# Patient Record
Sex: FEMALE | Race: WHITE | NOT HISPANIC OR LATINO | Employment: FULL TIME | URBAN - METROPOLITAN AREA
[De-identification: names, ages, dates, MRNs, and addresses within clinical notes are randomized per-mention and may not be internally consistent; named-entity substitution may affect disease eponyms.]

---

## 2017-01-01 ENCOUNTER — OFFICE VISIT (OUTPATIENT)
Dept: INTERNAL MEDICINE | Facility: CLINIC | Age: 57
End: 2017-01-01
Payer: COMMERCIAL

## 2017-01-01 ENCOUNTER — OFFICE VISIT (OUTPATIENT)
Dept: ORTHOPEDICS | Facility: CLINIC | Age: 57
End: 2017-01-01
Payer: COMMERCIAL

## 2017-01-01 ENCOUNTER — LAB VISIT (OUTPATIENT)
Dept: LAB | Facility: HOSPITAL | Age: 57
End: 2017-01-01
Attending: INTERNAL MEDICINE
Payer: COMMERCIAL

## 2017-01-01 VITALS
WEIGHT: 207.25 LBS | OXYGEN SATURATION: 95 % | HEIGHT: 65 IN | DIASTOLIC BLOOD PRESSURE: 70 MMHG | BODY MASS INDEX: 34.53 KG/M2 | HEART RATE: 78 BPM | SYSTOLIC BLOOD PRESSURE: 138 MMHG

## 2017-01-01 VITALS — WEIGHT: 206 LBS | BODY MASS INDEX: 34.32 KG/M2 | HEIGHT: 65 IN

## 2017-01-01 VITALS — BODY MASS INDEX: 34.36 KG/M2 | HEIGHT: 65 IN | WEIGHT: 206.25 LBS

## 2017-01-01 DIAGNOSIS — E11.8 TYPE 2 DIABETES MELLITUS WITH COMPLICATION: ICD-10-CM

## 2017-01-01 DIAGNOSIS — E11.65 UNCONTROLLED TYPE 2 DIABETES MELLITUS WITH HYPERGLYCEMIA, WITH LONG-TERM CURRENT USE OF INSULIN: ICD-10-CM

## 2017-01-01 DIAGNOSIS — M17.12 PRIMARY OSTEOARTHRITIS OF LEFT KNEE: Primary | ICD-10-CM

## 2017-01-01 DIAGNOSIS — Z79.4 UNCONTROLLED TYPE 2 DIABETES MELLITUS WITH HYPERGLYCEMIA, WITH LONG-TERM CURRENT USE OF INSULIN: Primary | ICD-10-CM

## 2017-01-01 DIAGNOSIS — E11.65 UNCONTROLLED TYPE 2 DIABETES MELLITUS WITH HYPERGLYCEMIA, WITH LONG-TERM CURRENT USE OF INSULIN: Primary | ICD-10-CM

## 2017-01-01 DIAGNOSIS — Z79.4 UNCONTROLLED TYPE 2 DIABETES MELLITUS WITH HYPERGLYCEMIA, WITH LONG-TERM CURRENT USE OF INSULIN: ICD-10-CM

## 2017-01-01 DIAGNOSIS — F17.200 TOBACCO DEPENDENCE: ICD-10-CM

## 2017-01-01 DIAGNOSIS — G35 MULTIPLE SCLEROSIS: ICD-10-CM

## 2017-01-01 DIAGNOSIS — M17.12 OSTEOARTHRITIS OF LEFT KNEE, UNSPECIFIED OSTEOARTHRITIS TYPE: Primary | ICD-10-CM

## 2017-01-01 DIAGNOSIS — M25.362 UNSTABLE KNEE, LEFT: ICD-10-CM

## 2017-01-01 DIAGNOSIS — I10 ESSENTIAL HYPERTENSION: ICD-10-CM

## 2017-01-01 LAB
ESTIMATED AVG GLUCOSE: 146 MG/DL
HBA1C MFR BLD HPLC: 6.7 %

## 2017-01-01 PROCEDURE — 99999 PR PBB SHADOW E&M-EST. PATIENT-LVL III: CPT | Mod: PBBFAC,,, | Performed by: PHYSICIAN ASSISTANT

## 2017-01-01 PROCEDURE — 20610 DRAIN/INJ JOINT/BURSA W/O US: CPT | Mod: LT,S$GLB,, | Performed by: PHYSICIAN ASSISTANT

## 2017-01-01 PROCEDURE — 99499 UNLISTED E&M SERVICE: CPT | Mod: S$GLB,,, | Performed by: PHYSICIAN ASSISTANT

## 2017-01-01 PROCEDURE — 99999 PR PBB SHADOW E&M-EST. PATIENT-LVL II: CPT | Mod: PBBFAC,,, | Performed by: ORTHOPAEDIC SURGERY

## 2017-01-01 PROCEDURE — 99213 OFFICE O/P EST LOW 20 MIN: CPT | Mod: S$GLB,,, | Performed by: INTERNAL MEDICINE

## 2017-01-01 PROCEDURE — 99999 PR PBB SHADOW E&M-EST. PATIENT-LVL III: CPT | Mod: PBBFAC,,, | Performed by: INTERNAL MEDICINE

## 2017-01-01 PROCEDURE — 83036 HEMOGLOBIN GLYCOSYLATED A1C: CPT

## 2017-01-01 PROCEDURE — 36415 COLL VENOUS BLD VENIPUNCTURE: CPT | Mod: PO

## 2017-01-01 PROCEDURE — 99213 OFFICE O/P EST LOW 20 MIN: CPT | Mod: S$GLB,,, | Performed by: ORTHOPAEDIC SURGERY

## 2017-01-01 RX ORDER — INSULIN ASPART 100 [IU]/ML
INJECTION, SOLUTION INTRAVENOUS; SUBCUTANEOUS
Qty: 18 ML | Refills: 3 | Status: SHIPPED | OUTPATIENT
Start: 2017-01-01 | End: 2018-01-01 | Stop reason: SDUPTHER

## 2017-01-01 RX ORDER — METFORMIN HYDROCHLORIDE 1000 MG/1
TABLET ORAL
Qty: 180 TABLET | Refills: 1 | Status: SHIPPED | OUTPATIENT
Start: 2017-01-01 | End: 2018-01-01 | Stop reason: SDUPTHER

## 2017-01-01 RX ORDER — METOPROLOL TARTRATE 50 MG/1
TABLET ORAL
Qty: 180 TABLET | Refills: 1 | Status: SHIPPED | OUTPATIENT
Start: 2017-01-01 | End: 2018-01-01 | Stop reason: SDUPTHER

## 2017-01-01 RX ORDER — HYALURONATE SODIUM 20 MG/2 ML
20 SYRINGE (ML) INTRAARTICULAR WEEKLY
Status: SHIPPED | OUTPATIENT
Start: 2017-01-01

## 2017-01-01 RX ADMIN — Medication 20 MG: at 07:12

## 2017-01-01 RX ADMIN — Medication 20 MG: at 08:12

## 2017-01-30 DIAGNOSIS — K64.8 INTERNAL HEMORRHOID: ICD-10-CM

## 2017-01-30 DIAGNOSIS — F33.41 MDD (MAJOR DEPRESSIVE DISORDER), RECURRENT, IN PARTIAL REMISSION: ICD-10-CM

## 2017-01-31 RX ORDER — VENLAFAXINE HYDROCHLORIDE 150 MG/1
CAPSULE, EXTENDED RELEASE ORAL
Qty: 30 CAPSULE | Refills: 0 | Status: SHIPPED | OUTPATIENT
Start: 2017-01-31 | End: 2017-03-04 | Stop reason: SDUPTHER

## 2017-02-06 ENCOUNTER — LAB VISIT (OUTPATIENT)
Dept: LAB | Facility: HOSPITAL | Age: 57
End: 2017-02-06
Attending: INTERNAL MEDICINE
Payer: COMMERCIAL

## 2017-02-06 DIAGNOSIS — Z79.4 UNCONTROLLED TYPE 2 DIABETES MELLITUS WITH HYPERGLYCEMIA, WITH LONG-TERM CURRENT USE OF INSULIN: ICD-10-CM

## 2017-02-06 DIAGNOSIS — E11.65 UNCONTROLLED TYPE 2 DIABETES MELLITUS WITH HYPERGLYCEMIA, WITH LONG-TERM CURRENT USE OF INSULIN: ICD-10-CM

## 2017-02-06 LAB
ESTIMATED AVG GLUCOSE: 143 MG/DL
HBA1C MFR BLD HPLC: 6.6 %

## 2017-02-06 PROCEDURE — 36415 COLL VENOUS BLD VENIPUNCTURE: CPT | Mod: PO

## 2017-02-06 PROCEDURE — 83036 HEMOGLOBIN GLYCOSYLATED A1C: CPT

## 2017-03-02 ENCOUNTER — OFFICE VISIT (OUTPATIENT)
Dept: NEUROLOGY | Facility: CLINIC | Age: 57
End: 2017-03-02
Payer: COMMERCIAL

## 2017-03-02 ENCOUNTER — LAB VISIT (OUTPATIENT)
Dept: LAB | Facility: HOSPITAL | Age: 57
End: 2017-03-02
Attending: PSYCHIATRY & NEUROLOGY
Payer: COMMERCIAL

## 2017-03-02 VITALS
WEIGHT: 202.81 LBS | HEART RATE: 69 BPM | SYSTOLIC BLOOD PRESSURE: 143 MMHG | DIASTOLIC BLOOD PRESSURE: 69 MMHG | BODY MASS INDEX: 33.79 KG/M2 | HEIGHT: 65 IN

## 2017-03-02 DIAGNOSIS — M25.562 PAIN IN BOTH KNEES, UNSPECIFIED CHRONICITY: ICD-10-CM

## 2017-03-02 DIAGNOSIS — R26.9 GAIT DISTURBANCE: ICD-10-CM

## 2017-03-02 DIAGNOSIS — Z79.899 ENCOUNTER FOR LONG-TERM (CURRENT) USE OF HIGH-RISK MEDICATION: ICD-10-CM

## 2017-03-02 DIAGNOSIS — Z71.89 COUNSELING REGARDING GOALS OF CARE: ICD-10-CM

## 2017-03-02 DIAGNOSIS — E55.9 VITAMIN D INSUFFICIENCY: ICD-10-CM

## 2017-03-02 DIAGNOSIS — G35 MULTIPLE SCLEROSIS: ICD-10-CM

## 2017-03-02 DIAGNOSIS — G35 MULTIPLE SCLEROSIS: Primary | ICD-10-CM

## 2017-03-02 DIAGNOSIS — R53.83 FATIGUE, UNSPECIFIED TYPE: ICD-10-CM

## 2017-03-02 DIAGNOSIS — M25.561 PAIN IN BOTH KNEES, UNSPECIFIED CHRONICITY: ICD-10-CM

## 2017-03-02 LAB
25(OH)D3+25(OH)D2 SERPL-MCNC: 59 NG/ML
ALBUMIN SERPL BCP-MCNC: 3.5 G/DL
ALP SERPL-CCNC: 72 U/L
ALT SERPL W/O P-5'-P-CCNC: 22 U/L
AST SERPL-CCNC: 15 U/L
BASOPHILS # BLD AUTO: 0.03 K/UL
BASOPHILS NFR BLD: 0.3 %
BILIRUB DIRECT SERPL-MCNC: 0.1 MG/DL
BILIRUB SERPL-MCNC: 0.1 MG/DL
DIFFERENTIAL METHOD: NORMAL
EOSINOPHIL # BLD AUTO: 0.2 K/UL
EOSINOPHIL NFR BLD: 1.8 %
ERYTHROCYTE [DISTWIDTH] IN BLOOD BY AUTOMATED COUNT: 12.3 %
HCT VFR BLD AUTO: 40.9 %
HGB BLD-MCNC: 13.4 G/DL
LYMPHOCYTES # BLD AUTO: 3.6 K/UL
LYMPHOCYTES NFR BLD: 30.5 %
MCH RBC QN AUTO: 30.5 PG
MCHC RBC AUTO-ENTMCNC: 32.8 %
MCV RBC AUTO: 93 FL
MONOCYTES # BLD AUTO: 0.8 K/UL
MONOCYTES NFR BLD: 6.8 %
NEUTROPHILS # BLD AUTO: 7.1 K/UL
NEUTROPHILS NFR BLD: 60.3 %
PLATELET # BLD AUTO: 279 K/UL
PMV BLD AUTO: 11.1 FL
PROT SERPL-MCNC: 8 G/DL
RBC # BLD AUTO: 4.39 M/UL
VIT B12 SERPL-MCNC: 443 PG/ML
WBC # BLD AUTO: 11.72 K/UL

## 2017-03-02 PROCEDURE — 99215 OFFICE O/P EST HI 40 MIN: CPT | Mod: S$GLB,,, | Performed by: PHYSICIAN ASSISTANT

## 2017-03-02 PROCEDURE — 3078F DIAST BP <80 MM HG: CPT | Mod: S$GLB,,, | Performed by: PHYSICIAN ASSISTANT

## 2017-03-02 PROCEDURE — 80076 HEPATIC FUNCTION PANEL: CPT

## 2017-03-02 PROCEDURE — 82607 VITAMIN B-12: CPT

## 2017-03-02 PROCEDURE — 3077F SYST BP >= 140 MM HG: CPT | Mod: S$GLB,,, | Performed by: PHYSICIAN ASSISTANT

## 2017-03-02 PROCEDURE — 1160F RVW MEDS BY RX/DR IN RCRD: CPT | Mod: S$GLB,,, | Performed by: PHYSICIAN ASSISTANT

## 2017-03-02 PROCEDURE — 36415 COLL VENOUS BLD VENIPUNCTURE: CPT

## 2017-03-02 PROCEDURE — 85025 COMPLETE CBC W/AUTO DIFF WBC: CPT

## 2017-03-02 PROCEDURE — 99999 PR PBB SHADOW E&M-EST. PATIENT-LVL IV: CPT | Mod: PBBFAC,,, | Performed by: PHYSICIAN ASSISTANT

## 2017-03-02 PROCEDURE — 82306 VITAMIN D 25 HYDROXY: CPT

## 2017-03-02 NOTE — PROGRESS NOTES
"Subjective:       Patient ID: Serenity Powell is a 57 y.o. female who presents today for a routine clinic visit for MS.    MS HPI:  · DMT: Avonex  · Side effects from DMT? No  · Taking vitamin D3 as recommended? Yes -  Dose: 5,000 IU daily  · Patient states that she developed some tingling/numbness in R hand at the time of her mother's , and continues to come and go now.  She has felt this in the past when she had her "small stroke"  · She is having a great deal of pain in both knees she feels related to arthritis.  ·     SOCIAL HISTORY  Social History   Substance Use Topics    Smoking status: Current Every Day Smoker     Packs/day: 0.75     Years: 30.00    Smokeless tobacco: Never Used    Alcohol use No     Living arrangements - the patient lives with her grandson.  Employment [employer]    MS ROS:  · Fatigue: Yes - seems more lately. She wonders if it's her B-12 level again-she's stopped injections  · Sleep Disturbance: No  · Bladder Dysfunction: No--not taking Ditropan(removed from list today)  · Bowel Dysfunction: No  · Spasticity: Yes - primarily at night and sometimes when sitting  · Visual Symptoms: No  · Cognitive: No  · Mood Disorder: Yes - stable with Effexor  · Gait Disturbance: Yes - gait disturbance, continues to wear L AFO--feels this is stable  · Falls: No  · Hand Dysfunction: No  · Pain: Yes - as above, bilateral knee pain. She has seen ortho in the past, but states it has been quite some time. She is open to seeing them again  · Sexual Dysfunction: Not Assessed  · Skin Breakdown: No  · Tremors: No  · Dysphagia:  No  · Dysarthria:  No  · Heat sensitivity:  Yes   · Any un-met adaptive needs? No  · Copay Assist? No--paying 150$/month  · Clinical Trial candidate? Yes - antibody study        Objective:        1. 25 foot timed walk:6.8s, 6.9s with L AFO previous visits  Timed 25 Foot Walk: 2016 3/2/2017   Did patient wear an AFO? Yes Yes   Was assistive device used? No No   Time for 25 " Foot Walk (seconds) 6.5 6.9   Time for 25 Foot Walk (seconds) 6.7 6.9     Neurologic Exam     Mental Status   Oriented to person, place, and time.   Follows 3 step commands.   Speech: speech is normal   Level of consciousness: alert  Normal comprehension.     Cranial Nerves     CN II   Visual acuity: (20/40 OD; 20/30 OS corrected with Snellen hand held chart at 6 ft)    CN III, IV, VI   Pupils are equal, round, and reactive to light.  Extraocular motions are normal.     CN V   Facial sensation intact.     CN VII   Facial expression full, symmetric.     CN VIII   Hearing: intact (finger rub)    CN IX, X   Palate: symmetric    CN XI   CN XI normal.     CN XII   Tongue deviation: none    Motor Exam   Muscle bulk: normal  Right arm tone: normal  Left arm tone: normal  Right leg tone: increased  Left leg tone: increased    Strength   Right deltoid: 5/5  Left deltoid: 5/5  Right triceps: 5/5  Left triceps: 4/5  Right wrist extension: 5/5  Left wrist extension: 5/5  Right interossei: 5/5  Left interossei: 5/5  Right iliopsoas: 5/5  Left iliopsoas: 0/5  Right hamstrin/5  Left hamstrin/5  Right anterior tibial: 5/5  Left anterior tibial: 0/5  Right peroneal: 5/5  Left peroneal: 1/5    Sensory Exam   Right arm light touch: long standing numbness 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  Left arm light touch: normal  Right leg light touch: normal  Left leg light touch: normal  Right arm vibration: decreased from fingers  Left arm vibration: decreased from fingers  Right leg vibration: decreased from toes  Left leg vibration: decreased from toes    Gait, Coordination, and Reflexes     Gait  Gait: circumduction (L knee hyperextension; L foot drop)    Coordination   Finger to nose coordination: abnormal  Tandem walking coordination: abnormal    Tremor   Resting tremor: absent  Action tremor: absent    Reflexes   Right brachioradialis: 2+  Left brachioradialis: 2+  Right biceps: 2+  Left biceps: 2+  Right triceps: 2+  Left triceps:  2+  Right patellar: 2+  Left patellar: 2+  Right achilles: 2+  Left achilles: 2+  Right plantar: normal  Left plantar: upgoing  Right ankle clonus: absent  Left ankle clonus: absent  Right pendular knee jerk: absent  Left pendular knee jerk: absent       Normal RSM         Labs:   Ordered today: CBC, LFT, Vit D, Vit B-12    Lab Results   Component Value Date    [id number] 57 03/02/2016    [id number] 44 02/26/2015    [id number] 46 02/27/2012     No results found for: JCVINDEX, JCVANTIBODY  No results found for: PE8YPQEP, ABSOLUTECD3, CU0TUFBT, ABSOLUTECD8, DF4JQFDS, ABSOLUTECD4, LABCD48    Diagnosis/Assessment/Plan:    1. Multiple Sclerosis  · Assessment: Patient appears clinically stable on Avonex.   · Imaging: Will defer for now--will consider MRI in the next 6-12 months  · Disease Modifying Therapies: Continue Avonex and high dose Vit D3. Will check Avonex safety labs today along with Vit D and B-12    2. MS Symptom Assessment / Management  · Fatigue: Will recheck B-12  · Spasticity: encouraged stretching  · Visual Symptoms: recommended annual vision exam  · Gait Disturbance: consider PT--will refer to ortho secondary to worsening bilateral knee pain  · Pain: I have referred her to orthopedics today per her request  · Copay Assist: highly recommended applying for co-pay assistance as she is paying 150$/month for Avonex    Over 50% of this 40 minute visit was spent in direct face to face counseling of the patient regarding her current symptoms and management of same.  Follow up in 6 months with Dr. Richardson  Patient agreed to POC today.    Attending, Dr. Richardson, was available during today's encounter. Any change to plan along with cosign to appear in the EMR.     Gretchen Geller PA-C  [facility]    There are no diagnoses linked to this encounter.

## 2017-03-02 NOTE — MR AVS SNAPSHOT
[facility]  [address]  [facility] [address]  Phone: [phone]                  Serenity DESAI Sherry   3/2/2017 7:45 AM   Office Visit    Description:  Female : 1960   Provider:  Gretchen Geller PA-C   Department:  [facility]           Reason for Visit     Neurologic Problem           Diagnoses this Visit        Comments    Multiple sclerosis    -  Primary     Gait disturbance         Pain in both knees, unspecified chronicity                To Do List           Future Appointments        Provider Department Dept Phone    3/2/2017 9:00 AM LAB, SAME DAY [facility] [phone]    2017 9:00 AM Era Richardson MD [facility] [phone]      Goals (5 Years of Data)              Today    16    Blood Pressure <= 140/90   143/69  143/69  143/69    Related Problems    Uncontrolled type 2 diabetes mellitus    Notes - Note created  2016  8:59 AM by Chase Nelson MD    It is important to consistently maintain a controlled blood pressure.        Follow-Up and Disposition     Return in about 6 months (around 2017).      [facility]     [facility] -  Assistance  Registered nurses in the [facility] provide clinical advisement, health education, appointment booking, and other advisory services.  Call for this free service at [phone].             Medications           Message regarding Medications     Verify the changes and/or additions to your medication regime listed below are the same as discussed with your clinician today.  If any of these changes or additions are incorrect, please notify your healthcare provider.        STOP taking these medications     oxybutynin (DITROPAN-XL) 5 MG TR24 Take 1 tablet (5 mg total) by mouth once daily.           Verify that the below list of medications is an accurate representation of the medications you are  "currently taking.  If none reported, the list may be blank. If incorrect, please contact your healthcare provider. Carry this list with you in case of emergency.           Current Medications     atorvastatin (LIPITOR) 40 MG tablet Take 1 tablet (40 mg total) by mouth every evening.    AVONEX 30 mcg/0.5 mL injection INJECT 1 SYRINGE INTRAMUSCULARLY EVERY WEEK    BD INSULIN PEN NEEDLE UF SHORT 31 gauge x 5/16" Ndle     BD LUER-LINO SYRINGE 3 mL 25 gauge x 1" Syrg 1 each by Misc.(Non-Drug; Combo Route) route every 30 days. Use with b-12 shot    BLOOD SUGAR DIAGNOSTIC (CONTOUR NEXT STRIPS MISC) 1 each by Misc.(Non-Drug; Combo Route) route 4 (four) times daily.     CALCIUM CARBONATE/VITAMIN D3 (VITAMIN D-3 ORAL) Take 5,000 Units by mouth once daily.     cyanocobalamin 1,000 mcg/mL injection Inject 1000mcg (1ml) IM daily for 1 week, once a week for 1 month, then once a month thereafter    hydrochlorothiazide (HYDRODIURIL) 25 MG tablet TAKE 1 TABLET (25 MG TOTAL) BY MOUTH ONCE DAILY.    insulin aspart (NOVOLOG FLEXPEN) 100 unit/mL InPn pen 2 units before meals + sliding scale    insulin glargine (LANTUS SOLOSTAR) 100 unit/mL (3 mL) InPn pen Inject 50 Units into the skin every evening.    lisinopril (PRINIVIL,ZESTRIL) 40 MG tablet Take 1 tablet (40 mg total) by mouth once daily.    metformin (GLUCOPHAGE) 1000 MG tablet TAKE 1 TABLET (1,000 MG TOTAL) BY MOUTH 2 (TWO) TIMES DAILY.    metoprolol tartrate (LOPRESSOR) 50 MG tablet TAKE 1 TABLET (50 MG TOTAL) BY MOUTH 2 (TWO) TIMES DAILY.    pen needle, diabetic (BD INSULIN PEN NEEDLE UF MINI) 31 gauge x 3/16" Ndle 1 Box by Misc.(Non-Drug; Combo Route) route 3 (three) times daily.    syringe with needle, safety (INTEGRA SYRINGE) 1 mL 25 x 1" Syrg 25g 1in needle with 1cc syringes to be used for B12 injections.    venlafaxine (EFFEXOR-XR) 150 MG Cp24 TAKE 1 CAPSULE (150 MG TOTAL) BY MOUTH EVERY MORNING.    lancing device with lancets (ACCU-CHEK MULTICLIX LANCET) Kit 1 Box by " Misc.(Non-Drug; Combo Route) route once daily.           Clinical Reference Information           Your Vitals Were     BP                   143/69           Blood Pressure          Most Recent Value    BP  (!)  143/69      Allergies as of 3/2/2017     No Known Allergies      Immunizations Administered on Date of Encounter - 3/2/2017     None      Orders Placed During Today's Visit      Normal Orders This Visit    Ambulatory Referral to Orthopedics     Future Labs/Procedures Expected by Expires    CBC auto differential  3/2/2017 5/1/2018    Vitamin B12  3/2/2017 5/1/2018    Hepatic function panel  As directed 3/2/2018    Vitamin D  As directed 3/2/2018      Smoking Cessation     If you would like to quit smoking:   You may be eligible for free services if you are a [place] resident and started smoking cigarettes before September 1, 1988.  Call the Smoking Cessation [employer] (SCT) toll free at ([phone] 276-9368 or ([phone] 940-8422.   Call 5-798-QUIT-NOW if you do not meet the above criteria.            Language Assistance Services     ATTENTION: Language assistance services are available, free of charge. Please call [phone].      ATENCIÓN: Si habla español, tiene a gonzalez disposición servicios gratuitos de asistencia lingüística. Llame al [phone].     CHÚ Ý: N?u b?n nói Ti?ng Vi?t, có các d?ch v? h? tr? ngôn ng? mi?n phí dành cho b?n. G?i s? [phone].         [facility] complies with applicable Federal civil rights laws and does not discriminate on the basis of race, color, national origin, age, disability, or sex.

## 2017-03-04 DIAGNOSIS — K64.8 INTERNAL HEMORRHOID: ICD-10-CM

## 2017-03-04 DIAGNOSIS — F33.41 MDD (MAJOR DEPRESSIVE DISORDER), RECURRENT, IN PARTIAL REMISSION: ICD-10-CM

## 2017-03-06 RX ORDER — VENLAFAXINE HYDROCHLORIDE 150 MG/1
CAPSULE, EXTENDED RELEASE ORAL
Qty: 30 CAPSULE | Refills: 0 | Status: SHIPPED | OUTPATIENT
Start: 2017-03-06 | End: 2017-03-07 | Stop reason: SDUPTHER

## 2017-03-07 DIAGNOSIS — F33.41 MDD (MAJOR DEPRESSIVE DISORDER), RECURRENT, IN PARTIAL REMISSION: ICD-10-CM

## 2017-03-07 DIAGNOSIS — K64.8 INTERNAL HEMORRHOID: ICD-10-CM

## 2017-03-07 RX ORDER — VENLAFAXINE HYDROCHLORIDE 150 MG/1
150 CAPSULE, EXTENDED RELEASE ORAL DAILY
Qty: 90 CAPSULE | Refills: 0 | Status: SHIPPED | OUTPATIENT
Start: 2017-03-07 | End: 2017-06-22 | Stop reason: SDUPTHER

## 2017-03-07 NOTE — TELEPHONE ENCOUNTER
Spoke with pt to schedule an appointment. Pt will be seen in clinic on 3/30/2017 at 8:40am. Pt voiced understanding.

## 2017-03-07 NOTE — TELEPHONE ENCOUNTER
----- Message from Chase Nelson MD sent at 3/6/2017  6:02 PM CST -----  Patient overdue for 3 month follow-up in clinic.  Contact the patient to schedule appointment.

## 2017-03-30 ENCOUNTER — OFFICE VISIT (OUTPATIENT)
Dept: INTERNAL MEDICINE | Facility: CLINIC | Age: 57
End: 2017-03-30
Payer: COMMERCIAL

## 2017-03-30 ENCOUNTER — LAB VISIT (OUTPATIENT)
Dept: LAB | Facility: HOSPITAL | Age: 57
End: 2017-03-30
Attending: INTERNAL MEDICINE
Payer: COMMERCIAL

## 2017-03-30 VITALS
BODY MASS INDEX: 33.86 KG/M2 | SYSTOLIC BLOOD PRESSURE: 136 MMHG | HEART RATE: 74 BPM | OXYGEN SATURATION: 96 % | HEIGHT: 65 IN | DIASTOLIC BLOOD PRESSURE: 80 MMHG | WEIGHT: 203.25 LBS

## 2017-03-30 DIAGNOSIS — E78.5 HYPERLIPIDEMIA ASSOCIATED WITH TYPE 2 DIABETES MELLITUS: ICD-10-CM

## 2017-03-30 DIAGNOSIS — E11.69 HYPERLIPIDEMIA ASSOCIATED WITH TYPE 2 DIABETES MELLITUS: ICD-10-CM

## 2017-03-30 DIAGNOSIS — Z79.4 UNCONTROLLED TYPE 2 DIABETES MELLITUS WITH HYPERGLYCEMIA, WITH LONG-TERM CURRENT USE OF INSULIN: ICD-10-CM

## 2017-03-30 DIAGNOSIS — E11.65 UNCONTROLLED TYPE 2 DIABETES MELLITUS WITH HYPERGLYCEMIA, WITH LONG-TERM CURRENT USE OF INSULIN: ICD-10-CM

## 2017-03-30 DIAGNOSIS — E11.65 CONTROLLED TYPE 2 DIABETES MELLITUS WITH HYPERGLYCEMIA, WITH LONG-TERM CURRENT USE OF INSULIN: ICD-10-CM

## 2017-03-30 DIAGNOSIS — F17.200 TOBACCO DEPENDENCE: Primary | ICD-10-CM

## 2017-03-30 DIAGNOSIS — Z79.4 CONTROLLED TYPE 2 DIABETES MELLITUS WITH HYPERGLYCEMIA, WITH LONG-TERM CURRENT USE OF INSULIN: ICD-10-CM

## 2017-03-30 DIAGNOSIS — R26.9 GAIT DISTURBANCE: ICD-10-CM

## 2017-03-30 LAB
HDLC SERPL-MCNC: 262 MG/DL
HDLC SERPL-MCNC: 35 MG/DL

## 2017-03-30 PROCEDURE — 82465 ASSAY BLD/SERUM CHOLESTEROL: CPT

## 2017-03-30 PROCEDURE — 99999 PR PBB SHADOW E&M-EST. PATIENT-LVL V: CPT | Mod: PBBFAC,,, | Performed by: INTERNAL MEDICINE

## 2017-03-30 PROCEDURE — 3079F DIAST BP 80-89 MM HG: CPT | Mod: S$GLB,,, | Performed by: INTERNAL MEDICINE

## 2017-03-30 PROCEDURE — 36415 COLL VENOUS BLD VENIPUNCTURE: CPT | Mod: PO

## 2017-03-30 PROCEDURE — 3045F PR MOST RECENT HEMOGLOBIN A1C LEVEL 7.0-9.0%: CPT | Mod: S$GLB,,, | Performed by: INTERNAL MEDICINE

## 2017-03-30 PROCEDURE — 93005 ELECTROCARDIOGRAM TRACING: CPT | Mod: S$GLB,,, | Performed by: INTERNAL MEDICINE

## 2017-03-30 PROCEDURE — 3075F SYST BP GE 130 - 139MM HG: CPT | Mod: S$GLB,,, | Performed by: INTERNAL MEDICINE

## 2017-03-30 PROCEDURE — 1160F RVW MEDS BY RX/DR IN RCRD: CPT | Mod: S$GLB,,, | Performed by: INTERNAL MEDICINE

## 2017-03-30 PROCEDURE — 83718 ASSAY OF LIPOPROTEIN: CPT

## 2017-03-30 PROCEDURE — 99214 OFFICE O/P EST MOD 30 MIN: CPT | Mod: S$GLB,,, | Performed by: INTERNAL MEDICINE

## 2017-03-30 PROCEDURE — 4010F ACE/ARB THERAPY RXD/TAKEN: CPT | Mod: S$GLB,,, | Performed by: INTERNAL MEDICINE

## 2017-03-30 PROCEDURE — 83701 LIPOPROTEIN BLD HR FRACTION: CPT

## 2017-03-30 PROCEDURE — 83036 HEMOGLOBIN GLYCOSYLATED A1C: CPT

## 2017-03-30 PROCEDURE — 93010 ELECTROCARDIOGRAM REPORT: CPT | Mod: S$GLB,,, | Performed by: INTERNAL MEDICINE

## 2017-03-30 PROCEDURE — 2022F DILAT RTA XM EVC RTNOPTHY: CPT | Mod: S$GLB,,, | Performed by: INTERNAL MEDICINE

## 2017-03-30 RX ORDER — NAPROXEN SODIUM 220 MG/1
81 TABLET, FILM COATED ORAL DAILY
Start: 2017-03-30

## 2017-03-30 RX ORDER — ATORVASTATIN CALCIUM 20 MG/1
20 TABLET, FILM COATED ORAL NIGHTLY
Qty: 90 TABLET | Refills: 2 | Status: SHIPPED | OUTPATIENT
Start: 2017-03-30 | End: 2017-09-21 | Stop reason: SDUPTHER

## 2017-03-30 NOTE — PROGRESS NOTES
Portions of this note are generated with voice recognition software. Typographical errors may exist.     SUBJECTIVE:    This is a/an 57 y.o. female here for primary care visit for  Chief Complaint   Patient presents with    Fall     Patient states she had a minor ground-level fall 2 weeks ago.  States she didn't talked any other medical providers about this.  States she didn't sustain any significant injuries.  States that it was related to obstacle in a walkway at work.    Tobacco dependence.  Patient has never tried nicotine replacement therapy.  States that she continues to smoke excessively.    Hyperlipidemia.  Patient denies angina symptoms.  States that she suspended her atorvastatin because of nausea induced by the medication.      Medications Reviewed and Updated    Past medical, family, and social histories were reviewed and updated.    Review of Systems negative unless noted otherwise in history of present illness-  General ROS: negative  Psychological ROS: negative  Endocrine ROS: negative  Cardiovascular ROS: negative  Musculoskeletal ROS: negative    Allergic:  Review of patient's allergies indicates:  No Known Allergies    OBJECTIVE:  BP: 136/80 Pulse: 74    Wt Readings from Last 3 Encounters:   03/30/17 92.2 kg (203 lb 4.2 oz)   03/02/17 92 kg (202 lb 12.8 oz)   12/08/16 88 kg (194 lb 0.1 oz)    Body mass index is 33.82 kg/(m^2).  Previous Blood Pressure Readings :   BP Readings from Last 3 Encounters:   03/30/17 136/80   03/02/17 (!) 143/69   12/08/16 136/84       GEN: No apparent distress  HEENT: sclera non-icteric, conjunctiva clear  CV: no peripheral edema  PULM: breathing non-labored  ABD: Obese, protuberant abdomen.  PSYCH: appropriate affect  MSK: able to rise from chair without assistance  SKIN: normal skin turgor    Pertinent Labs Reviewed       ASSESSMENT/PLAN:    Tobacco dependence  -     Ambulatory referral to Smoking Cessation Program    Hyperlipidemia associated with type 2 diabetes  mellitus  -     atorvastatin (LIPITOR) 20 MG tablet; Take 1 tablet (20 mg total) by mouth every evening.  Dispense: 90 tablet; Refill: 2  -     aspirin 81 MG Chew; Take 1 tablet (81 mg total) by mouth once daily.  -     Cholesterol, total; Future; Expected date: 3/30/17  -     HDL CHOLESTEROL; Future; Expected date: 3/30/17  -     LDL CHOLESTEROL, DIRECT; Future; Expected date: 3/30/17    Controlled type 2 diabetes mellitus with hyperglycemia, with long-term current use of insulin  -     Ambulatory Referral to Optometry    Gait disturbance          Future Appointments  Date Time Provider Department Center   4/3/2017 10:00 AM Kerwin Pond OD [facility] OPTOMTY [place]   4/12/2017 9:00 AM Josias Duff MD [facility] ORTHO [address]   4/12/2017 10:00 AM [facility] NEUROLOGY-[facility] [address] [address]   5/3/2017 8:40 AM Chase Nelson MD [facility]   9/5/2017 9:00 AM Era Richardson MD [facility] MSC [facility] [facility]  3/30/2017  9:06 AM

## 2017-03-30 NOTE — MR AVS SNAPSHOT
[facility] Internal Medicine   [address]  [place] [address]  Phone: [phone]  Fax: [phone]                  Serenity Powell   3/30/2017 8:40 AM   Office Visit    Description:  Female : 1960   Provider:  Chase Nelson MD   Department:  [facility] Internal Medicine           Reason for Visit     Fall           Diagnoses this Visit        Comments    Tobacco dependence    -  Primary     Hyperlipidemia associated with type 2 diabetes mellitus         Controlled type 2 diabetes mellitus with hyperglycemia, with long-term current use of insulin         Gait disturbance                To Do List           Future Appointments        Provider Department Dept Phone    3/30/2017 9:30 AM LAB, [facility] [phone]    4/3/2017 10:00 AM Kerwin Pond OD [place] - Optometry [phone]    2017 9:00 AM Josias Duff MD [facility] - Orthopedics [phone]    2017 10:00 AM RESEARCH, NEUROLOGY-OP [facility] [phone]    5/3/2017 8:40 AM Chase Nelson MD [facility] [phone]      Goals (5 Years of Data)              Today    3/2/17    12/8/16    Blood Pressure <= 140/90   136/80  136/80  136/80    Related Problems    Controlled type 2 diabetes mellitus with hyperglycemia, with long-term current use of insulin    Notes - Note created  2016  8:59 AM by Chase Nelson MD    It is important to consistently maintain a controlled blood pressure.         These Medications        Disp Refills Start End    atorvastatin (LIPITOR) 20 MG tablet 90 tablet 2 3/30/2017 3/30/2018    Take 1 tablet (20 mg total) by mouth every evening. - Oral    Pharmacy: [facility]/pharmacy #[id number] - [place] [address] [facility] Ph #: [phone]       aspirin 81 MG Chew   3/30/2017     Take 1 tablet (81 mg total) by mouth once daily. - Oral    Pharmacy: [facility]/pharmacy #[id number] - [place] - [address] [facility] #: [phone]         [facility]     [facility] - 24/7 Assistance  Unless otherwise directed by your provider, please contact [facility], our nurse care line that is available for 24/7 assistance.     Registered nurses in the [facility] provide: appointment scheduling, clinical advisement, health education, and other advisory services.  Call: [phone] (toll free)               Medications           Message regarding Medications     Verify the changes and/or additions to your medication regime listed below are the same as discussed with your clinician today.  If any of these changes or additions are incorrect, please notify your healthcare provider.        START taking these NEW medications        Refills    aspirin 81 MG Chew     Sig: Take 1 tablet (81 mg total) by mouth once daily.    Class: No Print    Route: Oral      CHANGE how you are taking these medications     Start Taking Instead of    atorvastatin (LIPITOR) 20 MG tablet atorvastatin (LIPITOR) 40 MG tablet    Dosage:  Take 1 tablet (20 mg total) by mouth every evening. Dosage:  Take 1 tablet (40 mg total) by mouth every evening.    Reason for Change:  Reorder            Verify that the below list of medications is an accurate representation of the medications you are currently taking.  If none reported, the list may be blank. If incorrect, please contact your healthcare provider. Carry this list with you in case of emergency.           Current Medications     atorvastatin (LIPITOR) 20 MG tablet Take 1 tablet (20 mg total) by mouth every evening.    AVONEX 30 mcg/0.5 mL injection INJECT 1 SYRINGE INTRAMUSCULARLY EVERY WEEK    BD INSULIN PEN NEEDLE UF SHORT 31 gauge x 5/16" Ndle     BD LUER-LINO SYRINGE 3 mL 25 gauge x 1" Syrg 1 each by Misc.(Non-Drug; Combo Route) route every 30 days. Use with b-12 shot    BLOOD SUGAR DIAGNOSTIC (CONTOUR NEXT STRIPS MISC) 1 " "each by Misc.(Non-Drug; Combo Route) route 4 (four) times daily.     CALCIUM CARBONATE/VITAMIN D3 (VITAMIN D-3 ORAL) Take 5,000 Units by mouth once daily.     cyanocobalamin 1,000 mcg/mL injection Inject 1000mcg (1ml) IM daily for 1 week, once a week for 1 month, then once a month thereafter    hydrochlorothiazide (HYDRODIURIL) 25 MG tablet TAKE 1 TABLET (25 MG TOTAL) BY MOUTH ONCE DAILY.    insulin aspart (NOVOLOG FLEXPEN) 100 unit/mL InPn pen 2 units before meals + sliding scale    insulin glargine (LANTUS SOLOSTAR) 100 unit/mL (3 mL) InPn pen Inject 50 Units into the skin every evening.    lancing device with lancets (ACCU-CHEK MULTICLIX LANCET) Kit 1 Box by Misc.(Non-Drug; Combo Route) route once daily.    lisinopril (PRINIVIL,ZESTRIL) 40 MG tablet Take 1 tablet (40 mg total) by mouth once daily.    metformin (GLUCOPHAGE) 1000 MG tablet TAKE 1 TABLET (1,000 MG TOTAL) BY MOUTH 2 (TWO) TIMES DAILY.    metoprolol tartrate (LOPRESSOR) 50 MG tablet TAKE 1 TABLET (50 MG TOTAL) BY MOUTH 2 (TWO) TIMES DAILY.    pen needle, diabetic (BD INSULIN PEN NEEDLE UF MINI) 31 gauge x 3/16" Ndle 1 Box by Misc.(Non-Drug; Combo Route) route 3 (three) times daily.    syringe with needle, safety (INTEGRA SYRINGE) 1 mL 25 x 1" Syrg 25g 1in needle with 1cc syringes to be used for B12 injections.    venlafaxine (EFFEXOR-XR) 150 MG Cp24 Take 1 capsule (150 mg total) by mouth once daily.    aspirin 81 MG Chew Take 1 tablet (81 mg total) by mouth once daily.           Clinical Reference Information           Your Vitals Were     BP Pulse Height Weight SpO2 BMI    136/80 (BP Location: Right arm, Patient Position: Sitting, BP Method: Manual) 74 5' 5" (1.651 m) 92.2 kg (203 lb 4.2 oz) 96% 33.82 kg/m2      Blood Pressure          Most Recent Value    BP  136/80      Allergies as of 3/30/2017     No Known Allergies      Immunizations Administered on Date of Encounter - 3/30/2017     None      Orders Placed During Today's Visit      Normal Orders " This Visit    Ambulatory Referral to Optometry     Ambulatory referral to Smoking Cessation Program     Future Labs/Procedures Expected by Expires    Cholesterol, total  3/30/2017 5/29/2018    HDL CHOLESTEROL  3/30/2017 5/29/2018    LDL CHOLESTEROL, DIRECT  3/30/2017 5/29/2018      Instructions    Recommendations for today    We strongly recommend that you resume a be aspirin.  81 mg once daily.    We strongly recommend that you start atorvastatin again.  This cholesterol medication can significantly reduce the risk of heart attack and stroke.  When restarting the medication mild side effects might develop.  Simply continue taking the medication and mild side effects tend to go away within 14 days.  If they do not please contact the clinic.    If significant side effects develop please stop the medication and contact the clinic.  Remember what we discussed about muscle aches associated rarely with cholesterol medication atorvastatin.  If in doubt contact the clinic about muscle related symptoms.    Never drink alcohol in excess with statin cholesterol medication.          What Is Angina?  Angina is a warning that your heart muscle is not getting enough oxygen-rich blood and is at risk for damage. Medicines, certain medical procedures, and lifestyle changes can help control angina. Talk with your healthcare provider about how to prevent angina and what to do if you get it.    How does angina feel?  Angina is often described as chest pain, but this can be misleading. Angina is not always painful, and it isnt always felt in the chest. Angina might feel like:  · Discomfort, aching, tightness, or pressure that comes and goes. You may feel this in your chest, back, abdomen, arm, shoulder, neck, or jaw.  · Tiredness that gets worse or you have more tiredness than usual for no clear reason  · Shortness of breath while doing something that used to be easy  · Heartburn, indigestion, nausea, or sweating  Call 911 right away  if any of your symptoms lasts for more than a few minutes. Or if they go away and come back. Or if they happen at rest and don't go away after taking nitroglycerin. Or if they continue to get worse. You could be having a heart attack (acute myocardial infarction).  When does angina happen?  · Angina usually happens during activity. It can also occur when youre upset or after a large meal. Sometimes angina can happen when the weather is too hot or too cold. All of these things can put more stress on your body and your heart.  · You may have unstable angina if angina starts occurring more often, lasts longer, happens even when you are resting, or causes more discomfort. Its a sign that your heart problem may be getting worse. You need to call your healthcare provider right away.  Date Last Reviewed: 10/1/2016  © 3171-7929 "[employer]. [address] [place] [address]. All rights reserved. This information is not intended as a substitute for professional medical care. Always follow your healthcare professional's instructions.             Smoking Cessation     If you would like to quit smoking:   You may be eligible for free services if you are a [place] resident and started smoking cigarettes before September 1, 1988.  Call the Smoking Cessation Trust ([facility]) toll free at (145) 194-8622 or (168) 925-7483.   Call 1-800-QUIT-NOW if you do not meet the above criteria.   Contact us via email: [email or web address]   View our website for more information: [email or web address]        Language Assistance Services     ATTENTION: Language assistance services are available, free of charge. Please call [phone].      ATENCIÓN: Si habla español, tiene a gonzalez disposición servicios gratuitos de asistencia lingüística. Llame al [phone].     CHÚ Ý: N?u b?n nói Ti?ng Vi?t, có các d?ch v? h? tr? ngôn ng? mi?n phí dành cho b?n. G?i s? [phone].         [place] - Internal Medicine  complies with applicable Federal civil rights laws and does not discriminate on the basis of race, color, national origin, age, disability, or sex.

## 2017-03-30 NOTE — PATIENT INSTRUCTIONS
Recommendations for today    We strongly recommend that you resume a be aspirin.  81 mg once daily.    We strongly recommend that you start atorvastatin again.  This cholesterol medication can significantly reduce the risk of heart attack and stroke.  When restarting the medication mild side effects might develop.  Simply continue taking the medication and mild side effects tend to go away within 14 days.  If they do not please contact the clinic.    If significant side effects develop please stop the medication and contact the clinic.  Remember what we discussed about muscle aches associated rarely with cholesterol medication atorvastatin.  If in doubt contact the clinic about muscle related symptoms.    Never drink alcohol in excess with statin cholesterol medication.          What Is Angina?  Angina is a warning that your heart muscle is not getting enough oxygen-rich blood and is at risk for damage. Medicines, certain medical procedures, and lifestyle changes can help control angina. Talk with your healthcare provider about how to prevent angina and what to do if you get it.    How does angina feel?  Angina is often described as chest pain, but this can be misleading. Angina is not always painful, and it isnt always felt in the chest. Angina might feel like:  · Discomfort, aching, tightness, or pressure that comes and goes. You may feel this in your chest, back, abdomen, arm, shoulder, neck, or jaw.  · Tiredness that gets worse or you have more tiredness than usual for no clear reason  · Shortness of breath while doing something that used to be easy  · Heartburn, indigestion, nausea, or sweating  Call 911 right away if any of your symptoms lasts for more than a few minutes. Or if they go away and come back. Or if they happen at rest and don't go away after taking nitroglycerin. Or if they continue to get worse. You could be having a heart attack (acute myocardial infarction).  When does angina happen?  · Angina  usually happens during activity. It can also occur when youre upset or after a large meal. Sometimes angina can happen when the weather is too hot or too cold. All of these things can put more stress on your body and your heart.  · You may have unstable angina if angina starts occurring more often, lasts longer, happens even when you are resting, or causes more discomfort. Its a sign that your heart problem may be getting worse. You need to call your healthcare provider right away.  Date Last Reviewed: 10/1/2016  © 2623-5027 [employer]. [address] [place] [address]. All rights reserved. This information is not intended as a substitute for professional medical care. Always follow your healthcare professional's instructions.

## 2017-03-31 LAB
ESTIMATED AVG GLUCOSE: 157 MG/DL
HBA1C MFR BLD HPLC: 7.1 %

## 2017-04-03 LAB — LDLC SERPL-MCNC: 202 MG/DL

## 2017-04-25 RX ORDER — INTERFERON BETA-1A 30MCG/.5ML
KIT INTRAMUSCULAR
Qty: 1 KIT | Refills: 5 | Status: SHIPPED | OUTPATIENT
Start: 2017-04-25 | End: 2017-10-12 | Stop reason: SDUPTHER

## 2017-05-03 ENCOUNTER — LAB VISIT (OUTPATIENT)
Dept: LAB | Facility: HOSPITAL | Age: 57
End: 2017-05-03
Attending: INTERNAL MEDICINE
Payer: COMMERCIAL

## 2017-05-03 ENCOUNTER — OFFICE VISIT (OUTPATIENT)
Dept: INTERNAL MEDICINE | Facility: CLINIC | Age: 57
End: 2017-05-03
Payer: COMMERCIAL

## 2017-05-03 VITALS
OXYGEN SATURATION: 96 % | HEIGHT: 65 IN | BODY MASS INDEX: 33.98 KG/M2 | DIASTOLIC BLOOD PRESSURE: 68 MMHG | HEART RATE: 78 BPM | SYSTOLIC BLOOD PRESSURE: 136 MMHG | WEIGHT: 203.94 LBS

## 2017-05-03 DIAGNOSIS — Z79.4 UNCONTROLLED TYPE 2 DIABETES MELLITUS WITH HYPERGLYCEMIA, WITH LONG-TERM CURRENT USE OF INSULIN: Primary | ICD-10-CM

## 2017-05-03 DIAGNOSIS — Z79.4 UNCONTROLLED TYPE 2 DIABETES MELLITUS WITH HYPERGLYCEMIA, WITH LONG-TERM CURRENT USE OF INSULIN: ICD-10-CM

## 2017-05-03 DIAGNOSIS — E11.65 UNCONTROLLED TYPE 2 DIABETES MELLITUS WITH HYPERGLYCEMIA, WITH LONG-TERM CURRENT USE OF INSULIN: ICD-10-CM

## 2017-05-03 DIAGNOSIS — E78.5 HYPERLIPIDEMIA ASSOCIATED WITH TYPE 2 DIABETES MELLITUS: ICD-10-CM

## 2017-05-03 DIAGNOSIS — E11.65 UNCONTROLLED TYPE 2 DIABETES MELLITUS WITH HYPERGLYCEMIA, WITH LONG-TERM CURRENT USE OF INSULIN: Primary | ICD-10-CM

## 2017-05-03 DIAGNOSIS — F17.200 TOBACCO DEPENDENCE: ICD-10-CM

## 2017-05-03 DIAGNOSIS — F43.21 ADJUSTMENT DISORDER WITH DEPRESSED MOOD: ICD-10-CM

## 2017-05-03 DIAGNOSIS — E11.69 HYPERLIPIDEMIA ASSOCIATED WITH TYPE 2 DIABETES MELLITUS: ICD-10-CM

## 2017-05-03 LAB
CHOLEST/HDLC SERPL: 5.9 {RATIO}
HDL/CHOLESTEROL RATIO: 16.9 %
HDLC SERPL-MCNC: 178 MG/DL
HDLC SERPL-MCNC: 30 MG/DL
LDLC SERPL CALC-MCNC: 112.4 MG/DL
NONHDLC SERPL-MCNC: 148 MG/DL
TRIGL SERPL-MCNC: 178 MG/DL

## 2017-05-03 PROCEDURE — 99999 PR PBB SHADOW E&M-EST. PATIENT-LVL IV: CPT | Mod: PBBFAC,,, | Performed by: INTERNAL MEDICINE

## 2017-05-03 PROCEDURE — 3045F PR MOST RECENT HEMOGLOBIN A1C LEVEL 7.0-9.0%: CPT | Mod: S$GLB,,, | Performed by: INTERNAL MEDICINE

## 2017-05-03 PROCEDURE — 4010F ACE/ARB THERAPY RXD/TAKEN: CPT | Mod: S$GLB,,, | Performed by: INTERNAL MEDICINE

## 2017-05-03 PROCEDURE — 99214 OFFICE O/P EST MOD 30 MIN: CPT | Mod: S$GLB,,, | Performed by: INTERNAL MEDICINE

## 2017-05-03 PROCEDURE — 3075F SYST BP GE 130 - 139MM HG: CPT | Mod: S$GLB,,, | Performed by: INTERNAL MEDICINE

## 2017-05-03 PROCEDURE — 36415 COLL VENOUS BLD VENIPUNCTURE: CPT | Mod: PO

## 2017-05-03 PROCEDURE — 3078F DIAST BP <80 MM HG: CPT | Mod: S$GLB,,, | Performed by: INTERNAL MEDICINE

## 2017-05-03 PROCEDURE — 1160F RVW MEDS BY RX/DR IN RCRD: CPT | Mod: S$GLB,,, | Performed by: INTERNAL MEDICINE

## 2017-05-03 PROCEDURE — 80061 LIPID PANEL: CPT

## 2017-05-03 RX ORDER — INSULIN ASPART 100 [IU]/ML
INJECTION, SOLUTION INTRAVENOUS; SUBCUTANEOUS
Qty: 18 ML | Refills: 3 | Status: SHIPPED | OUTPATIENT
Start: 2017-05-03 | End: 2017-08-09 | Stop reason: SDUPTHER

## 2017-05-03 NOTE — MR AVS SNAPSHOT
[facility] Internal Medicine   [place]  [place] [address]  Phone: [phone]  Fax: [phone]                  Serenity Powell   5/3/2017 8:40 AM   Office Visit    Description:  Female : 1960   Provider:  Chase Nelson MD   Department:  [facility]           Reason for Visit     Follow-up           Diagnoses this Visit        Comments    Uncontrolled type 2 diabetes mellitus with hyperglycemia, with long-term current use of insulin                To Do List           Future Appointments        Provider Department Dept Phone    5/3/2017 9:15 AM [facility] [phone]    2017 8:20 AM Chase Nelson MD [facility] [phone]    2017 9:00 AM Era Richardson MD [facility] [address]- Multiple Sclerosis [phone]      Goals (5 Years of Data)              Today    3/30/17    3/2/17    Blood Pressure <= 140/90   136/68  136/68  136/68    Related Problems    Controlled type 2 diabetes mellitus with hyperglycemia, with long-term current use of insulin    Notes - Note created  2016  8:59 AM by Chase Nelson MD    It is important to consistently maintain a controlled blood pressure.         These Medications        Disp Refills Start End    insulin aspart (NOVOLOG FLEXPEN) 100 unit/mL InPn pen 18 mL 3 5/3/2017     4 units before meals + sliding scale    Pharmacy: [facility]/pharmacy #[id number] - [place] - [address] AT [facility] Ph #: [phone]       Notes to Pharmacy: Sliding scale plus mealtime insulin equal about 20 units daily      [facility] On Call     [facility] -  Assistance  Unless otherwise directed by your provider, please contact [facility], our nurse care line that is available for  assistance.     Registered nurses in the [facility] provide: appointment scheduling, clinical advisement, health education, and other  "advisory services.  Call: [phone] (toll free)               Medications           Message regarding Medications     Verify the changes and/or additions to your medication regime listed below are the same as discussed with your clinician today.  If any of these changes or additions are incorrect, please notify your healthcare provider.        CHANGE how you are taking these medications     Start Taking Instead of    insulin aspart (NOVOLOG FLEXPEN) 100 unit/mL InPn pen insulin aspart (NOVOLOG FLEXPEN) 100 unit/mL InPn pen    Dosage:  4 units before meals + sliding scale Dosage:  2 units before meals + sliding scale    Reason for Change:  Reorder            Verify that the below list of medications is an accurate representation of the medications you are currently taking.  If none reported, the list may be blank. If incorrect, please contact your healthcare provider. Carry this list with you in case of emergency.           Current Medications     aspirin 81 MG Chew Take 1 tablet (81 mg total) by mouth once daily.    atorvastatin (LIPITOR) 20 MG tablet Take 1 tablet (20 mg total) by mouth every evening.    AVONEX 30 mcg/0.5 mL injection INJECT 1 SYRINGE INTRAMUSCULARLY EVERY WEEK    BD INSULIN PEN NEEDLE UF SHORT 31 gauge x 5/16" Ndle     BD LUER-LINO SYRINGE 3 mL 25 gauge x 1" Syrg 1 each by Misc.(Non-Drug; Combo Route) route every 30 days. Use with b-12 shot    BLOOD SUGAR DIAGNOSTIC (CONTOUR NEXT STRIPS MISC) 1 each by Misc.(Non-Drug; Combo Route) route 4 (four) times daily.     CALCIUM CARBONATE/VITAMIN D3 (VITAMIN D-3 ORAL) Take 5,000 Units by mouth once daily.     cyanocobalamin 1,000 mcg/mL injection Inject 1000mcg (1ml) IM daily for 1 week, once a week for 1 month, then once a month thereafter    hydrochlorothiazide (HYDRODIURIL) 25 MG tablet TAKE 1 TABLET (25 MG TOTAL) BY MOUTH ONCE DAILY.    insulin aspart (NOVOLOG FLEXPEN) 100 unit/mL InPn pen 4 units before meals + sliding scale    insulin glargine " "(LANTUS SOLOSTAR) 100 unit/mL (3 mL) InPn pen Inject 50 Units into the skin every evening.    lancing device with lancets (ACCU-CHEK MULTICLIX LANCET) Kit 1 Box by Misc.(Non-Drug; Combo Route) route once daily.    lisinopril (PRINIVIL,ZESTRIL) 40 MG tablet Take 1 tablet (40 mg total) by mouth once daily.    metformin (GLUCOPHAGE) 1000 MG tablet TAKE 1 TABLET (1,000 MG TOTAL) BY MOUTH 2 (TWO) TIMES DAILY.    metoprolol tartrate (LOPRESSOR) 50 MG tablet TAKE 1 TABLET (50 MG TOTAL) BY MOUTH 2 (TWO) TIMES DAILY.    pen needle, diabetic (BD INSULIN PEN NEEDLE UF MINI) 31 gauge x 3/16" Ndle 1 Box by Misc.(Non-Drug; Combo Route) route 3 (three) times daily.    syringe with needle, safety (INTEGRA SYRINGE) 1 mL 25 x 1" Syrg 25g 1in needle with 1cc syringes to be used for B12 injections.    venlafaxine (EFFEXOR-XR) 150 MG Cp24 Take 1 capsule (150 mg total) by mouth once daily.           Clinical Reference Information           Your Vitals Were     BP Pulse Height Weight SpO2 BMI    136/68 (BP Location: Right arm, Patient Position: Sitting, BP Method: Manual) 78 5' 5" (1.651 m) 92.5 kg (203 lb 14.8 oz) 96% 33.93 kg/m2      Blood Pressure          Most Recent Value    BP  136/68      Allergies as of 5/3/2017     No Known Allergies      Immunizations Administered on Date of Encounter - 5/3/2017     None      Orders Placed During Today's Visit     Future Labs/Procedures Expected by Expires    Lipid panel  5/3/2017 8/1/2017      Instructions    Recommendations for today     Consider the following recommendations regarding transitioning to  forms of nicotine.      Continue checking blood sugar before every meal.    Target blood sugar is between 90 and 120.    Make changes to mealtime insulin as indicated on this paperwork.    Contact the clinic if sugar numbers are consistently below 90 or above the range indicated above      Understanding E-Cigarettes    E-cigarettes have become a popular form of smoking. People may use " these devices in place of regular cigarettes. Or they may use them to try to quit smoking altogether.    What are e-cigarettes?  E-cigarettes are devices that allow users to breath in liquid that has nicotine in it. They are also known as electronic nicotine delivery systems. They may look like regular cigarettes, cigars, or pipes. Some are even made to look like everyday items, such as flashlights or pens.  How do you use an e-cigarette?  An e-cigarette has 3 parts. It has a battery, a heating device, and a cartridge or tank. The part that heats up is called an atomizer. To use it, you put in a cartridge or fill the tank with a liquid. This liquid contains nicotine. It may also have other chemicals and flavorings. When the e-cigarette is puffed, the atomizer heats up. It turns the liquid in the tank or cartridge into an aerosol. You then breathe in this vapor. The act is called vaping. It mimics real cigarette smoking.    Who is using e-cigarettes?  More and more people are puffing on e-cigarettes. Current and former smokers of tobacco are heavy users. So, too, are middle and high school students. In fact, e-cigarettes are now students preferred form of smoking. Marketing plays a large part. Flavors--such as coffee, mint, and cherry--may tempt young people to try these products. The same marketing strategies used to addict people to tobacco cigarettes are now being used with e-cigarettes.    Can e-cigarettes help smokers quit?  Proponents say that e-cigarettes may help smokers kick the habit. But more research is needed to find out how well they work as a stop-smoking aid--and how safe they are. Users are still exposed to nicotine. And some smokers use both normal cigarettes and e-cigarettes. They may choose an e-cigarette in places where other smoking products are banned.  Experts worry that a smoker who uses e-cigarettes may not try other, proven ways to quit. A number of quit-smoking tools are available that  "have been approved by the FDA. If you are trying to quit smoking, see your healthcare provider for help.    Are they safer than traditional cigarettes?  Little research has been done on e-cigarettes. Because of this, experts do not know how much nicotine or other possibly harmful chemicals users are inhaling. They also do not know the short- and long-term risks of vaping.    But e-cigarettes may seem safer than other forms of smoking. Users dont breathe in burning tobacco and its many toxins. These include tar, ammonia, and arsenic. But they may still be exposed to other harmful substances. The vapor may have heavy metals and chemicals like formaldehyde in it. Flavorings may also hide potentially hazardous chemicals and other toxins. For that reason, if you are willing to start nicotine patches, gum, lozenges or inhalers from the pharmacy, this would be a safer option because these nicotine products are regulated by the FDA and are considered "pure" or "clean" nicotine products without the risk of these extra chemical.s     At high doses, nicotine can cause dizziness and vomiting. Users who refill their own cartridges are at a greater risk for unsafe levels of the drug. Nicotine poisoning is a major concern in children. Children younger than 5 have been harmed after accidentally coming into contact with the nicotine liquid.    Are there any laws against using e-cigarettes?  The FDA recently ruled to regulate e-cigarettes. They are considered a tobacco product. Makers of these devices have to follow certain rules on safety and advertising. They also cant sell or market e-cigarettes to minors.      Date Last Reviewed: 4/6/2016  © 5626-3890 [employer]. [address] [place] [address], [place] [address]. All rights reserved. This information is not intended as a substitute for professional medical care. Always follow your healthcare professional's instructions.             Smoking Cessation     If you would like " to quit smoking:   You may be eligible for free services if you are a [place] resident and started smoking cigarettes before September 1, 1988.  Call the Smoking Cessation Trust (SCT) toll free at (351) 622-8439 or (901) 926-6432.   Call 1-800-QUIT-NOW if you do not meet the above criteria.   Contact us via email: [email or web address]   View our website for more information: [email or web address]        Language Assistance Services     ATTENTION: Language assistance services are available, free of charge. Please call [phone].      ATENCIÓN: Si habla español, tiene a gonzalez disposición servicios gratuitos de asistencia lingüística. Llame al [phone].     CHÚ Ý: N?u b?n nói Ti?ng Vi?t, có các d?ch v? h? tr? ngôn ng? mi?n phí dành cho b?n. G?i s? [phone].         [facility] Internal Medicine complies with applicable Federal civil rights laws and does not discriminate on the basis of race, color, national origin, age, disability, or sex.

## 2017-05-03 NOTE — PATIENT INSTRUCTIONS
Recommendations for today     Consider the following recommendations regarding transitioning to  forms of nicotine.      Continue checking blood sugar before every meal.    Target blood sugar is between 90 and 120.    Make changes to mealtime insulin as indicated on this paperwork.    Contact the clinic if sugar numbers are consistently below 90 or above the range indicated above      Understanding E-Cigarettes    E-cigarettes have become a popular form of smoking. People may use these devices in place of regular cigarettes. Or they may use them to try to quit smoking altogether.    What are e-cigarettes?  E-cigarettes are devices that allow users to breath in liquid that has nicotine in it. They are also known as electronic nicotine delivery systems. They may look like regular cigarettes, cigars, or pipes. Some are even made to look like everyday items, such as flashlights or pens.  How do you use an e-cigarette?  An e-cigarette has 3 parts. It has a battery, a heating device, and a cartridge or tank. The part that heats up is called an atomizer. To use it, you put in a cartridge or fill the tank with a liquid. This liquid contains nicotine. It may also have other chemicals and flavorings. When the e-cigarette is puffed, the atomizer heats up. It turns the liquid in the tank or cartridge into an aerosol. You then breathe in this vapor. The act is called vaping. It mimics real cigarette smoking.    Who is using e-cigarettes?  More and more people are puffing on e-cigarettes. Current and former smokers of tobacco are heavy users. So, too, are middle and high school students. In fact, e-cigarettes are now students preferred form of smoking. Marketing plays a large part. Flavors--such as coffee, mint, and cherry--may tempt young people to try these products. The same marketing strategies used to addict people to tobacco cigarettes are now being used with e-cigarettes.    Can e-cigarettes help smokers  "quit?  Proponents say that e-cigarettes may help smokers kick the habit. But more research is needed to find out how well they work as a stop-smoking aid--and how safe they are. Users are still exposed to nicotine. And some smokers use both normal cigarettes and e-cigarettes. They may choose an e-cigarette in places where other smoking products are banned.  Experts worry that a smoker who uses e-cigarettes may not try other, proven ways to quit. A number of quit-smoking tools are available that have been approved by the FDA. If you are trying to quit smoking, see your healthcare provider for help.    Are they safer than traditional cigarettes?  Little research has been done on e-cigarettes. Because of this, experts do not know how much nicotine or other possibly harmful chemicals users are inhaling. They also do not know the short- and long-term risks of vaping.    But e-cigarettes may seem safer than other forms of smoking. Users dont breathe in burning tobacco and its many toxins. These include tar, ammonia, and arsenic. But they may still be exposed to other harmful substances. The vapor may have heavy metals and chemicals like formaldehyde in it. Flavorings may also hide potentially hazardous chemicals and other toxins. For that reason, if you are willing to start nicotine patches, gum, lozenges or inhalers from the pharmacy, this would be a safer option because these nicotine products are regulated by the FDA and are considered "pure" or "clean" nicotine products without the risk of these extra chemical.s     At high doses, nicotine can cause dizziness and vomiting. Users who refill their own cartridges are at a greater risk for unsafe levels of the drug. Nicotine poisoning is a major concern in children. Children younger than 5 have been harmed after accidentally coming into contact with the nicotine liquid.    Are there any laws against using e-cigarettes?  The FDA recently ruled to regulate e-cigarettes. " They are considered a tobacco product. Makers of these devices have to follow certain rules on safety and advertising. They also cant sell or market e-cigarettes to minors.      Date Last Reviewed: 4/6/2016  © 8524-6778 [employer]. [address] [place] [address], [place] [address]. All rights reserved. This information is not intended as a substitute for professional medical care. Always follow your healthcare professional's instructions.

## 2017-05-04 NOTE — PROGRESS NOTES
Portions of this note are generated with voice recognition software. Typographical errors may exist.     SUBJECTIVE:    This is a/an 57 y.o. female here for primary care visit for  Chief Complaint   Patient presents with    Diabetes     1 month                Medications Reviewed and Updated    Past medical, family, and social histories were reviewed and updated.    Review of Systems negative unless noted otherwise in history of present illness-  General ROS: negative  Psychological ROS: negative  Hematological and Lymphatic ROS: negative  Endocrine ROS: negative  Respiratory ROS: negative  Cardiovascular ROS: negative  Gastrointestinal ROS: negative  Musculoskeletal ROS: negative    Allergic:  Review of patient's allergies indicates:  No Known Allergies    OBJECTIVE:  BP: 136/68 Pulse: 78    Wt Readings from Last 3 Encounters:   05/03/17 92.5 kg (203 lb 14.8 oz)   03/30/17 92.2 kg (203 lb 4.2 oz)   03/02/17 92 kg (202 lb 12.8 oz)    Body mass index is 33.93 kg/(m^2).  Previous Blood Pressure Readings :   BP Readings from Last 3 Encounters:   05/03/17 136/68   03/30/17 136/80   03/02/17 (!) 143/69       GEN: No apparent distress  HEENT: sclera non-icteric, conjunctiva clear  CV: no peripheral edema  PULM: breathing non-labored  ABD: Obese, protuberant abdomen.  PSYCH: appropriate affect  MSK: able to rise from chair without assistance  SKIN: normal skin turgor    Pertinent Labs Reviewed       ASSESSMENT/PLAN:    Uncontrolled type 2 diabetes mellitus with hyperglycemia, with long-term current use of insulin.  Not optimally controlled.  Adjust insulin as follows  -     Lipid panel; Future; Expected date: 5/3/17  -     insulin aspart (NOVOLOG FLEXPEN) 100 unit/mL InPn pen; 4 units before meals + sliding scale  Dispense: 18 mL; Refill: 3    Tobacco dependence.  Not optimally controlled.  Detailed counseling on nicotine replacement strategies and eventually quitting.    Adjustment disorder with depressed mood..  Condition stable.  Counseling on self-care measures.  Continue current medical therapy.    Hyperlipidemia associated with type 2 diabetes mellitus.. Recommend patient continue on the lower dose of statin.  Patient did not seem to tolerate higher intensity.      Future Appointments  Date Time Provider Department Center   6/6/2017 8:20 AM Chase Nelson MD [facility]   9/5/2017 9:00 AM Era Richardson MD [facility]  5/4/2017  11:57 AM

## 2017-06-08 DIAGNOSIS — I10 ESSENTIAL HYPERTENSION: ICD-10-CM

## 2017-06-08 RX ORDER — LISINOPRIL 40 MG/1
40 TABLET ORAL DAILY
Qty: 90 TABLET | Refills: 3 | Status: SHIPPED | OUTPATIENT
Start: 2017-06-08 | End: 2018-01-01 | Stop reason: SDUPTHER

## 2017-06-22 DIAGNOSIS — F33.41 MDD (MAJOR DEPRESSIVE DISORDER), RECURRENT, IN PARTIAL REMISSION: ICD-10-CM

## 2017-06-22 DIAGNOSIS — K64.8 INTERNAL HEMORRHOID: ICD-10-CM

## 2017-06-22 RX ORDER — VENLAFAXINE HYDROCHLORIDE 150 MG/1
150 CAPSULE, EXTENDED RELEASE ORAL DAILY
Qty: 90 CAPSULE | Refills: 0 | Status: SHIPPED | OUTPATIENT
Start: 2017-06-22 | End: 2017-09-21 | Stop reason: SDUPTHER

## 2017-07-29 DIAGNOSIS — E11.65 UNCONTROLLED TYPE 2 DIABETES MELLITUS WITH HYPERGLYCEMIA, WITH LONG-TERM CURRENT USE OF INSULIN: ICD-10-CM

## 2017-07-29 DIAGNOSIS — Z79.4 UNCONTROLLED TYPE 2 DIABETES MELLITUS WITH HYPERGLYCEMIA, WITH LONG-TERM CURRENT USE OF INSULIN: ICD-10-CM

## 2017-07-31 DIAGNOSIS — Z79.4 UNCONTROLLED TYPE 2 DIABETES MELLITUS WITH HYPERGLYCEMIA, WITH LONG-TERM CURRENT USE OF INSULIN: ICD-10-CM

## 2017-07-31 DIAGNOSIS — I10 ESSENTIAL HYPERTENSION: ICD-10-CM

## 2017-07-31 DIAGNOSIS — E11.65 UNCONTROLLED TYPE 2 DIABETES MELLITUS WITH HYPERGLYCEMIA, WITH LONG-TERM CURRENT USE OF INSULIN: ICD-10-CM

## 2017-07-31 RX ORDER — INSULIN GLARGINE 100 [IU]/ML
50 INJECTION, SOLUTION SUBCUTANEOUS NIGHTLY
Qty: 54 SYRINGE | Refills: 1 | Status: SHIPPED | OUTPATIENT
Start: 2017-07-31 | End: 2017-08-01 | Stop reason: SDUPTHER

## 2017-08-01 ENCOUNTER — PATIENT MESSAGE (OUTPATIENT)
Dept: INTERNAL MEDICINE | Facility: CLINIC | Age: 57
End: 2017-08-01

## 2017-08-01 DIAGNOSIS — Z79.4 CONTROLLED TYPE 2 DIABETES MELLITUS WITH HYPERGLYCEMIA, WITH LONG-TERM CURRENT USE OF INSULIN: Primary | ICD-10-CM

## 2017-08-01 DIAGNOSIS — E11.65 CONTROLLED TYPE 2 DIABETES MELLITUS WITH HYPERGLYCEMIA, WITH LONG-TERM CURRENT USE OF INSULIN: Primary | ICD-10-CM

## 2017-08-01 RX ORDER — HYDROCHLOROTHIAZIDE 25 MG/1
25 TABLET ORAL DAILY
Qty: 90 TABLET | Refills: 0 | Status: SHIPPED | OUTPATIENT
Start: 2017-08-01 | End: 2017-08-04 | Stop reason: SDUPTHER

## 2017-08-01 RX ORDER — INSULIN GLARGINE 100 [IU]/ML
50 INJECTION, SOLUTION SUBCUTANEOUS NIGHTLY
Qty: 18 SYRINGE | Refills: 1 | Status: SHIPPED | OUTPATIENT
Start: 2017-08-01 | End: 2017-08-09 | Stop reason: SDUPTHER

## 2017-08-04 DIAGNOSIS — I10 ESSENTIAL HYPERTENSION: ICD-10-CM

## 2017-08-04 RX ORDER — HYDROCHLOROTHIAZIDE 25 MG/1
TABLET ORAL
Qty: 90 TABLET | Refills: 0 | Status: SHIPPED | OUTPATIENT
Start: 2017-08-04 | End: 2018-01-01 | Stop reason: SDUPTHER

## 2017-08-04 NOTE — TELEPHONE ENCOUNTER
Spoke with patient about getting an appointment for lab prior to Dr Nelson visit on 08/09/17.  Patient will be here on Saturday 08/05/07 to do lab.  Patient verbalized understanding.

## 2017-08-04 NOTE — TELEPHONE ENCOUNTER
----- Message from Chase Nelson MD sent at 8/4/2017  1:10 PM CDT -----  Please contact patient to schedule blood work at least 2 days before appointment

## 2017-08-05 ENCOUNTER — LAB VISIT (OUTPATIENT)
Dept: LAB | Facility: HOSPITAL | Age: 57
End: 2017-08-05
Attending: INTERNAL MEDICINE
Payer: COMMERCIAL

## 2017-08-05 DIAGNOSIS — E11.65 CONTROLLED TYPE 2 DIABETES MELLITUS WITH HYPERGLYCEMIA, WITH LONG-TERM CURRENT USE OF INSULIN: ICD-10-CM

## 2017-08-05 DIAGNOSIS — Z79.4 CONTROLLED TYPE 2 DIABETES MELLITUS WITH HYPERGLYCEMIA, WITH LONG-TERM CURRENT USE OF INSULIN: ICD-10-CM

## 2017-08-05 LAB
ALBUMIN SERPL BCP-MCNC: 3.4 G/DL
ALP SERPL-CCNC: 65 U/L
ALT SERPL W/O P-5'-P-CCNC: 25 U/L
ANION GAP SERPL CALC-SCNC: 11 MMOL/L
AST SERPL-CCNC: 17 U/L
BILIRUB SERPL-MCNC: 0.4 MG/DL
BUN SERPL-MCNC: 17 MG/DL
CALCIUM SERPL-MCNC: 9.8 MG/DL
CHLORIDE SERPL-SCNC: 103 MMOL/L
CO2 SERPL-SCNC: 26 MMOL/L
CREAT SERPL-MCNC: 0.9 MG/DL
EST. GFR  (AFRICAN AMERICAN): >60 ML/MIN/1.73 M^2
EST. GFR  (NON AFRICAN AMERICAN): >60 ML/MIN/1.73 M^2
ESTIMATED AVG GLUCOSE: 177 MG/DL
GLUCOSE SERPL-MCNC: 143 MG/DL
HBA1C MFR BLD HPLC: 7.8 %
POTASSIUM SERPL-SCNC: 4.1 MMOL/L
PROT SERPL-MCNC: 7.4 G/DL
SODIUM SERPL-SCNC: 140 MMOL/L

## 2017-08-05 PROCEDURE — 83036 HEMOGLOBIN GLYCOSYLATED A1C: CPT

## 2017-08-05 PROCEDURE — 80053 COMPREHEN METABOLIC PANEL: CPT

## 2017-08-05 PROCEDURE — 36415 COLL VENOUS BLD VENIPUNCTURE: CPT | Mod: PO

## 2017-08-09 ENCOUNTER — OFFICE VISIT (OUTPATIENT)
Dept: INTERNAL MEDICINE | Facility: CLINIC | Age: 57
End: 2017-08-09
Payer: COMMERCIAL

## 2017-08-09 ENCOUNTER — PATIENT MESSAGE (OUTPATIENT)
Dept: INTERNAL MEDICINE | Facility: CLINIC | Age: 57
End: 2017-08-09

## 2017-08-09 VITALS
HEART RATE: 80 BPM | HEIGHT: 65 IN | SYSTOLIC BLOOD PRESSURE: 130 MMHG | DIASTOLIC BLOOD PRESSURE: 88 MMHG | BODY MASS INDEX: 34.34 KG/M2 | WEIGHT: 206.13 LBS

## 2017-08-09 DIAGNOSIS — F17.200 TOBACCO DEPENDENCE: ICD-10-CM

## 2017-08-09 DIAGNOSIS — E11.65 UNCONTROLLED TYPE 2 DIABETES MELLITUS WITH HYPERGLYCEMIA, WITH LONG-TERM CURRENT USE OF INSULIN: Primary | ICD-10-CM

## 2017-08-09 DIAGNOSIS — Z79.4 UNCONTROLLED TYPE 2 DIABETES MELLITUS WITH HYPERGLYCEMIA, WITH LONG-TERM CURRENT USE OF INSULIN: Primary | ICD-10-CM

## 2017-08-09 PROCEDURE — 99999 PR PBB SHADOW E&M-EST. PATIENT-LVL III: CPT | Mod: PBBFAC,,, | Performed by: INTERNAL MEDICINE

## 2017-08-09 PROCEDURE — 4010F ACE/ARB THERAPY RXD/TAKEN: CPT | Mod: S$GLB,,, | Performed by: INTERNAL MEDICINE

## 2017-08-09 PROCEDURE — 3075F SYST BP GE 130 - 139MM HG: CPT | Mod: S$GLB,,, | Performed by: INTERNAL MEDICINE

## 2017-08-09 PROCEDURE — 3045F PR MOST RECENT HEMOGLOBIN A1C LEVEL 7.0-9.0%: CPT | Mod: S$GLB,,, | Performed by: INTERNAL MEDICINE

## 2017-08-09 PROCEDURE — 99214 OFFICE O/P EST MOD 30 MIN: CPT | Mod: S$GLB,,, | Performed by: INTERNAL MEDICINE

## 2017-08-09 PROCEDURE — 3008F BODY MASS INDEX DOCD: CPT | Mod: S$GLB,,, | Performed by: INTERNAL MEDICINE

## 2017-08-09 PROCEDURE — 3079F DIAST BP 80-89 MM HG: CPT | Mod: S$GLB,,, | Performed by: INTERNAL MEDICINE

## 2017-08-09 RX ORDER — INSULIN ASPART 100 [IU]/ML
INJECTION, SOLUTION INTRAVENOUS; SUBCUTANEOUS
Qty: 18 ML | Refills: 3 | Status: SHIPPED | OUTPATIENT
Start: 2017-08-09 | End: 2017-09-20 | Stop reason: SDUPTHER

## 2017-08-09 RX ORDER — INSULIN GLARGINE 100 [IU]/ML
62 INJECTION, SOLUTION SUBCUTANEOUS NIGHTLY
Qty: 18 SYRINGE | Refills: 1 | Status: SHIPPED | OUTPATIENT
Start: 2017-08-09 | End: 2017-09-20 | Stop reason: SDUPTHER

## 2017-08-09 NOTE — PROGRESS NOTES
Portions of this note are generated with voice recognition software. Typographical errors may exist.     SUBJECTIVE:    This is a/an 57 y.o. female here for primary care visit for  Chief Complaint   Patient presents with    Diabetes     f/u      Patient denies any specific psychosocial barrier for compliant with blood sugar checking.  Patient simply stating that she is burned out.  States that she will redouble her efforts checking blood sugar.    Denies any low blood sugar episodes.    Patient has been confused about the concept of correction factor adding units of insulin above and beyond fundamental dose.  States that she has been taking rigid pre-meal dose 4 units.    Continues to smoke cigarettes.  Has contemplated nicotine replacement therapy but has been noncommittal.  On further discussion today she is willing to give this another thought.      Medications Reviewed and Updated    Past medical, family, and social histories were reviewed and updated.    Review of Systems negative unless otherwise noted in history of present illness-   General ROS: negative  Psychological ROS: negative  Cardiovascular ROS: negative  Gastrointestinal ROS: negative  Genito-Urinary ROS: negative  Musculoskeletal ROS: negative  Neurological ROS: negative      Allergic:  Review of patient's allergies indicates:  No Known Allergies    OBJECTIVE:  BP: 130/88 Pulse: 80    Wt Readings from Last 3 Encounters:   08/09/17 93.5 kg (206 lb 2.1 oz)   05/03/17 92.5 kg (203 lb 14.8 oz)   03/30/17 92.2 kg (203 lb 4.2 oz)    Body mass index is 34.3 kg/m².  Previous Blood Pressure Readings :   BP Readings from Last 3 Encounters:   08/09/17 130/88   05/03/17 136/68   03/30/17 136/80     GEN: No apparent distress  HEENT: sclera non-icteric, conjunctiva clear  CV: no peripheral edema  PULM: breathing non-labored  ABD: Obese, protuberant abdomen.  PSYCH: appropriate affect  MSK: able to rise from chair without assistance  SKIN: normal skin  enzo    Pertinent Labs Reviewed       ASSESSMENT/PLAN:    Uncontrolled type 2 diabetes mellitus with hyperglycemia, with long-term current use of insulin.Condition not optimally controlled. Detailed counseling on self care measures. Plan to monitor clinically in addition to plan below.   -     insulin aspart (NOVOLOG FLEXPEN) 100 unit/mL InPn pen; 4 units before meals + sliding scale  Dispense: 18 mL; Refill: 3  -     insulin glargine (LANTUS SOLOSTAR) 100 unit/mL (3 mL) InPn pen; Inject 62 Units into the skin every evening. Contact MD for any change +/- 4 units  Dispense: 18 Syringe; Refill: 1    Tobacco dependence..Condition not optimally controlled. Detailed counseling on self care measures. Plan to monitor clinically in addition to plan below.   -     Ambulatory referral to Smoking Cessation Program          Future Appointments  Date Time Provider Department Center   9/5/2017 9:00 AM Era Richardson MD [facility] [place]   9/15/2017 8:00 AM Chase Nelson MD [facility]       Chase Nelson  8/9/2017  10:56 AM

## 2017-08-09 NOTE — PATIENT INSTRUCTIONS
Recommendations for today    Continue checking blood sugars 3 times daily just before meals.  We highly suggest that you set an alarm so remind herself to do this.    Contact the clinic for any blood sugar less than 85.    Please start using the correction scale below to give yourself extra units of mealtime insulin.      Sliding Scale     LOW DOSE  < 70 mg/dL    TREAT LOW SUGAR AND CALL CLINIC  [phone]  70 - 130 mg/dL  0 units    131 - 180 mg/dL  2 units   181 -240 mg/dL  4 units    241 - 300 mg/dL  6 units    301- 350 mg/dL   8 units   351 - 400 mg/dL  10 units  > 400 mg/dL   Stat lab glucose and call physician with results.

## 2017-08-22 ENCOUNTER — CLINICAL SUPPORT (OUTPATIENT)
Dept: SMOKING CESSATION | Facility: CLINIC | Age: 57
End: 2017-08-22
Payer: COMMERCIAL

## 2017-08-22 DIAGNOSIS — F17.200 NICOTINE DEPENDENCE: Primary | ICD-10-CM

## 2017-08-22 PROCEDURE — 99999 PR PBB SHADOW E&M-EST. PATIENT-LVL I: CPT | Mod: PBBFAC,,,

## 2017-08-22 PROCEDURE — 99404 PREV MED CNSL INDIV APPRX 60: CPT | Mod: S$GLB,,,

## 2017-08-22 RX ORDER — VARENICLINE TARTRATE 0.5 (11)-1
KIT ORAL
Qty: 1 PACKAGE | Refills: 0 | Status: SHIPPED | OUTPATIENT
Start: 2017-08-22 | End: 2017-09-20

## 2017-08-22 RX ORDER — IBUPROFEN 200 MG
1 TABLET ORAL DAILY
Qty: 28 PATCH | Refills: 0 | Status: SHIPPED | OUTPATIENT
Start: 2017-08-22 | End: 2017-09-21

## 2017-08-22 NOTE — Clinical Note
Pt seen at intake today. She currently smokes 20 cigs/day. Discussed tobacco cessation medication of chantix starter pack and 21 mg nicotine patch QD. Pt started on rate reduction and wait time of 15 min prior to smoking. Will see pt back in group in 1 wk.

## 2017-08-24 DIAGNOSIS — E11.8 TYPE 2 DIABETES MELLITUS WITH COMPLICATION: ICD-10-CM

## 2017-08-24 RX ORDER — METFORMIN HYDROCHLORIDE 1000 MG/1
TABLET ORAL
Qty: 180 TABLET | Refills: 0 | Status: SHIPPED | OUTPATIENT
Start: 2017-08-24 | End: 2017-01-01 | Stop reason: SDUPTHER

## 2017-09-05 ENCOUNTER — OFFICE VISIT (OUTPATIENT)
Dept: NEUROLOGY | Facility: CLINIC | Age: 57
End: 2017-09-05
Payer: COMMERCIAL

## 2017-09-05 VITALS
WEIGHT: 209 LBS | HEIGHT: 65 IN | HEART RATE: 66 BPM | BODY MASS INDEX: 34.82 KG/M2 | DIASTOLIC BLOOD PRESSURE: 65 MMHG | SYSTOLIC BLOOD PRESSURE: 124 MMHG

## 2017-09-05 DIAGNOSIS — M21.862 HYPEREXTENSION DEFORMITY OF KNEE, LEFT: ICD-10-CM

## 2017-09-05 DIAGNOSIS — G89.29 CHRONIC PAIN OF LEFT KNEE: ICD-10-CM

## 2017-09-05 DIAGNOSIS — Z71.89 COUNSELING REGARDING GOALS OF CARE: ICD-10-CM

## 2017-09-05 DIAGNOSIS — M25.562 CHRONIC PAIN OF LEFT KNEE: ICD-10-CM

## 2017-09-05 DIAGNOSIS — R26.9 GAIT DISTURBANCE: ICD-10-CM

## 2017-09-05 DIAGNOSIS — G35 MS (MULTIPLE SCLEROSIS): Primary | ICD-10-CM

## 2017-09-05 PROCEDURE — 3008F BODY MASS INDEX DOCD: CPT | Mod: S$GLB,,, | Performed by: PSYCHIATRY & NEUROLOGY

## 2017-09-05 PROCEDURE — 3078F DIAST BP <80 MM HG: CPT | Mod: S$GLB,,, | Performed by: PSYCHIATRY & NEUROLOGY

## 2017-09-05 PROCEDURE — 3074F SYST BP LT 130 MM HG: CPT | Mod: S$GLB,,, | Performed by: PSYCHIATRY & NEUROLOGY

## 2017-09-05 PROCEDURE — 99999 PR PBB SHADOW E&M-EST. PATIENT-LVL III: CPT | Mod: PBBFAC,,, | Performed by: PSYCHIATRY & NEUROLOGY

## 2017-09-05 PROCEDURE — 99215 OFFICE O/P EST HI 40 MIN: CPT | Mod: S$GLB,,, | Performed by: PSYCHIATRY & NEUROLOGY

## 2017-09-05 NOTE — PROGRESS NOTES
"Subjective:       Patient ID: Serenity Powell is a 57 y.o. female who presents today for a routine clinic visit for MS.    MS HPI:  · DMT: Avonex  · Side effects from DMT? No  · Taking vitamin D3 as recommended? Yes -  Dose: 5,000 IU daily  · Overall she feels stable  · Wears left AFO  · Continues to have knee pain left > right;  She cancelled the appt we made for her with ortho;   · She has not stopped smoking yet;  Does have prescription for Chantix;     SOCIAL HISTORY  Social History   Substance Use Topics    Smoking status: Current Every Day Smoker     Packs/day: 0.75     Years: 30.00    Smokeless tobacco: Never Used    Alcohol use No     Living arrangements - the patient's grandson lives with her.  Employment  [employer]    MS ROS:  · Fatigue: Yes - "Occasionally", she says;   · Sleep Disturbance: No  · Bladder Dysfunction: No  · Bowel Dysfunction: No  · Spasticity: Yes - mild; stretches;   · Visual Symptoms: No  · Cognitive: No  · Mood Disorder: Yes - comes and goes;   · Gait Disturbance: Yes - wears left AFO; stable;   · Falls: Yes - once since here last; in her office; she tripped on an uneven floor;   · Hand Dysfunction: No  · Pain: No  · Sexual Dysfunction: Not Assessed  · Skin Breakdown: No  · Tremors: No  · Dysphagia:  No  · Dysarthria:  No  · Heat sensitivity:  Yes - mild  · Any un-met adaptive needs? No  · Copay Assist?  Yes -  $0       Objective:        25 foot timed walk: 7.0 seconds with left AFO; full left circumduction; hyperextends left knee  Timed 25 Foot Walk: 9/2/2016 3/2/2017   Did patient wear an AFO? Yes Yes   Was assistive device used? No No   Time for 25 Foot Walk (seconds) 6.5 6.9   Time for 25 Foot Walk (seconds) 6.7 6.9     Neurologic Exam          Labs:     Lab Results   Component Value Date    BSZHLPRP33TI 59 03/02/2017    DTBVVTET56HA 57 03/02/2016    [id number] 44 02/26/2015     CMP  Sodium   Date Value Ref Range Status   08/05/2017 140 136 - 145 mmol/L Final "     Potassium   Date Value Ref Range Status   08/05/2017 4.1 3.5 - 5.1 mmol/L Final     Chloride   Date Value Ref Range Status   08/05/2017 103 95 - 110 mmol/L Final     CO2   Date Value Ref Range Status   08/05/2017 26 23 - 29 mmol/L Final     Glucose   Date Value Ref Range Status   08/05/2017 143 (H) 70 - 110 mg/dL Final     BUN, Bld   Date Value Ref Range Status   08/05/2017 17 6 - 20 mg/dL Final     Creatinine   Date Value Ref Range Status   08/05/2017 0.9 0.5 - 1.4 mg/dL Final     Calcium   Date Value Ref Range Status   08/05/2017 9.8 8.7 - 10.5 mg/dL Final     Total Protein   Date Value Ref Range Status   08/05/2017 7.4 6.0 - 8.4 g/dL Final     Albumin   Date Value Ref Range Status   08/05/2017 3.4 (L) 3.5 - 5.2 g/dL Final     Total Bilirubin   Date Value Ref Range Status   08/05/2017 0.4 0.1 - 1.0 mg/dL Final     Comment:     For infants and newborns, interpretation of results should be based  on gestational age, weight and in agreement with clinical  observations.  Premature Infant recommended reference ranges:  Up to 24 hours.............<8.0 mg/dL  Up to 48 hours............<12.0 mg/dL  3-5 days..................<15.0 mg/dL  6-29 days.................<15.0 mg/dL       Alkaline Phosphatase   Date Value Ref Range Status   08/05/2017 65 55 - 135 U/L Final     AST   Date Value Ref Range Status   08/05/2017 17 10 - 40 U/L Final     ALT   Date Value Ref Range Status   08/05/2017 25 10 - 44 U/L Final     Anion Gap   Date Value Ref Range Status   08/05/2017 11 8 - 16 mmol/L Final     eGFR if    Date Value Ref Range Status   08/05/2017 >60.0 >60 mL/min/1.73 m^2 Final     eGFR if non    Date Value Ref Range Status   08/05/2017 >60.0 >60 mL/min/1.73 m^2 Final     Comment:     Calculation used to obtain the estimated glomerular filtration  rate (eGFR) is the CKD-EPI equation. Since race is unknown   in our information system, the eGFR values for   -American and  Non--American patients are given   for each creatinine result.         Diagnosis/Assessment/Plan:    1. Multiple Sclerosis  · Assessment: pt is clinically stable on Avonex  · Imaging: consider in 2018  · Disease Modifying Therapies: continue Avonex; continue high dose D3;     2. MS Symptom Assessment / Management  · Gait Disturbance: PT [facility]  · No other changes to regimen described in ROS above    3. Chronic pain and hyperextension L knee--refer to ortho    Follow up with Gretchen Geller PA-C in 6 months  Over 50% of this 40 minute visit was spent in direct face to face counseling of the patient about her MS and the management of her various symptoms    F/u 6 mo with Gretchen Guzman PA-C        MS (multiple sclerosis)  -     Ambulatory Referral to Physical/Occupational Therapy  -     Ambulatory Referral to Orthopedics    Gait disturbance  -     Ambulatory Referral to Physical/Occupational Therapy  -     Ambulatory Referral to Orthopedics    Chronic pain of left knee  -     Ambulatory Referral to Orthopedics    Hyperextension deformity of knee, left  -     Ambulatory Referral to Orthopedics

## 2017-09-07 ENCOUNTER — TELEPHONE (OUTPATIENT)
Dept: SMOKING CESSATION | Facility: CLINIC | Age: 57
End: 2017-09-07

## 2017-09-07 NOTE — TELEPHONE ENCOUNTER
Called patient to check up on her progress with tobacco cessation. She has either cancelled or no showed for group sessions so far. She states she has not started prescribed tobacco cessation medications. She states she remains uncertain if she wants to quit. Will try again later.

## 2017-09-14 ENCOUNTER — TELEPHONE (OUTPATIENT)
Dept: ORTHOPEDICS | Facility: CLINIC | Age: 57
End: 2017-09-14

## 2017-09-14 DIAGNOSIS — M25.562 LEFT KNEE PAIN, UNSPECIFIED CHRONICITY: Primary | ICD-10-CM

## 2017-09-16 DIAGNOSIS — F17.200 NICOTINE DEPENDENCE: ICD-10-CM

## 2017-09-20 ENCOUNTER — PATIENT MESSAGE (OUTPATIENT)
Dept: FAMILY MEDICINE | Facility: CLINIC | Age: 57
End: 2017-09-20

## 2017-09-20 ENCOUNTER — PATIENT MESSAGE (OUTPATIENT)
Dept: INTERNAL MEDICINE | Facility: CLINIC | Age: 57
End: 2017-09-20

## 2017-09-20 ENCOUNTER — OFFICE VISIT (OUTPATIENT)
Dept: INTERNAL MEDICINE | Facility: CLINIC | Age: 57
End: 2017-09-20
Payer: COMMERCIAL

## 2017-09-20 VITALS
HEART RATE: 77 BPM | SYSTOLIC BLOOD PRESSURE: 138 MMHG | DIASTOLIC BLOOD PRESSURE: 78 MMHG | BODY MASS INDEX: 34.56 KG/M2 | HEIGHT: 65 IN | WEIGHT: 207.44 LBS | OXYGEN SATURATION: 95 %

## 2017-09-20 DIAGNOSIS — F17.200 TOBACCO DEPENDENCE: ICD-10-CM

## 2017-09-20 DIAGNOSIS — Z79.4 UNCONTROLLED TYPE 2 DIABETES MELLITUS WITH HYPERGLYCEMIA, WITH LONG-TERM CURRENT USE OF INSULIN: Primary | ICD-10-CM

## 2017-09-20 DIAGNOSIS — E11.65 UNCONTROLLED TYPE 2 DIABETES MELLITUS WITH HYPERGLYCEMIA, WITH LONG-TERM CURRENT USE OF INSULIN: Primary | ICD-10-CM

## 2017-09-20 PROCEDURE — 3075F SYST BP GE 130 - 139MM HG: CPT | Mod: S$GLB,,, | Performed by: INTERNAL MEDICINE

## 2017-09-20 PROCEDURE — 99214 OFFICE O/P EST MOD 30 MIN: CPT | Mod: S$GLB,,, | Performed by: INTERNAL MEDICINE

## 2017-09-20 PROCEDURE — 3008F BODY MASS INDEX DOCD: CPT | Mod: S$GLB,,, | Performed by: INTERNAL MEDICINE

## 2017-09-20 PROCEDURE — 3078F DIAST BP <80 MM HG: CPT | Mod: S$GLB,,, | Performed by: INTERNAL MEDICINE

## 2017-09-20 PROCEDURE — 99999 PR PBB SHADOW E&M-EST. PATIENT-LVL III: CPT | Mod: PBBFAC,,, | Performed by: INTERNAL MEDICINE

## 2017-09-20 RX ORDER — INSULIN ASPART 100 [IU]/ML
INJECTION, SOLUTION INTRAVENOUS; SUBCUTANEOUS
Qty: 18 ML | Refills: 3 | Status: SHIPPED | OUTPATIENT
Start: 2017-09-20 | End: 2017-01-01 | Stop reason: SDUPTHER

## 2017-09-20 RX ORDER — INSULIN GLARGINE 100 [IU]/ML
40 INJECTION, SOLUTION SUBCUTANEOUS NIGHTLY
Qty: 18 SYRINGE | Refills: 1 | Status: SHIPPED | OUTPATIENT
Start: 2017-09-20 | End: 2018-01-01 | Stop reason: SDUPTHER

## 2017-09-20 NOTE — PROGRESS NOTES
Portions of this note are generated with voice recognition software. Typographical errors may exist.     SUBJECTIVE:    This is a/an 57 y.o. female here for primary care visit for  Chief Complaint   Patient presents with    Diabetes     1 month follow up     Patient is complying with insulin.  Seems to have occasional hypoglycemia after lunch.  Overall her lunch can be small depending on rush.  She is ambivalent about pursuing smoking cessation.  Has nicotine patches but has not tried them.  She is of most concern with Chantix and perceiving side effects relating to this medicine.        Medications Reviewed and Updated    Past medical, family, and social histories were reviewed and updated.    Review of Systems negative unless otherwise noted in history of present illness-  ROS    General ROS: negative  Psychological ROS: negative  Allergy and Immunology ROS: negative  Cardiovascular ROS: negative  Gastrointestinal ROS: negative  Genito-Urinary ROS: negative  Musculoskeletal ROS: negative          Allergic:  Review of patient's allergies indicates:  No Known Allergies    OBJECTIVE:  BP: 138/78 Pulse: 77    Wt Readings from Last 3 Encounters:   09/20/17 94.1 kg (207 lb 7.3 oz)   09/05/17 94.8 kg (209 lb)   08/09/17 93.5 kg (206 lb 2.1 oz)    Body mass index is 34.52 kg/m².  Previous Blood Pressure Readings :   BP Readings from Last 3 Encounters:   09/20/17 138/78   09/05/17 124/65   08/09/17 130/88       Physical Exam    GEN: No apparent distress  HEENT: sclera non-icteric, conjunctiva clear  CV: no peripheral edema  PULM: breathing non-labored  ABD: Obese, protuberant abdomen.  PSYCH: appropriate affect  MSK: able to rise from chair without assistance  SKIN: normal skin turgor    Pertinent Labs Reviewed       ASSESSMENT/PLAN:    Uncontrolled type 2 diabetes mellitus with hyperglycemia, with long-term current use of insulin.Condition not optimally controlled. Detailed counseling on self care measures. Plan to  monitor clinically in addition to plan below.   -     insulin glargine (LANTUS SOLOSTAR) 100 unit/mL (3 mL) InPn pen; Inject 40 Units into the skin every evening. Contact MD for any change +/- 4 units  Dispense: 18 Syringe; Refill: 1  -     insulin aspart (NOVOLOG FLEXPEN) 100 unit/mL InPn pen; 8 units breakfast / 4 units lunch / 8 units dinner  + sliding scale  Dispense: 18 mL; Refill: 3    Tobacco dependence.Condition not optimally controlled. Detailed counseling on self care measures. Plan to monitor clinically in addition to plan below.     Future Appointments  Date Time Provider Department Center   9/20/2017 5:30 PM Jeff DAVIS [facility] [place]   9/27/2017 8:00 AM [facility] [id number] 485 LB LIMIT [facility] XRAYORT [facility]   9/27/2017 8:15 AM Josias Duff MD [facility] ORTHO [facility]   9/27/2017 5:30 PM Jeff Yoder [facility] S[facility]KE [place]   10/4/2017 5:30 PM Jeff Yoder [facility] S[facility]KE [place]   11/20/2017 3:20 PM Chase Nelson MD [facility]       Chase Nelson  9/20/2017  5:11 PM

## 2017-09-20 NOTE — PATIENT INSTRUCTIONS
Recommendations for today    Reduce Lantus.  Start taking 40 units starting tonight and then check my [facility] for recommendations on changes you should make to mealtime insulin starting tomorrow.  I will send us message later today.    To avoid low sugar do not take full dosage of mealtime insulin if you are snacking on something 300 christi or less.  Instead give snack time insulin dosage of 2 units.    Mealtime insulin basic dose should be   8 units breakfast  4 units with lunch  8 units with dinner    In addition to correction scale indicated below depending on pre-meal sugar number.    Sliding Scale       LOW DOSE  < 70 mg/dL    TREAT LOW SUGAR AND CALL CLINIC  [phone]  70 - 130 mg/dL  0 units    131 - 180 mg/dL  2 units   181 -240 mg/dL  4 units    241 - 300 mg/dL  6 units    301- 350 mg/dL   8 units   351 - 400 mg/dL  10 units  > 400 mg/dL   Stat lab glucose and call physician with results.

## 2017-09-21 DIAGNOSIS — F33.41 MDD (MAJOR DEPRESSIVE DISORDER), RECURRENT, IN PARTIAL REMISSION: ICD-10-CM

## 2017-09-21 DIAGNOSIS — E78.5 HYPERLIPIDEMIA ASSOCIATED WITH TYPE 2 DIABETES MELLITUS: ICD-10-CM

## 2017-09-21 DIAGNOSIS — E11.69 HYPERLIPIDEMIA ASSOCIATED WITH TYPE 2 DIABETES MELLITUS: ICD-10-CM

## 2017-09-21 DIAGNOSIS — K64.8 INTERNAL HEMORRHOID: ICD-10-CM

## 2017-09-22 RX ORDER — ATORVASTATIN CALCIUM 20 MG/1
20 TABLET, FILM COATED ORAL NIGHTLY
Qty: 90 TABLET | Refills: 1 | Status: SHIPPED | OUTPATIENT
Start: 2017-09-22 | End: 2018-01-01 | Stop reason: SDUPTHER

## 2017-09-22 RX ORDER — VENLAFAXINE HYDROCHLORIDE 150 MG/1
150 CAPSULE, EXTENDED RELEASE ORAL DAILY
Qty: 90 CAPSULE | Refills: 1 | Status: SHIPPED | OUTPATIENT
Start: 2017-09-22 | End: 2018-01-01 | Stop reason: SDUPTHER

## 2017-09-23 DIAGNOSIS — F33.41 MDD (MAJOR DEPRESSIVE DISORDER), RECURRENT, IN PARTIAL REMISSION: ICD-10-CM

## 2017-09-23 DIAGNOSIS — K64.8 INTERNAL HEMORRHOID: ICD-10-CM

## 2017-09-25 RX ORDER — VENLAFAXINE HYDROCHLORIDE 150 MG/1
150 CAPSULE, EXTENDED RELEASE ORAL DAILY
Qty: 90 CAPSULE | Refills: 0 | Status: SHIPPED | OUTPATIENT
Start: 2017-09-25 | End: 2018-01-01 | Stop reason: SDUPTHER

## 2017-09-27 ENCOUNTER — OFFICE VISIT (OUTPATIENT)
Dept: ORTHOPEDICS | Facility: CLINIC | Age: 57
End: 2017-09-27
Payer: COMMERCIAL

## 2017-09-27 ENCOUNTER — HOSPITAL ENCOUNTER (OUTPATIENT)
Dept: RADIOLOGY | Facility: HOSPITAL | Age: 57
Discharge: HOME | End: 2017-09-27
Attending: ORTHOPAEDIC SURGERY
Payer: COMMERCIAL

## 2017-09-27 VITALS — HEIGHT: 65 IN | BODY MASS INDEX: 34.75 KG/M2 | WEIGHT: 208.56 LBS

## 2017-09-27 DIAGNOSIS — G35 MULTIPLE SCLEROSIS: ICD-10-CM

## 2017-09-27 DIAGNOSIS — M25.362 UNSTABLE KNEE, LEFT: ICD-10-CM

## 2017-09-27 DIAGNOSIS — M17.12 OSTEOARTHRITIS OF LEFT KNEE, UNSPECIFIED OSTEOARTHRITIS TYPE: Primary | ICD-10-CM

## 2017-09-27 DIAGNOSIS — M25.562 LEFT KNEE PAIN, UNSPECIFIED CHRONICITY: ICD-10-CM

## 2017-09-27 PROCEDURE — 99999 PR PBB SHADOW E&M-EST. PATIENT-LVL III: CPT | Mod: PBBFAC,,, | Performed by: ORTHOPAEDIC SURGERY

## 2017-09-27 PROCEDURE — 73562 X-RAY EXAM OF KNEE 3: CPT | Mod: 26,LT,, | Performed by: RADIOLOGY

## 2017-09-27 PROCEDURE — 73560 X-RAY EXAM OF KNEE 1 OR 2: CPT | Mod: TC,RT

## 2017-09-27 PROCEDURE — 3008F BODY MASS INDEX DOCD: CPT | Mod: S$GLB,,, | Performed by: ORTHOPAEDIC SURGERY

## 2017-09-27 PROCEDURE — 99203 OFFICE O/P NEW LOW 30 MIN: CPT | Mod: 25,S$GLB,, | Performed by: ORTHOPAEDIC SURGERY

## 2017-09-27 PROCEDURE — 20610 DRAIN/INJ JOINT/BURSA W/O US: CPT | Mod: LT,S$GLB,, | Performed by: ORTHOPAEDIC SURGERY

## 2017-09-27 RX ORDER — TRIAMCINOLONE ACETONIDE 40 MG/ML
40 INJECTION, SUSPENSION INTRA-ARTICULAR; INTRAMUSCULAR
Status: COMPLETED | OUTPATIENT
Start: 2017-09-27 | End: 2017-09-27

## 2017-09-27 RX ADMIN — TRIAMCINOLONE ACETONIDE 40 MG: 40 INJECTION, SUSPENSION INTRA-ARTICULAR; INTRAMUSCULAR at 09:09

## 2017-09-27 NOTE — LETTER
September 27, 2017      Era Richardson MD  [address]  [facility] [address]           [facility] - Orthopedics  [address]  [facility] [address]  Phone: [phone]          Patient: Serenity Powell   MR Number: [id number]   YOB: 1960   Date of Visit: 9/27/2017       Dear Dr. Era Richardson:    Thank you for referring Serenity Powell to me for evaluation. Attached you will find relevant portions of my assessment and plan of care.    If you have questions, please do not hesitate to call me. I look forward to following Serenity Powell along with you.    Sincerely,    Josias Duff MD    Enclosure  CC:  No Recipients    If you would like to receive this communication electronically, please contact [email or web address] or [phone] to request more information on "[employer]" Link access.    For providers and/or their staff who would like to refer a patient to [facility], please contact us through our one-stop-shop provider referral line, [facility] , at [phone].    If you feel you have received this communication in error or would no longer like to receive these types of communications, please e-mail [email or web address]

## 2017-09-27 NOTE — PROGRESS NOTES
"Serenity Powell is a *** year old {MALE/FEMALE:21130} here with a *** {DAYS, WEEKS ,MONTHS:21894} history of {RIGHT LEFT BILATERAL:21113} knee pain. The patient is a  {occupation/activities:319732}. There {WAS/WAS NOT:1149496455::"was not"} a history  of trauma.  The pain is {DESC; MILD/MOD/SEVERE:84441} The pain is located in the {Knee pain location:04685} aspect of the knee. There is {IS / IS NOT:61680} radiation.  The pain radaites to the ***.  There {IS / IS NOT:72644} catching or locking.   The pain is described as {Desc; dull;sharp;burning;achy:64097}. The patient {HAS HAS NOT:84045} had prior surgery. It is aggravated by {HH ACTIVITY/POSITION:599680}.  It {IS / IS NOT:81385} alleviated by rest. There {IS / IS NOT:64983} numbness or tingling of the lower extremity.  There {IS / IS NOT:01178} back pain.  She  {HAS HAS NOT:94062} tried medications or injections. They {HAVE/HAVE NOT:77966} helped.  She {does/does not:12629} have difficulty getting in or out of a car, getting dressed, or going up or down stairs.  The patient {does/does not:26099} use an assistive device.     Past Medical History:   Diagnosis Date    Diabetes mellitus     Hypertension     Multiple sclerosis     Stroke        Current Outpatient Prescriptions on File Prior to Visit   Medication Sig Dispense Refill    aspirin 81 MG Chew Take 1 tablet (81 mg total) by mouth once daily.      atorvastatin (LIPITOR) 20 MG tablet Take 1 tablet (20 mg total) by mouth every evening. 90 tablet 1    AVONEX 30 mcg/0.5 mL injection INJECT 1 SYRINGE INTRAMUSCULARLY EVERY WEEK 1 kit 5    BD INSULIN PEN NEEDLE UF SHORT 31 gauge x 5/16" Ndle       BD LUER-LINO SYRINGE 3 mL 25 gauge x 1" Syrg 1 each by Misc.(Non-Drug; Combo Route) route every 30 days. Use with b-12 shot  1    BLOOD SUGAR DIAGNOSTIC (CONTOUR NEXT STRIPS MISC) 1 each by Misc.(Non-Drug; Combo Route) route 4 (four) times daily.       CALCIUM CARBONATE/VITAMIN D3 (VITAMIN D-3 ORAL) Take 5,000 " "Units by mouth once daily.       cyanocobalamin 1,000 mcg/mL injection Inject 1000mcg (1ml) IM daily for 1 week, once a week for 1 month, then once a month thereafter 10 mL 3    hydrochlorothiazide (HYDRODIURIL) 25 MG tablet TAKE 1 TABLET (25 MG TOTAL) BY MOUTH ONCE DAILY. 90 tablet 0    insulin aspart (NOVOLOG FLEXPEN) 100 unit/mL InPn pen 8 units breakfast / 4 units lunch / 8 units dinner  + sliding scale 18 mL 3    insulin glargine (LANTUS SOLOSTAR) 100 unit/mL (3 mL) InPn pen Inject 40 Units into the skin every evening. Contact MD for any change +/- 4 units 18 Syringe 1    lisinopril (PRINIVIL,ZESTRIL) 40 MG tablet TAKE 1 TABLET (40 MG TOTAL) BY MOUTH ONCE DAILY. 90 tablet 3    metformin (GLUCOPHAGE) 1000 MG tablet TAKE 1 TABLET (1,000 MG TOTAL) BY MOUTH 2 (TWO) TIMES DAILY. 180 tablet 0    metoprolol tartrate (LOPRESSOR) 50 MG tablet TAKE 1 TABLET (50 MG TOTAL) BY MOUTH 2 (TWO) TIMES DAILY. 180 tablet 5    pen needle, diabetic (BD INSULIN PEN NEEDLE UF MINI) 31 gauge x 3/16" Ndle 1 Box by Misc.(Non-Drug; Combo Route) route 3 (three) times daily. 90 each 3    syringe with needle, safety (INTEGRA SYRINGE) 1 mL 25 x 1" Syrg 25g 1in needle with 1cc syringes to be used for B12 injections. 12 Syringe 1    venlafaxine (EFFEXOR-XR) 150 MG Cp24 Take 1 capsule (150 mg total) by mouth once daily. 90 capsule 1    venlafaxine (EFFEXOR-XR) 150 MG Cp24 TAKE 1 CAPSULE (150 MG TOTAL) BY MOUTH ONCE DAILY. 90 capsule 0    lancing device with lancets (ACCU-CHEK MULTICLIX LANCET) Kit 1 Box by Misc.(Non-Drug; Combo Route) route once daily. 1 each 0     Current Facility-Administered Medications on File Prior to Visit   Medication Dose Route Frequency Provider Last Rate Last Dose    bupivacaine 0.5% (5 mg/ml) injection    PRN Lainey Albarran MD   10 mL at 13 2435       Past Surgical History:   Procedure Laterality Date     SECTION, CLASSIC      CHOLECYSTECTOMY      COLONOSCOPY N/A 2016    " Procedure: COLONOSCOPY;  Surgeon: Chase Wise MD;  Location: [facility];  Service: Endoscopy;  Laterality: N/A;    HYSTERECTOMY         History reviewed. No pertinent family history.    Social History     Social History    Marital status:      Spouse name: N/A    Number of children: N/A    Years of education: N/A     Occupational History    Not on file.     Social History Main Topics    Smoking status: Current Every Day Smoker     Packs/day: 0.75     Years: 30.00    Smokeless tobacco: Never Used    Alcohol use No    Drug use: No    Sexual activity: Yes     Partners: Male     Birth control/ protection: None     Other Topics Concern    Not on file     Social History Narrative    No narrative on file

## 2017-09-27 NOTE — PROGRESS NOTES
Subjective:      Patient ID: Serenity Powell is a 57 y.o. female.    Chief Complaint: Pain of the Left Knee    HPI  Serenity Powell is a 57 year old female here with a several year history of bilateral knee pain.  She has MS which is stable.  She does have a left foot drop secondary to this. Left worse than right.  The patient is an  . There was not a history  of trauma.  The pain is severe on the left. The pain is located in the global aspect of the knee. There is is not radiation. There is catching or locking.   The pain is described as achy. The patient has not had prior surgery. It is aggravated by standing and walking.  It is not alleviated by rest. There is not numbness or tingling of the lower extremity.  There is not back pain.  She  has tried medications and injections in the past. They have helped.  She does have difficulty getting in or out of a car, getting dressed, or going up or down stairs.  The patient does not use an assistive device  Past Medical History:   Diagnosis Date    Diabetes mellitus     Hypertension     Multiple sclerosis     Stroke      Past Surgical History:   Procedure Laterality Date     SECTION, CLASSIC      CHOLECYSTECTOMY      COLONOSCOPY N/A 2016    Procedure: COLONOSCOPY;  Surgeon: Chase Wise MD;  [facility];  Service: Endoscopy;  Laterality: N/A;    HYSTERECTOMY       History reviewed. No pertinent family history.  Social History     Social History    Marital status:      Spouse name: N/A    Number of children: N/A    Years of education: N/A     Occupational History    Not on file.     Social History Main Topics    Smoking status: Current Every Day Smoker     Packs/day: 0.75     Years: 30.00    Smokeless tobacco: Never Used    Alcohol use No    Drug use: No    Sexual activity: Yes     Partners: Male     Birth control/ protection: None     Other Topics Concern    Not on file     Social History Narrative     "No narrative on file     Current Outpatient Prescriptions on File Prior to Visit   Medication Sig Dispense Refill    aspirin 81 MG Chew Take 1 tablet (81 mg total) by mouth once daily.      atorvastatin (LIPITOR) 20 MG tablet Take 1 tablet (20 mg total) by mouth every evening. 90 tablet 1    AVONEX 30 mcg/0.5 mL injection INJECT 1 SYRINGE INTRAMUSCULARLY EVERY WEEK 1 kit 5    BD INSULIN PEN NEEDLE UF SHORT 31 gauge x 5/16" Ndle       BD LUER-LINO SYRINGE 3 mL 25 gauge x 1" Syrg 1 each by Misc.(Non-Drug; Combo Route) route every 30 days. Use with b-12 shot  1    BLOOD SUGAR DIAGNOSTIC (CONTOUR NEXT STRIPS MISC) 1 each by Misc.(Non-Drug; Combo Route) route 4 (four) times daily.       CALCIUM CARBONATE/VITAMIN D3 (VITAMIN D-3 ORAL) Take 5,000 Units by mouth once daily.       cyanocobalamin 1,000 mcg/mL injection Inject 1000mcg (1ml) IM daily for 1 week, once a week for 1 month, then once a month thereafter 10 mL 3    hydrochlorothiazide (HYDRODIURIL) 25 MG tablet TAKE 1 TABLET (25 MG TOTAL) BY MOUTH ONCE DAILY. 90 tablet 0    insulin aspart (NOVOLOG FLEXPEN) 100 unit/mL InPn pen 8 units breakfast / 4 units lunch / 8 units dinner  + sliding scale 18 mL 3    insulin glargine (LANTUS SOLOSTAR) 100 unit/mL (3 mL) InPn pen Inject 40 Units into the skin every evening. Contact MD for any change +/- 4 units 18 Syringe 1    lisinopril (PRINIVIL,ZESTRIL) 40 MG tablet TAKE 1 TABLET (40 MG TOTAL) BY MOUTH ONCE DAILY. 90 tablet 3    metformin (GLUCOPHAGE) 1000 MG tablet TAKE 1 TABLET (1,000 MG TOTAL) BY MOUTH 2 (TWO) TIMES DAILY. 180 tablet 0    metoprolol tartrate (LOPRESSOR) 50 MG tablet TAKE 1 TABLET (50 MG TOTAL) BY MOUTH 2 (TWO) TIMES DAILY. 180 tablet 5    pen needle, diabetic (BD INSULIN PEN NEEDLE UF MINI) 31 gauge x 3/16" Ndle 1 Box by Misc.(Non-Drug; Combo Route) route 3 (three) times daily. 90 each 3    syringe with needle, safety (INTEGRA SYRINGE) 1 mL 25 x 1" Syrg 25g 1in needle with 1cc syringes to be " "used for B12 injections. 12 Syringe 1    venlafaxine (EFFEXOR-XR) 150 MG Cp24 Take 1 capsule (150 mg total) by mouth once daily. 90 capsule 1    venlafaxine (EFFEXOR-XR) 150 MG Cp24 TAKE 1 CAPSULE (150 MG TOTAL) BY MOUTH ONCE DAILY. 90 capsule 0    lancing device with lancets (ACCU-CHEK MULTICLIX LANCET) Kit 1 Box by Misc.(Non-Drug; Combo Route) route once daily. 1 each 0     Current Facility-Administered Medications on File Prior to Visit   Medication Dose Route Frequency Provider Last Rate Last Dose    bupivacaine 0.5% (5 mg/ml) injection    PRN Lainey Albarran MD   10 mL at 04/13/13 3535     Review of patient's allergies indicates:  No Known Allergies    Review of Systems   Constitution: Negative for chills, fever and night sweats.   HENT: Negative for hearing loss.    Eyes: Negative for blurred vision and double vision.   Cardiovascular: Negative for chest pain, claudication and leg swelling.   Respiratory: Negative for shortness of breath.    Endocrine: Negative for polydipsia, polyphagia and polyuria.   Hematologic/Lymphatic: Negative for adenopathy and bleeding problem. Does not bruise/bleed easily.   Skin: Negative for poor wound healing.   Musculoskeletal: Positive for back pain and joint pain.   Gastrointestinal: Negative for diarrhea and heartburn.   Genitourinary: Negative for bladder incontinence.   Neurological: Negative for focal weakness, headaches, numbness, paresthesias and sensory change.   Psychiatric/Behavioral: The patient is not nervous/anxious.    Allergic/Immunologic: Negative for persistent infections.         Objective:      Body mass index is 34.71 kg/m².  Vitals:    09/27/17 0848   Weight: 94.6 kg (208 lb 8.9 oz)   Height: 5' 5" (1.651 m)         General    Constitutional: She is oriented to person, place, and time. She appears well-developed and well-nourished.   HENT:   Head: Normocephalic and atraumatic.   Eyes: EOM are normal.   Cardiovascular: Normal rate.  "   Pulmonary/Chest: Effort normal.   Neurological: She is alert and oriented to person, place, and time.   Psychiatric: She has a normal mood and affect. Her behavior is normal.     General Musculoskeletal Exam   Gait: normal   Pelvic Obliquity: none      Right Knee Exam     Inspection   Alignment:  normal  Erythema: absent  Scars: absent  Swelling: absent  Effusion: effusion  Deformity: deformity  Bruising: absent    Tenderness   The patient is experiencing no tenderness.         Crepitus   The patient has crepitus of the patella.    Range of Motion   Extension: 0   Flexion: 130     Tests   Ligament Examination Lachman: normal (-1 to 2mm)   MCL - Valgus: normal (0 to 2mm)  LCL - Varus: normal  Patella   Passive Patellar Tilt: neutral  J-Sign: present    Other   Sensation: normal    Left Knee Exam     Inspection   Alignment:  normal  Erythema: absent  Scars: absent  Swelling: present  Effusion: absent  Deformity: deformity  Bruising: absent    Tenderness   The patient tender to palpation of the medial joint line and lateral joint line.    Range of Motion   Extension: -20   Flexion: 130 (Pasive motion.  No active flexion)     Tests   Stability Lachman: normal (-1 to 2mm)   MCL - Valgus: normal (0 to 2mm)  LCL - Varus: normal (0 to 2mm)  Patella   Passive Patellar Tilt: neutral    Other   Sensation: normal    Comments:  Generalized mil atrophy.        Superficial ulcer over medial malleolus from brace    Right Hip Exam     Inspection   Scars: absent  Swelling: absent  Bruising: absent  No deformity of hip.  Quadriceps Atrophy:  Negative  Erythema: absent    Range of Motion   Extension: 0   Flexion: 100   Internal Rotation: 25   External Rotation: 30   Abduction: 25   Adduction: 20     Tests   Pain w/ forced internal rotation (CAMELIA): absent  Stinchfield test: negative    Other   Sensation: normal  Left Hip Exam     Inspection   Scars: absent  Swelling: absent  No deformity of hip.  Quadriceps Atrophy:   negative  Erythema: absent  Bruising: absent    Range of Motion   Extension: 0   Flexion: 100   Internal Rotation: 25   External Rotation: 30   Abduction: 25   Adduction: 20     Tests   Pain w/ forced internal rotation (CAMELIA): absent  Stinchfield test: negative    Other   Sensation: normal      Back (L-Spine & T-Spine) / Neck (C-Spine) Exam   Back exam is normal.      Muscle Strength   Right Lower Extremity   Hip Abduction: 5/5   Hip Adduction: 5/5   Hip Flexion: 5/5   Quadriceps:  5/5   Hamstrin/5   Ankle Dorsiflexion:  5/5   Left Lower Extremity   Hip Abduction: 4/5   Hip Adduction: 5/5   Hip Flexion: 2/5   Quadriceps:  4/5   Hamstrin/5   Ankle Dorsiflexion:  2/5     Reflexes     Left Side  Quadriceps:  2+    Right Side   Quadriceps:  2+    Vascular Exam     Right Pulses  Dorsalis Pedis:      2+          Left Pulses  Dorsalis Pedis:      2+          Capillary Refill  Right Hand: normal capillary refill  Left Hand: normal capillary refill    Edema  Right Upper Leg: absent  Right Lower Leg: absent  Left Upper Leg: absent  Left Lower Leg: absent    Radiographs taken today and reviewed by me demonstrate moderately severe arthritic change of the bilateral KNEE(s).There  is not bone destruction.  There is not a fracture.   The changes are tricompartmental.              Assessment:       Encounter Diagnoses   Name Primary?    Osteoarthritis of left knee, unspecified osteoarthritis type Yes    Unstable knee, left     Multiple sclerosis           Plan:       Serenity was seen today for pain.    Diagnoses and all orders for this visit:    Osteoarthritis of left knee, unspecified osteoarthritis type    Unstable knee, left    Multiple sclerosis    Other orders  -     triamcinolone acetonide injection 40 mg; Inject 1 mL (40 mg total) into the articular space one time.        Treatment options have been discussed.  We have decided to proceed with a  cortico- steroid injection.  The risk of elevated blood glucose was  discussed..    After time out was performed and patient ID, side, and site were verified, the left knee  was prepped in the standard sterile fashion.  A 22-gauge needle was introduced into the left knee joint from an gertrude-lateral site without complication. The left knee(s) was then injected with 40mg of kenalog.  Sterile dressing was applied.  The patient was informed that they may resume activities as tolerated. She was instructed to call if there were any problems.     We will see Serenity LLOYD Powell  back in 8 weeks to see how she has responded.      We discussed knee replacement for the left knee.  This is likely going to be something she will need in the future.  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  She has very weak hip flexors and quadriceps secondary to her multiple sclerosis.  She would likely need a rotating hinge knee.  I also broached the subject of knee arthrodesis (fusion).  She is not interested in this.  When I see her back following the injection we will discuss knee replacement in more detail.

## 2017-10-04 DIAGNOSIS — E11.8 TYPE 2 DIABETES MELLITUS WITH COMPLICATION: ICD-10-CM

## 2017-10-04 RX ORDER — PEN NEEDLE, DIABETIC 30 GX3/16"
1 NEEDLE, DISPOSABLE MISCELLANEOUS 3 TIMES DAILY
Qty: 90 EACH | Refills: 3 | Status: SHIPPED | OUTPATIENT
Start: 2017-10-04

## 2017-10-08 DIAGNOSIS — F17.200 NICOTINE DEPENDENCE: ICD-10-CM

## 2017-10-12 DIAGNOSIS — G35 MULTIPLE SCLEROSIS: Primary | ICD-10-CM

## 2017-10-17 ENCOUNTER — TELEPHONE (OUTPATIENT)
Dept: NEUROLOGY | Facility: CLINIC | Age: 57
End: 2017-10-17

## 2017-10-17 DIAGNOSIS — Z79.899 HIGH RISK MEDICATION USE: ICD-10-CM

## 2017-10-17 DIAGNOSIS — G35 MULTIPLE SCLEROSIS: Primary | ICD-10-CM

## 2017-10-18 ENCOUNTER — LAB VISIT (OUTPATIENT)
Dept: LAB | Facility: HOSPITAL | Age: 57
End: 2017-10-18
Attending: PSYCHIATRY & NEUROLOGY
Payer: COMMERCIAL

## 2017-10-18 DIAGNOSIS — Z79.899 HIGH RISK MEDICATION USE: ICD-10-CM

## 2017-10-18 DIAGNOSIS — G35 MULTIPLE SCLEROSIS: ICD-10-CM

## 2017-10-18 LAB
ALBUMIN SERPL BCP-MCNC: 3.3 G/DL
ALP SERPL-CCNC: 71 U/L
ALT SERPL W/O P-5'-P-CCNC: 32 U/L
AST SERPL-CCNC: 19 U/L
BASOPHILS # BLD AUTO: 0.04 K/UL
BASOPHILS NFR BLD: 0.4 %
BILIRUB DIRECT SERPL-MCNC: 0.1 MG/DL
BILIRUB SERPL-MCNC: 0.2 MG/DL
DIFFERENTIAL METHOD: ABNORMAL
EOSINOPHIL # BLD AUTO: 0.2 K/UL
EOSINOPHIL NFR BLD: 2.1 %
ERYTHROCYTE [DISTWIDTH] IN BLOOD BY AUTOMATED COUNT: 12.2 %
HCT VFR BLD AUTO: 37.6 %
HGB BLD-MCNC: 12.5 G/DL
IMM GRANULOCYTES # BLD AUTO: 0.03 K/UL
IMM GRANULOCYTES NFR BLD AUTO: 0.3 %
LYMPHOCYTES # BLD AUTO: 3.6 K/UL
LYMPHOCYTES NFR BLD: 40.2 %
MCH RBC QN AUTO: 31.3 PG
MCHC RBC AUTO-ENTMCNC: 33.2 G/DL
MCV RBC AUTO: 94 FL
MONOCYTES # BLD AUTO: 0.6 K/UL
MONOCYTES NFR BLD: 6.3 %
NEUTROPHILS # BLD AUTO: 4.5 K/UL
NEUTROPHILS NFR BLD: 50.7 %
NRBC BLD-RTO: 0 /100 WBC
PLATELET # BLD AUTO: 255 K/UL
PMV BLD AUTO: 11.6 FL
PROT SERPL-MCNC: 7.6 G/DL
RBC # BLD AUTO: 4 M/UL
WBC # BLD AUTO: 8.89 K/UL

## 2017-10-18 PROCEDURE — 85025 COMPLETE CBC W/AUTO DIFF WBC: CPT

## 2017-10-18 PROCEDURE — 36415 COLL VENOUS BLD VENIPUNCTURE: CPT | Mod: PO

## 2017-10-18 PROCEDURE — 80076 HEPATIC FUNCTION PANEL: CPT

## 2017-10-19 RX ORDER — INTERFERON BETA-1A 30MCG/.5ML
KIT INTRAMUSCULAR
Qty: 3 KIT | Refills: 1 | Status: SHIPPED | OUTPATIENT
Start: 2017-10-19 | End: 2018-01-01 | Stop reason: SDUPTHER

## 2017-11-20 NOTE — PATIENT INSTRUCTIONS
Recommendations for today    Especially of blood sugar in the evening is less than 90 write down what she had for lunch.  This can help us determine changes that could be made to lunchtime meal planning to avoid hypoglycemia during the day    Before dinner number > 130 consistently contact the clinic for additional recommendations regarding insulin         Recommendations for today    Reduce Lantus.  Start taking 40 units starting tonight and then check my [facility] for recommendations on changes you should make to mealtime insulin starting tomorrow.  I will send us message later today.    To avoid low sugar do not take full dosage of mealtime insulin if you are snacking on something 300 christi or less.  Instead give snack time insulin dosage of 2 units.    Mealtime insulin basic dose should be   4 units      In addition to correction scale indicated below depending on pre-meal sugar number.    Sliding Scale       LOW DOSE  < 70 mg/dL    TREAT LOW SUGAR AND CALL CLINIC  [phone]  70 - 130 mg/dL  0 units    131 - 180 mg/dL  2 units   181 -240 mg/dL  4 units    241 - 300 mg/dL  6 units    301- 350 mg/dL   8 units   351 - 400 mg/dL  10 units  > 400 mg/dL   Stat lab glucose and call physician with results.

## 2017-11-21 NOTE — PROGRESS NOTES
Portions of this note are generated with voice recognition software. Typographical errors may exist.     SUBJECTIVE:    This is a/an 57 y.o. female here for primary care visit for  Chief Complaint   Patient presents with    Follow-up     diabetic check up      Patient having some mild hypoglycemic readings mostly pre-meal before dinner.  Confirms that she is taking Lantus in the evening.  Interestingly numbers in the morning are typically the highest.  Denies overnight hypoglycemia.  Complying with mealtime insulin but not as rigid as before.    Psychosocial stressors remain.  The daughter has moved in with her grandson.  Patient still not ready to make further attempts at quitting cigarettes        Medications Reviewed and Updated    Past medical, family, and social histories were reviewed and updated.    Review of Systems negative unless otherwise noted in history of present illness-  ROS    General ROS: negative  Psychological ROS: negative  ENT ROS: negative  Allergy and Immunology ROS: negative  Gastrointestinal ROS: negative  Genito-Urinary ROS: negative  Musculoskeletal ROS: negative  Neurological ROS: negative        Allergic:  Review of patient's allergies indicates:  No Known Allergies    OBJECTIVE:  BP: 138/70 Pulse: 78    Wt Readings from Last 3 Encounters:   11/20/17 94 kg (207 lb 3.7 oz)   09/27/17 94.6 kg (208 lb 8.9 oz)   09/20/17 94.1 kg (207 lb 7.3 oz)    Body mass index is 34.49 kg/m².  Previous Blood Pressure Readings :   BP Readings from Last 3 Encounters:   11/20/17 138/70   09/20/17 138/78   09/05/17 124/65       Physical Exam    GEN: No apparent distress  HEENT: sclera non-icteric, conjunctiva clear  CV: no peripheral edema  PULM: breathing non-labored  ABD: Obese, protuberant abdomen.  PSYCH: appropriate affect  MSK: able to rise from chair without assistance  SKIN: normal skin turgor    Pertinent Labs Reviewed       ASSESSMENT/PLAN:    Uncontrolled type 2 diabetes mellitus with  hyperglycemia, with long-term current use of insulin.Condition improving.  Counseling on self-care measures today.  Continue with current plan.   -     Hemoglobin A1c; Future; Expected date: 11/20/2017  -     insulin aspart (NOVOLOG FLEXPEN) 100 unit/mL InPn pen; 4 units  + sliding scale  Dispense: 18 mL; Refill: 3    Tobacco dependence .Condition not optimally controlled.  Patient declines definitive treatment plan.  Plan to readdress at every clinic visit.    Future Appointments  Date Time Provider Department Center   11/27/2017 10:15 AM Josias Duff MD [facility]   2/20/2018 8:00 AM Chase Nelson MD [facility]       Chase Nelson  11/20/2017  6:25 PM

## 2017-11-27 NOTE — PROGRESS NOTES
"Subjective:      Patient ID: Serenity Powell is a 57 y.o. female.    Chief Complaint: Pain of the Left Knee    HPI  Serenity Powell has left knee pain.  The pain has slightly improved and is unchanged. Good relief for two weeks, but has returned to baseline since injection. The pain is located in the anterior, medial, lateral aspect of the knee.  There  is not radiation.   There is not associated stiffness.   There is not catching and locking. The pain is described as achy. The pain is aggravated by standing and walking.  It is alleviated by rest.  There is not associated back pain.  Her history, medications and problem list were reviewed.    Review of Systems   Constitution: Negative for chills, fever and night sweats.   HENT: Negative for hearing loss.    Eyes: Negative for blurred vision and double vision.   Cardiovascular: Negative for chest pain, claudication and leg swelling.   Respiratory: Negative for shortness of breath.    Endocrine: Negative for polydipsia, polyphagia and polyuria.   Hematologic/Lymphatic: Negative for adenopathy and bleeding problem. Does not bruise/bleed easily.   Skin: Negative for poor wound healing.   Musculoskeletal: Positive for joint pain.   Gastrointestinal: Negative for diarrhea and heartburn.   Genitourinary: Negative for bladder incontinence.   Neurological: Positive for focal weakness. Negative for headaches, numbness, paresthesias and sensory change.   Psychiatric/Behavioral: The patient is not nervous/anxious.    Allergic/Immunologic: Negative for persistent infections.         Objective:      Body mass index is 34.32 kg/m².  Vitals:    11/27/17 1111   Weight: 93.6 kg (206 lb 3.9 oz)   Height: 5' 5" (1.651 m)           General    Constitutional: She is oriented to person, place, and time. She appears well-developed and well-nourished.   HENT:   Head: Normocephalic and atraumatic.   Eyes: EOM are normal.   Cardiovascular: Normal rate.    Pulmonary/Chest: Effort normal. "   Neurological: She is alert and oriented to person, place, and time.   Psychiatric: She has a normal mood and affect. Her behavior is normal.     General Musculoskeletal Exam   Gait: normal       Right Knee Exam     Inspection   Erythema: absent  Scars: absent  Swelling: absent  Effusion: effusion  Deformity: deformity  Bruising: absent    Tenderness   The patient is experiencing no tenderness.         Range of Motion   Extension: 0   Flexion: 130     Tests   Ligament Examination Lachman: normal (-1 to 2mm)   MCL - Valgus: normal (0 to 2mm)  LCL - Varus: normal  Patella   Passive Patellar Tilt: neutral    Other   Sensation: normal    Left Knee Exam     Inspection   Erythema: absent  Scars: absent  Swelling: absent  Effusion: absent  Deformity: deformity  Bruising: absent    Tenderness   The patient is experiencing no tenderness.         Range of Motion   Extension: -20 (No active extension)   Flexion: 130     Tests   Stability Lachman: normal (-1 to 2mm)   MCL - Valgus: normal (0 to 2mm)  LCL - Varus: normal (0 to 2mm)  Patella   Passive Patellar Tilt: neutral    Other   Sensation: normal    Muscle Strength   Right Lower Extremity   Hip Abduction: 5/5   Quadriceps:  5/5   Hamstrin/5   Left Lower Extremity   Hip Abduction: 5/5   Quadriceps:  5/5   Hamstrin/5     Reflexes     Left Side  Quadriceps:  2+    Right Side   Quadriceps:  2+    Vascular Exam     Right Pulses  Dorsalis Pedis:      2+          Left Pulses  Dorsalis Pedis:      2+          Edema  Right Lower Leg: absent  Left Lower Leg: absent              Assessment:       Encounter Diagnoses   Name Primary?    Osteoarthritis of left knee, unspecified osteoarthritis type Yes    Unstable knee, left     Multiple sclerosis           Plan:       Serenity was seen today for pain.    Diagnoses and all orders for this visit:    Osteoarthritis of left knee, unspecified osteoarthritis type  -     Prior Authorization Order    Unstable knee, left  -     Prior  Authorization Order    Multiple sclerosis  -     Prior Authorization Order    Other orders  -     EUFLEXXA 10 mg/mL(mw 2.4 -3.6 million) injection Syrg 20 mg; Inject 2 mLs (20 mg total) into the articular space once a week.        Treatment options have been discussed.  We have decided to proceed with left knee Euflexxa injections.  The risk of pseudoseptic reactions were discussed, as was the minimal risk of infection.  Serenity DESAI Sherry will return for her first injection..

## 2017-12-08 NOTE — LETTER
December 8, 2017      Josias Duff MD  [address]  [facility] [address]           [facility] - Orthopedics  [address], [address]  [facility] [address]  Phone: [phone]          Patient: Serenity Powell   MR Number: [id number]   YOB: 1960   Date of Visit: 12/8/2017       Dear Dr. Josias Duff:    Thank you for referring Serenity Powell to me for evaluation. Attached you will find relevant portions of my assessment and plan of care.    If you have questions, please do not hesitate to call me. I look forward to following Serenity Powell along with you.    Sincerely,    Rocio Salvador PA-C    Enclosure  CC:  No Recipients    If you would like to receive this communication electronically, please contact [email or web address] or [phone] to request more information on [facility] access.    For providers and/or their staff who would like to refer a patient to [facility], please contact us through our one-stop-shop provider referral line, [facility] , at [phone].    If you feel you have received this communication in error or would no longer like to receive these types of communications, please e-mail [email or web address]

## 2017-12-08 NOTE — PROGRESS NOTES
Serenity Powell presents to clinic today for the first left knee Euflexxa injection.    Exam demonstrates the no effusion in the  left knee, and the skin is intact.    Diagnosis: osteoarthritis knee    After time out was performed and patient ID, side, and site were verified, the  left  knee was sterilly prepped in the standard fashion.  A 22-gauge needle was introduced into left knee joint from an gertrude-lateral site without complication and knee was then injected with 2 ml of Euflexxa.  Sterile dressing was applied.  The patient was instructed to resume activities as tolerated and to call with any problems.     We will see Serenity Powell back next week for the second injection.

## 2017-12-18 NOTE — PROGRESS NOTES
Serenity Powell presents to clinic today for the second left knee Euflexxa injection.    Exam demonstrates the no effusion in the  left knee, and the skin is intact.    Diagnosis: osteoarthritis knee    After time out was performed and patient ID, side, and site were verified, the  left  knee was sterilly prepped in the standard fashion.  A 22-gauge needle was introduced into left knee joint from an gertrude-lateral site without complication and knee was then injected with 2 ml of Euflexxa.  Sterile dressing was applied.  The patient was instructed to resume activities as tolerated and to call with any problems.     We will see Serenity Powell back next week for the third injection.

## 2017-12-29 NOTE — PROGRESS NOTES
Serenity Powell presents to clinic today for the third left knee Euflexxa injection.    Exam demonstrates the no effusion in the  left knee, and the skin is intact.    Diagnosis: osteoarthritis knee    After time out was performed and patient ID, side, and site were verified, the  left  knee was sterilly prepped in the standard fashion.  A 22-gauge needle was introduced into left knee joint from an gertrude-lateral site without complication and knee was then injected with 2 ml of Euflexxa.  Sterile dressing was applied.  The patient was instructed to resume activities as tolerated and to call with any problems.     F/u prn.

## 2018-01-01 ENCOUNTER — CLINICAL SUPPORT (OUTPATIENT)
Dept: SMOKING CESSATION | Facility: CLINIC | Age: 58
End: 2018-01-01
Payer: COMMERCIAL

## 2018-01-01 ENCOUNTER — HOSPITAL ENCOUNTER (OUTPATIENT)
Dept: RADIOLOGY | Facility: HOSPITAL | Age: 58
Discharge: HOME | End: 2018-04-24
Attending: PHYSICIAN ASSISTANT
Payer: COMMERCIAL

## 2018-01-01 ENCOUNTER — PATIENT MESSAGE (OUTPATIENT)
Dept: NEUROLOGY | Facility: CLINIC | Age: 58
End: 2018-01-01

## 2018-01-01 ENCOUNTER — CLINICAL SUPPORT (OUTPATIENT)
Dept: REHABILITATION | Facility: HOSPITAL | Age: 58
End: 2018-01-01
Attending: PSYCHIATRY & NEUROLOGY
Payer: COMMERCIAL

## 2018-01-01 ENCOUNTER — TELEPHONE (OUTPATIENT)
Dept: RESEARCH | Facility: HOSPITAL | Age: 58
End: 2018-01-01

## 2018-01-01 ENCOUNTER — LAB VISIT (OUTPATIENT)
Dept: LAB | Facility: HOSPITAL | Age: 58
End: 2018-01-01
Attending: INTERNAL MEDICINE
Payer: COMMERCIAL

## 2018-01-01 ENCOUNTER — HOSPITAL ENCOUNTER (INPATIENT)
Facility: HOSPITAL | Age: 58
LOS: 1 days | DRG: 853 | End: 2018-11-07
Attending: EMERGENCY MEDICINE | Admitting: SURGERY
Payer: COMMERCIAL

## 2018-01-01 ENCOUNTER — TELEPHONE (OUTPATIENT)
Dept: NEUROLOGY | Facility: CLINIC | Age: 58
End: 2018-01-01

## 2018-01-01 ENCOUNTER — OFFICE VISIT (OUTPATIENT)
Dept: INTERNAL MEDICINE | Facility: CLINIC | Age: 58
End: 2018-01-01
Payer: COMMERCIAL

## 2018-01-01 ENCOUNTER — TELEPHONE (OUTPATIENT)
Dept: PSYCHIATRY | Facility: CLINIC | Age: 58
End: 2018-01-01

## 2018-01-01 ENCOUNTER — HOSPITAL ENCOUNTER (OUTPATIENT)
Dept: RADIOLOGY | Facility: HOSPITAL | Age: 58
Discharge: HOME | End: 2018-03-02
Attending: INTERNAL MEDICINE
Payer: COMMERCIAL

## 2018-01-01 ENCOUNTER — OFFICE VISIT (OUTPATIENT)
Dept: NEUROLOGY | Facility: CLINIC | Age: 58
End: 2018-01-01
Payer: COMMERCIAL

## 2018-01-01 ENCOUNTER — PATIENT MESSAGE (OUTPATIENT)
Dept: INTERNAL MEDICINE | Facility: CLINIC | Age: 58
End: 2018-01-01

## 2018-01-01 ENCOUNTER — ANESTHESIA EVENT (OUTPATIENT)
Dept: SURGERY | Facility: HOSPITAL | Age: 58
DRG: 853 | End: 2018-01-01
Payer: COMMERCIAL

## 2018-01-01 ENCOUNTER — LAB VISIT (OUTPATIENT)
Dept: LAB | Facility: HOSPITAL | Age: 58
End: 2018-01-01
Attending: PSYCHIATRY & NEUROLOGY
Payer: COMMERCIAL

## 2018-01-01 ENCOUNTER — CLINICAL SUPPORT (OUTPATIENT)
Dept: FAMILY MEDICINE | Facility: CLINIC | Age: 58
End: 2018-01-01
Attending: INTERNAL MEDICINE
Payer: COMMERCIAL

## 2018-01-01 ENCOUNTER — ANESTHESIA (OUTPATIENT)
Dept: SURGERY | Facility: HOSPITAL | Age: 58
DRG: 853 | End: 2018-01-01
Payer: COMMERCIAL

## 2018-01-01 VITALS
WEIGHT: 208.31 LBS | HEIGHT: 65 IN | HEART RATE: 76 BPM | SYSTOLIC BLOOD PRESSURE: 134 MMHG | BODY MASS INDEX: 34.71 KG/M2 | DIASTOLIC BLOOD PRESSURE: 72 MMHG

## 2018-01-01 VITALS
BODY MASS INDEX: 34.57 KG/M2 | HEIGHT: 65 IN | WEIGHT: 207.5 LBS | DIASTOLIC BLOOD PRESSURE: 60 MMHG | HEART RATE: 62 BPM | SYSTOLIC BLOOD PRESSURE: 139 MMHG

## 2018-01-01 VITALS
OXYGEN SATURATION: 96 % | DIASTOLIC BLOOD PRESSURE: 88 MMHG | BODY MASS INDEX: 34.53 KG/M2 | SYSTOLIC BLOOD PRESSURE: 138 MMHG | HEART RATE: 64 BPM | HEIGHT: 65 IN | WEIGHT: 207.25 LBS

## 2018-01-01 VITALS
RESPIRATION RATE: 29 BRPM | HEIGHT: 64 IN | TEMPERATURE: 93 F | OXYGEN SATURATION: 15 % | WEIGHT: 207 LBS | BODY MASS INDEX: 35.34 KG/M2 | SYSTOLIC BLOOD PRESSURE: 82 MMHG | DIASTOLIC BLOOD PRESSURE: 44 MMHG

## 2018-01-01 VITALS
WEIGHT: 210.56 LBS | HEART RATE: 74 BPM | SYSTOLIC BLOOD PRESSURE: 146 MMHG | BODY MASS INDEX: 35.08 KG/M2 | HEIGHT: 65 IN | OXYGEN SATURATION: 96 % | DIASTOLIC BLOOD PRESSURE: 72 MMHG

## 2018-01-01 VITALS — OXYGEN SATURATION: 95 % | SYSTOLIC BLOOD PRESSURE: 140 MMHG | HEART RATE: 63 BPM | DIASTOLIC BLOOD PRESSURE: 60 MMHG

## 2018-01-01 VITALS
DIASTOLIC BLOOD PRESSURE: 72 MMHG | BODY MASS INDEX: 34.34 KG/M2 | HEIGHT: 65 IN | WEIGHT: 206.13 LBS | SYSTOLIC BLOOD PRESSURE: 192 MMHG | HEART RATE: 63 BPM

## 2018-01-01 DIAGNOSIS — Z01.00 DIABETIC EYE EXAM: ICD-10-CM

## 2018-01-01 DIAGNOSIS — G35 MULTIPLE SCLEROSIS: Primary | ICD-10-CM

## 2018-01-01 DIAGNOSIS — K64.8 INTERNAL HEMORRHOID: ICD-10-CM

## 2018-01-01 DIAGNOSIS — F17.200 NICOTINE DEPENDENCE: Primary | ICD-10-CM

## 2018-01-01 DIAGNOSIS — R26.9 GAIT ABNORMALITY: ICD-10-CM

## 2018-01-01 DIAGNOSIS — Z79.4 CONTROLLED TYPE 2 DIABETES MELLITUS WITH HYPERGLYCEMIA, WITH LONG-TERM CURRENT USE OF INSULIN: ICD-10-CM

## 2018-01-01 DIAGNOSIS — I15.2 HYPERTENSION COMPLICATING DIABETES: ICD-10-CM

## 2018-01-01 DIAGNOSIS — A41.9 SEPSIS, DUE TO UNSPECIFIED ORGANISM: ICD-10-CM

## 2018-01-01 DIAGNOSIS — Z12.31 ENCOUNTER FOR SCREENING MAMMOGRAM FOR BREAST CANCER: ICD-10-CM

## 2018-01-01 DIAGNOSIS — R26.89 DECREASED FUNCTIONAL MOBILITY: ICD-10-CM

## 2018-01-01 DIAGNOSIS — R79.89 ELEVATED SERUM CREATININE: ICD-10-CM

## 2018-01-01 DIAGNOSIS — Z79.899 ENCOUNTER FOR LONG-TERM (CURRENT) USE OF HIGH-RISK MEDICATION: ICD-10-CM

## 2018-01-01 DIAGNOSIS — Z71.89 COUNSELING REGARDING GOALS OF CARE: ICD-10-CM

## 2018-01-01 DIAGNOSIS — I10 ESSENTIAL HYPERTENSION: ICD-10-CM

## 2018-01-01 DIAGNOSIS — E11.65 CONTROLLED TYPE 2 DIABETES MELLITUS WITH HYPERGLYCEMIA, WITH LONG-TERM CURRENT USE OF INSULIN: ICD-10-CM

## 2018-01-01 DIAGNOSIS — Z12.31 BREAST CANCER SCREENING BY MAMMOGRAM: ICD-10-CM

## 2018-01-01 DIAGNOSIS — R26.9 NEUROLOGIC GAIT DYSFUNCTION: ICD-10-CM

## 2018-01-01 DIAGNOSIS — R00.0 TACHYCARDIA: ICD-10-CM

## 2018-01-01 DIAGNOSIS — E11.69 HYPERLIPIDEMIA ASSOCIATED WITH TYPE 2 DIABETES MELLITUS: ICD-10-CM

## 2018-01-01 DIAGNOSIS — Z79.4 UNCONTROLLED TYPE 2 DIABETES MELLITUS WITH HYPERGLYCEMIA, WITH LONG-TERM CURRENT USE OF INSULIN: ICD-10-CM

## 2018-01-01 DIAGNOSIS — Z00.6 RESEARCH EXAM: ICD-10-CM

## 2018-01-01 DIAGNOSIS — Z91.81 RISK FOR FALLS: ICD-10-CM

## 2018-01-01 DIAGNOSIS — G35 MULTIPLE SCLEROSIS: ICD-10-CM

## 2018-01-01 DIAGNOSIS — E11.65 UNCONTROLLED TYPE 2 DIABETES MELLITUS WITH HYPERGLYCEMIA, WITH LONG-TERM CURRENT USE OF INSULIN: ICD-10-CM

## 2018-01-01 DIAGNOSIS — Z00.6 RESEARCH EXAM: Primary | ICD-10-CM

## 2018-01-01 DIAGNOSIS — E11.51 DIABETES MELLITUS TYPE 2 WITH PERIPHERAL ARTERY DISEASE: ICD-10-CM

## 2018-01-01 DIAGNOSIS — K76.0 NAFLD (NONALCOHOLIC FATTY LIVER DISEASE): ICD-10-CM

## 2018-01-01 DIAGNOSIS — E55.9 VITAMIN D INSUFFICIENCY: ICD-10-CM

## 2018-01-01 DIAGNOSIS — E78.5 HYPERLIPIDEMIA ASSOCIATED WITH TYPE 2 DIABETES MELLITUS: ICD-10-CM

## 2018-01-01 DIAGNOSIS — J20.9 ACUTE BRONCHITIS, UNSPECIFIED ORGANISM: Primary | ICD-10-CM

## 2018-01-01 DIAGNOSIS — E11.59 HYPERTENSION COMPLICATING DIABETES: ICD-10-CM

## 2018-01-01 DIAGNOSIS — Z51.81 MEDICATION MONITORING ENCOUNTER: ICD-10-CM

## 2018-01-01 DIAGNOSIS — G35 MS (MULTIPLE SCLEROSIS): Primary | ICD-10-CM

## 2018-01-01 DIAGNOSIS — M79.603 ARM PAIN: ICD-10-CM

## 2018-01-01 DIAGNOSIS — Z45.2 ENCOUNTER FOR CENTRAL LINE PLACEMENT: ICD-10-CM

## 2018-01-01 DIAGNOSIS — F17.200 TOBACCO DEPENDENCE: ICD-10-CM

## 2018-01-01 DIAGNOSIS — Z12.31 ENCOUNTER FOR SCREENING MAMMOGRAM FOR BREAST CANCER: Primary | ICD-10-CM

## 2018-01-01 DIAGNOSIS — I15.9 SECONDARY HYPERTENSION: ICD-10-CM

## 2018-01-01 DIAGNOSIS — E11.9 TYPE 2 DIABETES MELLITUS WITHOUT COMPLICATION: ICD-10-CM

## 2018-01-01 DIAGNOSIS — R74.01 TRANSAMINITIS: ICD-10-CM

## 2018-01-01 DIAGNOSIS — E11.9 DIABETIC EYE EXAM: ICD-10-CM

## 2018-01-01 DIAGNOSIS — N17.9 AKI (ACUTE KIDNEY INJURY): ICD-10-CM

## 2018-01-01 DIAGNOSIS — A41.9 SEPSIS: Primary | ICD-10-CM

## 2018-01-01 DIAGNOSIS — L30.9 DERMATITIS DUE TO UNKNOWN CAUSE: Primary | ICD-10-CM

## 2018-01-01 DIAGNOSIS — K64.2 PROLAPSED INTERNAL HEMORRHOIDS, GRADE 3: Primary | ICD-10-CM

## 2018-01-01 DIAGNOSIS — Z29.89 PROPHYLACTIC IMMUNOTHERAPY: ICD-10-CM

## 2018-01-01 DIAGNOSIS — R11.10 VOMITING: ICD-10-CM

## 2018-01-01 DIAGNOSIS — E11.65 CONTROLLED TYPE 2 DIABETES MELLITUS WITH HYPERGLYCEMIA, WITH LONG-TERM CURRENT USE OF INSULIN: Primary | ICD-10-CM

## 2018-01-01 DIAGNOSIS — M72.6 NECROTIZING FASCIITIS: ICD-10-CM

## 2018-01-01 DIAGNOSIS — E11.8 TYPE 2 DIABETES MELLITUS WITH COMPLICATION: ICD-10-CM

## 2018-01-01 DIAGNOSIS — M21.862 HYPEREXTENSION DEFORMITY OF KNEE, LEFT: ICD-10-CM

## 2018-01-01 DIAGNOSIS — R26.9 GAIT DISTURBANCE: ICD-10-CM

## 2018-01-01 DIAGNOSIS — F33.41 MDD (MAJOR DEPRESSIVE DISORDER), RECURRENT, IN PARTIAL REMISSION: ICD-10-CM

## 2018-01-01 DIAGNOSIS — Z79.4 CONTROLLED TYPE 2 DIABETES MELLITUS WITH HYPERGLYCEMIA, WITH LONG-TERM CURRENT USE OF INSULIN: Primary | ICD-10-CM

## 2018-01-01 LAB
25(OH)D3+25(OH)D2 SERPL-MCNC: 72 NG/ML
ABO GROUP BLD: NORMAL
ALBUMIN SERPL BCP-MCNC: 0.9 G/DL
ALBUMIN SERPL BCP-MCNC: 1.2 G/DL
ALBUMIN SERPL BCP-MCNC: 1.2 G/DL
ALBUMIN SERPL BCP-MCNC: 1.8 G/DL
ALBUMIN SERPL BCP-MCNC: 1.8 G/DL
ALBUMIN SERPL BCP-MCNC: 2.3 G/DL
ALBUMIN SERPL BCP-MCNC: 3.5 G/DL
ALBUMIN SERPL BCP-MCNC: 3.6 G/DL
ALBUMIN SERPL BCP-MCNC: 3.9 G/DL
ALLENS TEST: ABNORMAL
ALP SERPL-CCNC: 225 U/L
ALP SERPL-CCNC: 61 U/L
ALP SERPL-CCNC: 66 U/L
ALP SERPL-CCNC: 67 U/L
ALP SERPL-CCNC: 67 U/L
ALP SERPL-CCNC: 69 U/L
ALP SERPL-CCNC: 71 U/L
ALT SERPL W/O P-5'-P-CCNC: 1140 U/L
ALT SERPL W/O P-5'-P-CCNC: 1140 U/L
ALT SERPL W/O P-5'-P-CCNC: 28 U/L
ALT SERPL W/O P-5'-P-CCNC: 38 U/L
ALT SERPL W/O P-5'-P-CCNC: 40 U/L
ALT SERPL W/O P-5'-P-CCNC: 43 U/L
ALT SERPL W/O P-5'-P-CCNC: 6752 U/L
AMPHET+METHAMPHET UR QL: NEGATIVE
ANION GAP SERPL CALC-SCNC: 10 MMOL/L
ANION GAP SERPL CALC-SCNC: 27 MMOL/L
ANION GAP SERPL CALC-SCNC: 27 MMOL/L
ANION GAP SERPL CALC-SCNC: 28 MMOL/L
ANION GAP SERPL CALC-SCNC: 32 MMOL/L
ANION GAP SERPL CALC-SCNC: 32 MMOL/L
ANION GAP SERPL CALC-SCNC: 34 MMOL/L
ANION GAP SERPL CALC-SCNC: 9 MMOL/L
APAP SERPL-MCNC: <3 UG/ML
APTT BLDCRRT: 70.4 SEC
AST SERPL-CCNC: 103 U/L
AST SERPL-CCNC: 1568 U/L
AST SERPL-CCNC: 1568 U/L
AST SERPL-CCNC: 18 U/L
AST SERPL-CCNC: 25 U/L
AST SERPL-CCNC: 28 U/L
AST SERPL-CCNC: ABNORMAL U/L
B-OH-BUTYR BLD STRIP-SCNC: 0.2 MMOL/L
BACTERIA #/AREA URNS HPF: ABNORMAL /HPF
BARBITURATES UR QL SCN>200 NG/ML: NEGATIVE
BASOPHILS # BLD AUTO: 0.01 K/UL
BASOPHILS # BLD AUTO: 0.02 K/UL
BASOPHILS # BLD AUTO: 0.05 K/UL
BASOPHILS # BLD AUTO: 0.06 K/UL
BASOPHILS NFR BLD: 0 %
BASOPHILS NFR BLD: 0.2 %
BASOPHILS NFR BLD: 0.4 %
BASOPHILS NFR BLD: 0.4 %
BASOPHILS NFR BLD: 0.6 %
BASOPHILS NFR BLD: 0.7 %
BASOPHILS NFR BLD: 0.7 %
BASOPHILS NFR BLD: 0.8 %
BENZODIAZ UR QL SCN>200 NG/ML: NEGATIVE
BILIRUB DIRECT SERPL-MCNC: 0.1 MG/DL
BILIRUB SERPL-MCNC: 0.2 MG/DL
BILIRUB SERPL-MCNC: 0.6 MG/DL
BILIRUB SERPL-MCNC: 0.6 MG/DL
BILIRUB SERPL-MCNC: 0.7 MG/DL
BILIRUB SERPL-MCNC: 0.7 MG/DL
BILIRUB UR QL STRIP: NEGATIVE
BLD GP AB SCN CELLS X3 SERPL QL: NORMAL
BLD PROD TYP BPU: NORMAL
BLOOD UNIT EXPIRATION DATE: NORMAL
BLOOD UNIT TYPE CODE: 600
BLOOD UNIT TYPE CODE: 6200
BLOOD UNIT TYPE: NORMAL
BUN SERPL-MCNC: 13 MG/DL
BUN SERPL-MCNC: 13 MG/DL
BUN SERPL-MCNC: 20 MG/DL
BUN SERPL-MCNC: 21 MG/DL
BUN SERPL-MCNC: 27 MG/DL
BUN SERPL-MCNC: 43 MG/DL
BZE UR QL SCN: NEGATIVE
CALCIUM SERPL-MCNC: 10.1 MG/DL
CALCIUM SERPL-MCNC: 5.5 MG/DL
CALCIUM SERPL-MCNC: 5.5 MG/DL
CALCIUM SERPL-MCNC: 5.8 MG/DL
CALCIUM SERPL-MCNC: 8.5 MG/DL
CALCIUM SERPL-MCNC: 8.5 MG/DL
CALCIUM SERPL-MCNC: 9.3 MG/DL
CALCIUM SERPL-MCNC: 9.9 MG/DL
CANNABINOIDS UR QL SCN: NEGATIVE
CHLORIDE SERPL-SCNC: 103 MMOL/L
CHLORIDE SERPL-SCNC: 103 MMOL/L
CHLORIDE SERPL-SCNC: 105 MMOL/L
CHLORIDE SERPL-SCNC: 95 MMOL/L
CHLORIDE SERPL-SCNC: 98 MMOL/L
CHLORIDE SERPL-SCNC: 98 MMOL/L
CHLORIDE SERPL-SCNC: 99 MMOL/L
CHLORIDE SERPL-SCNC: 99 MMOL/L
CK SERPL-CCNC: 58 U/L
CK SERPL-CCNC: ABNORMAL U/L
CLARITY UR: ABNORMAL
CO2 SERPL-SCNC: 26 MMOL/L
CO2 SERPL-SCNC: 28 MMOL/L
CO2 SERPL-SCNC: 5 MMOL/L
CO2 SERPL-SCNC: 6 MMOL/L
CO2 SERPL-SCNC: 6 MMOL/L
CO2 SERPL-SCNC: 7 MMOL/L
CODING SYSTEM: NORMAL
COLOR UR: YELLOW
CREAT SERPL-MCNC: 1 MG/DL
CREAT SERPL-MCNC: 1.2 MG/DL
CREAT SERPL-MCNC: 1.5 MG/DL
CREAT SERPL-MCNC: 1.5 MG/DL
CREAT SERPL-MCNC: 1.9 MG/DL
CREAT SERPL-MCNC: 3.6 MG/DL (ref 0.5–1.4)
CREAT SERPL-MCNC: 3.7 MG/DL
CREAT SERPL-MCNC: 3.8 MG/DL
CREAT SERPL-MCNC: 3.8 MG/DL
CREAT UR-MCNC: 157.8 MG/DL
CREAT UR-MCNC: 157.8 MG/DL
CREAT UR-MCNC: 215.1 MG/DL
CRP SERPL-MCNC: 390.7 MG/L
DELSYS: ABNORMAL
DIFFERENTIAL METHOD: ABNORMAL
DISPENSE STATUS: NORMAL
EOSINOPHIL # BLD AUTO: 0 K/UL
EOSINOPHIL # BLD AUTO: 0.1 K/UL
EOSINOPHIL # BLD AUTO: 0.1 K/UL
EOSINOPHIL # BLD AUTO: 0.2 K/UL
EOSINOPHIL # BLD AUTO: 0.2 K/UL
EOSINOPHIL NFR BLD: 0 %
EOSINOPHIL NFR BLD: 0.9 %
EOSINOPHIL NFR BLD: 0.9 %
EOSINOPHIL NFR BLD: 1 %
EOSINOPHIL NFR BLD: 1.4 %
EOSINOPHIL NFR BLD: 1.8 %
EOSINOPHIL NFR BLD: 2.2 %
EOSINOPHIL NFR BLD: 2.8 %
ERYTHROCYTE [DISTWIDTH] IN BLOOD BY AUTOMATED COUNT: 12 %
ERYTHROCYTE [DISTWIDTH] IN BLOOD BY AUTOMATED COUNT: 12.2 %
ERYTHROCYTE [DISTWIDTH] IN BLOOD BY AUTOMATED COUNT: 12.6 %
ERYTHROCYTE [DISTWIDTH] IN BLOOD BY AUTOMATED COUNT: 12.7 %
ERYTHROCYTE [DISTWIDTH] IN BLOOD BY AUTOMATED COUNT: 12.7 %
ERYTHROCYTE [DISTWIDTH] IN BLOOD BY AUTOMATED COUNT: 13.5 %
ERYTHROCYTE [DISTWIDTH] IN BLOOD BY AUTOMATED COUNT: 13.6 %
ERYTHROCYTE [DISTWIDTH] IN BLOOD BY AUTOMATED COUNT: 13.7 %
ERYTHROCYTE [SEDIMENTATION RATE] IN BLOOD BY WESTERGREN METHOD: 18 MM/H
ERYTHROCYTE [SEDIMENTATION RATE] IN BLOOD BY WESTERGREN METHOD: 28 MM/H
ERYTHROCYTE [SEDIMENTATION RATE] IN BLOOD BY WESTERGREN METHOD: 28 MM/H
ERYTHROCYTE [SEDIMENTATION RATE] IN BLOOD BY WESTERGREN METHOD: 450 MM/H
ERYTHROCYTE [SEDIMENTATION RATE] IN BLOOD BY WESTERGREN METHOD: 95 MM/HR
EST. GFR  (AFRICAN AMERICAN): 14 ML/MIN/1.73 M^2
EST. GFR  (AFRICAN AMERICAN): 14 ML/MIN/1.73 M^2
EST. GFR  (AFRICAN AMERICAN): 15 ML/MIN/1.73 M^2
EST. GFR  (AFRICAN AMERICAN): 33 ML/MIN/1.73 M^2
EST. GFR  (AFRICAN AMERICAN): 44 ML/MIN/1.73 M^2
EST. GFR  (AFRICAN AMERICAN): 44 ML/MIN/1.73 M^2
EST. GFR  (AFRICAN AMERICAN): 57.2 ML/MIN/1.73 M^2
EST. GFR  (AFRICAN AMERICAN): >60 ML/MIN/1.73 M^2
EST. GFR  (NON AFRICAN AMERICAN): 12 ML/MIN/1.73 M^2
EST. GFR  (NON AFRICAN AMERICAN): 12 ML/MIN/1.73 M^2
EST. GFR  (NON AFRICAN AMERICAN): 13 ML/MIN/1.73 M^2
EST. GFR  (NON AFRICAN AMERICAN): 28 ML/MIN/1.73 M^2
EST. GFR  (NON AFRICAN AMERICAN): 38 ML/MIN/1.73 M^2
EST. GFR  (NON AFRICAN AMERICAN): 38 ML/MIN/1.73 M^2
EST. GFR  (NON AFRICAN AMERICAN): 49.6 ML/MIN/1.73 M^2
EST. GFR  (NON AFRICAN AMERICAN): >60 ML/MIN/1.73 M^2
ESTIMATED AVG GLUCOSE: 143 MG/DL
ESTIMATED AVG GLUCOSE: 151 MG/DL
ESTIMATED AVG GLUCOSE: 151 MG/DL
FERRITIN SERPL-MCNC: 70 NG/ML
FIO2: 100
FIO2: 100
FIO2: 80
FIO2: 80
FLUAV AG SPEC QL IA: NEGATIVE
FLUBV AG SPEC QL IA: NEGATIVE
GIANT PLATELETS BLD QL SMEAR: PRESENT
GIANT PLATELETS BLD QL SMEAR: PRESENT
GLUCOSE SERPL-MCNC: 171 MG/DL
GLUCOSE SERPL-MCNC: 171 MG/DL
GLUCOSE SERPL-MCNC: 174 MG/DL
GLUCOSE SERPL-MCNC: 176 MG/DL
GLUCOSE SERPL-MCNC: 195 MG/DL (ref 70–110)
GLUCOSE SERPL-MCNC: 229 MG/DL (ref 70–110)
GLUCOSE SERPL-MCNC: 250 MG/DL
GLUCOSE SERPL-MCNC: 278 MG/DL
GLUCOSE SERPL-MCNC: 278 MG/DL
GLUCOSE SERPL-MCNC: 308 MG/DL
GLUCOSE UR QL STRIP: NEGATIVE
GRAM STN SPEC: NORMAL
GRAM STN SPEC: NORMAL
HAV IGM SERPL QL IA: NEGATIVE
HBA1C MFR BLD HPLC: 6.6 %
HBA1C MFR BLD HPLC: 6.9 %
HBA1C MFR BLD HPLC: 6.9 %
HBV CORE IGM SERPL QL IA: NEGATIVE
HBV SURFACE AG SERPL QL IA: NEGATIVE
HCO3 UR-SCNC: 10.3 MMOL/L (ref 24–28)
HCO3 UR-SCNC: 12.5 MMOL/L (ref 24–28)
HCO3 UR-SCNC: 4.1 MMOL/L (ref 24–28)
HCO3 UR-SCNC: 6 MMOL/L (ref 24–28)
HCO3 UR-SCNC: 6.5 MMOL/L (ref 24–28)
HCO3 UR-SCNC: 7.6 MMOL/L (ref 24–28)
HCO3 UR-SCNC: 9.2 MMOL/L (ref 24–28)
HCT VFR BLD AUTO: 17 %
HCT VFR BLD AUTO: 28.6 %
HCT VFR BLD AUTO: 28.6 %
HCT VFR BLD AUTO: 37.6 %
HCT VFR BLD AUTO: 39.5 %
HCT VFR BLD AUTO: 40.5 %
HCT VFR BLD AUTO: 7.1 %
HCT VFR BLD AUTO: 7.8 %
HCT VFR BLD CALC: 22 %PCV (ref 36–54)
HCT VFR BLD CALC: 26 %PCV (ref 36–54)
HCV AB SERPL QL IA: NEGATIVE
HGB BLD-MCNC: 12.2 G/DL
HGB BLD-MCNC: 13.1 G/DL
HGB BLD-MCNC: 13.2 G/DL
HGB BLD-MCNC: 2.2 G/DL
HGB BLD-MCNC: 2.5 G/DL
HGB BLD-MCNC: 5.2 G/DL
HGB BLD-MCNC: 8 G/DL
HGB BLD-MCNC: 9 G/DL
HGB BLD-MCNC: 9.1 G/DL
HGB BLD-MCNC: 9.1 G/DL
HGB UR QL STRIP: ABNORMAL
HYALINE CASTS #/AREA URNS LPF: 0 /LPF
IMM GRANULOCYTES # BLD AUTO: 0.02 K/UL
IMM GRANULOCYTES # BLD AUTO: 0.03 K/UL
IMM GRANULOCYTES NFR BLD AUTO: 0.3 %
IMM GRANULOCYTES NFR BLD AUTO: 0.3 %
INR PPP: 1.7
INR PPP: 6.9
KETONES UR QL STRIP: ABNORMAL
LACTATE SERPL-SCNC: >12 MMOL/L
LEUKOCYTE ESTERASE UR QL STRIP: ABNORMAL
LIPASE SERPL-CCNC: 51 U/L
LYMPHOCYTES # BLD AUTO: 0.6 K/UL
LYMPHOCYTES # BLD AUTO: 0.8 K/UL
LYMPHOCYTES # BLD AUTO: 0.9 K/UL
LYMPHOCYTES # BLD AUTO: 1 K/UL
LYMPHOCYTES # BLD AUTO: 1 K/UL
LYMPHOCYTES # BLD AUTO: 3.1 K/UL
LYMPHOCYTES # BLD AUTO: 3.1 K/UL
LYMPHOCYTES NFR BLD: 11.5 %
LYMPHOCYTES NFR BLD: 21 %
LYMPHOCYTES NFR BLD: 21 %
LYMPHOCYTES NFR BLD: 27 %
LYMPHOCYTES NFR BLD: 29.9 %
LYMPHOCYTES NFR BLD: 30 %
LYMPHOCYTES NFR BLD: 34.2 %
LYMPHOCYTES NFR BLD: 38.9 %
MAGNESIUM SERPL-MCNC: 2.1 MG/DL
MAGNESIUM SERPL-MCNC: 2.4 MG/DL
MAGNESIUM SERPL-MCNC: 2.8 MG/DL
MCH RBC QN AUTO: 28.6 PG
MCH RBC QN AUTO: 30.1 PG
MCH RBC QN AUTO: 30.7 PG
MCH RBC QN AUTO: 30.7 PG
MCH RBC QN AUTO: 30.8 PG
MCH RBC QN AUTO: 31 PG
MCH RBC QN AUTO: 31.3 PG
MCH RBC QN AUTO: 31.4 PG
MCHC RBC AUTO-ENTMCNC: 30.6 G/DL
MCHC RBC AUTO-ENTMCNC: 31 G/DL
MCHC RBC AUTO-ENTMCNC: 31.8 G/DL
MCHC RBC AUTO-ENTMCNC: 31.8 G/DL
MCHC RBC AUTO-ENTMCNC: 32.1 G/DL
MCHC RBC AUTO-ENTMCNC: 32.4 G/DL
MCHC RBC AUTO-ENTMCNC: 32.6 G/DL
MCHC RBC AUTO-ENTMCNC: 33.2 G/DL
MCV RBC AUTO: 93 FL
MCV RBC AUTO: 94 FL
MCV RBC AUTO: 95 FL
MCV RBC AUTO: 95 FL
MCV RBC AUTO: 97 FL
MCV RBC AUTO: 98 FL
METHADONE UR QL SCN>300 NG/ML: NEGATIVE
MICROSCOPIC COMMENT: ABNORMAL
MIN VOL: 8.4
MODE: ABNORMAL
MONOCYTES # BLD AUTO: 0.1 K/UL
MONOCYTES # BLD AUTO: 0.2 K/UL
MONOCYTES # BLD AUTO: 0.4 K/UL
MONOCYTES # BLD AUTO: 0.7 K/UL
MONOCYTES # BLD AUTO: 0.7 K/UL
MONOCYTES NFR BLD: 2.3 %
MONOCYTES NFR BLD: 4 %
MONOCYTES NFR BLD: 5.6 %
MONOCYTES NFR BLD: 7.4 %
MONOCYTES NFR BLD: 7.6 %
MONOCYTES NFR BLD: 7.7 %
MONOCYTES NFR BLD: 7.7 %
MONOCYTES NFR BLD: 8.6 %
MYELOCYTES NFR BLD MANUAL: 2 %
NABFERON ANTIBODY TEST: NORMAL
NEUTROPHILS # BLD AUTO: 1.7 K/UL
NEUTROPHILS # BLD AUTO: 1.8 K/UL
NEUTROPHILS # BLD AUTO: 3.1 K/UL
NEUTROPHILS # BLD AUTO: 3.1 K/UL
NEUTROPHILS # BLD AUTO: 3.8 K/UL
NEUTROPHILS # BLD AUTO: 3.9 K/UL
NEUTROPHILS # BLD AUTO: 4.9 K/UL
NEUTROPHILS NFR BLD: 48.6 %
NEUTROPHILS NFR BLD: 55.3 %
NEUTROPHILS NFR BLD: 57 %
NEUTROPHILS NFR BLD: 59.1 %
NEUTROPHILS NFR BLD: 59.6 %
NEUTROPHILS NFR BLD: 70 %
NEUTROPHILS NFR BLD: 70 %
NEUTROPHILS NFR BLD: 79.3 %
NEUTS BAND NFR BLD MANUAL: 7 %
NITRITE UR QL STRIP: NEGATIVE
NRBC BLD-RTO: 0 /100 WBC
NRBC BLD-RTO: ABNORMAL /100 WBC
NUM UNITS TRANS FFP: NORMAL
OB PNL GAST: POSITIVE
OPIATES UR QL SCN: NEGATIVE
PCO2 BLDA: 18.6 MMHG (ref 35–45)
PCO2 BLDA: 24.1 MMHG (ref 35–45)
PCO2 BLDA: 38 MMHG (ref 35–45)
PCO2 BLDA: 41.4 MMHG (ref 35–45)
PCO2 BLDA: 45.5 MMHG (ref 35–45)
PCO2 BLDA: 53.2 MMHG (ref 35–45)
PCO2 BLDA: 74.6 MMHG (ref 35–45)
PCP UR QL SCN>25 NG/ML: NEGATIVE
PEEP: 5
PEEP: 5
PEEP: 8
PEEP: 8
PH SMN: 6.7 [PH] (ref 7.35–7.45)
PH SMN: 6.76 [PH] (ref 7.35–7.45)
PH SMN: 6.84 [PH] (ref 7.35–7.45)
PH SMN: 6.96 [PH] (ref 7.35–7.45)
PH SMN: 7 [PH] (ref 7.35–7.45)
PH SMN: 7.01 [PH] (ref 7.35–7.45)
PH SMN: 7.05 [PH] (ref 7.35–7.45)
PH UR STRIP: 5 [PH] (ref 5–8)
PHOSPHATE SERPL-MCNC: 10.5 MG/DL
PHOSPHATE SERPL-MCNC: 11.9 MG/DL
PHOSPHATE SERPL-MCNC: >15 MG/DL
PIP: 27
PLATELET # BLD AUTO: 110 K/UL
PLATELET # BLD AUTO: 110 K/UL
PLATELET # BLD AUTO: 16 K/UL
PLATELET # BLD AUTO: 16 K/UL
PLATELET # BLD AUTO: 172 K/UL
PLATELET # BLD AUTO: 220 K/UL
PLATELET # BLD AUTO: 24 K/UL
PLATELET # BLD AUTO: 281 K/UL
PMV BLD AUTO: 10.1 FL
PMV BLD AUTO: 11.3 FL
PMV BLD AUTO: 11.3 FL
PMV BLD AUTO: 11.6 FL
PMV BLD AUTO: 11.6 FL
PMV BLD AUTO: 11.7 FL
PMV BLD AUTO: 12.1 FL
PMV BLD AUTO: 9.9 FL
PO2 BLDA: 128 MMHG (ref 80–100)
PO2 BLDA: 138 MMHG (ref 80–100)
PO2 BLDA: 235 MMHG (ref 80–100)
PO2 BLDA: 248 MMHG (ref 80–100)
PO2 BLDA: 362 MMHG (ref 80–100)
PO2 BLDA: 45 MMHG (ref 80–100)
PO2 BLDA: 89 MMHG (ref 40–60)
POC BE: -18 MMOL/L
POC BE: -21 MMOL/L
POC BE: -25 MMOL/L
POC BE: -27 MMOL/L
POC BE: -27 MMOL/L
POC BE: -28 MMOL/L
POC BE: -28 MMOL/L
POC IONIZED CALCIUM: 0.9 MMOL/L (ref 1.06–1.42)
POC IONIZED CALCIUM: 1.12 MMOL/L (ref 1.06–1.42)
POC SATURATED O2: 100 % (ref 95–100)
POC SATURATED O2: 100 % (ref 95–100)
POC SATURATED O2: 55 % (ref 95–100)
POC SATURATED O2: 78 % (ref 95–100)
POC SATURATED O2: 96 % (ref 95–100)
POC SATURATED O2: 97 % (ref 95–100)
POC SATURATED O2: 99 % (ref 95–100)
POC TCO2: 11 MMOL/L (ref 23–27)
POC TCO2: 11 MMOL/L (ref 24–29)
POC TCO2: 14 MMOL/L (ref 23–27)
POC TCO2: 7 MMOL/L (ref 23–27)
POC TCO2: 8 MMOL/L (ref 23–27)
POC TCO2: 9 MMOL/L (ref 23–27)
POC TCO2: <5 MMOL/L (ref 23–27)
POCT GLUCOSE: 106 MG/DL (ref 70–110)
POCT GLUCOSE: 258 MG/DL (ref 70–110)
POCT GLUCOSE: 274 MG/DL (ref 70–110)
POCT GLUCOSE: 298 MG/DL (ref 70–110)
POCT GLUCOSE: 301 MG/DL (ref 70–110)
POTASSIUM BLD-SCNC: 4.6 MMOL/L (ref 3.5–5.1)
POTASSIUM BLD-SCNC: 4.9 MMOL/L (ref 3.5–5.1)
POTASSIUM SERPL-SCNC: 3.6 MMOL/L
POTASSIUM SERPL-SCNC: 4.1 MMOL/L
POTASSIUM SERPL-SCNC: 4.2 MMOL/L
POTASSIUM SERPL-SCNC: 5.4 MMOL/L
POTASSIUM SERPL-SCNC: 5.4 MMOL/L
POTASSIUM SERPL-SCNC: 5.5 MMOL/L
POTASSIUM SERPL-SCNC: 7.1 MMOL/L
POTASSIUM SERPL-SCNC: 7.1 MMOL/L
PROT SERPL-MCNC: 2.5 G/DL
PROT SERPL-MCNC: 4.7 G/DL
PROT SERPL-MCNC: 4.7 G/DL
PROT SERPL-MCNC: 6.9 G/DL
PROT SERPL-MCNC: 7.6 G/DL
PROT SERPL-MCNC: 7.7 G/DL
PROT SERPL-MCNC: 8.4 G/DL
PROT UR QL STRIP: ABNORMAL
PROTHROMBIN TIME: 18.4 SEC
PROTHROMBIN TIME: 72.8 SEC
RBC # BLD AUTO: 0.73 M/UL
RBC # BLD AUTO: 0.8 M/UL
RBC # BLD AUTO: 1.82 M/UL
RBC # BLD AUTO: 2.96 M/UL
RBC # BLD AUTO: 2.96 M/UL
RBC # BLD AUTO: 3.94 M/UL
RBC # BLD AUTO: 4.17 M/UL
RBC # BLD AUTO: 4.29 M/UL
RBC #/AREA URNS HPF: 10 /HPF (ref 0–4)
RESEARCH LAB: NORMAL
RH BLD: NORMAL
SAMPLE: ABNORMAL
SITE: ABNORMAL
SODIUM BLD-SCNC: 134 MMOL/L (ref 136–145)
SODIUM BLD-SCNC: 137 MMOL/L (ref 136–145)
SODIUM SERPL-SCNC: 132 MMOL/L
SODIUM SERPL-SCNC: 132 MMOL/L
SODIUM SERPL-SCNC: 136 MMOL/L
SODIUM SERPL-SCNC: 137 MMOL/L
SODIUM SERPL-SCNC: 137 MMOL/L
SODIUM SERPL-SCNC: 138 MMOL/L
SODIUM SERPL-SCNC: 138 MMOL/L
SODIUM SERPL-SCNC: 141 MMOL/L
SODIUM UR-SCNC: 70 MMOL/L
SP GR UR STRIP: >=1.03 (ref 1–1.03)
SP02: 100
SPECIMEN SOURCE: NORMAL
SQUAMOUS #/AREA URNS HPF: 19 /HPF
TOXIC GRANULES BLD QL SMEAR: PRESENT
TOXIC GRANULES BLD QL SMEAR: PRESENT
TOXICOLOGY INFORMATION: NORMAL
TRANS ERYTHROCYTES VOL PATIENT: NORMAL ML
TROPONIN I SERPL DL<=0.01 NG/ML-MCNC: 0.12 NG/ML
TROPONIN I SERPL DL<=0.01 NG/ML-MCNC: 0.55 NG/ML
TROPONIN I SERPL DL<=0.01 NG/ML-MCNC: 0.56 NG/ML
TSH SERPL DL<=0.005 MIU/L-ACNC: 3.11 UIU/ML
URN SPEC COLLECT METH UR: ABNORMAL
UROBILINOGEN UR STRIP-ACNC: NEGATIVE EU/DL
VANCOMYCIN SERPL-MCNC: 14.9 UG/ML
VANCOMYCIN SERPL-MCNC: 64.8 UG/ML
VT: 370
VT: 450
WBC # BLD AUTO: 2.39 K/UL
WBC # BLD AUTO: 2.85 K/UL
WBC # BLD AUTO: 2.98 K/UL
WBC # BLD AUTO: 4.57 K/UL
WBC # BLD AUTO: 4.57 K/UL
WBC # BLD AUTO: 4.85 K/UL
WBC # BLD AUTO: 7.94 K/UL
WBC # BLD AUTO: 8.94 K/UL
WBC #/AREA URNS HPF: 15 /HPF (ref 0–5)
WBC CLUMPS URNS QL MICRO: ABNORMAL

## 2018-01-01 PROCEDURE — 83735 ASSAY OF MAGNESIUM: CPT

## 2018-01-01 PROCEDURE — 3044F HG A1C LEVEL LT 7.0%: CPT | Mod: CPTII,S$GLB,, | Performed by: INTERNAL MEDICINE

## 2018-01-01 PROCEDURE — 77067 SCR MAMMO BI INCL CAD: CPT | Mod: 26,,, | Performed by: RADIOLOGY

## 2018-01-01 PROCEDURE — 93010 ELECTROCARDIOGRAM REPORT: CPT | Mod: ,,, | Performed by: INTERNAL MEDICINE

## 2018-01-01 PROCEDURE — 82550 ASSAY OF CK (CPK): CPT

## 2018-01-01 PROCEDURE — 99215 OFFICE O/P EST HI 40 MIN: CPT | Mod: S$GLB,,, | Performed by: PHYSICIAN ASSISTANT

## 2018-01-01 PROCEDURE — 3008F BODY MASS INDEX DOCD: CPT | Mod: S$GLB,,, | Performed by: INTERNAL MEDICINE

## 2018-01-01 PROCEDURE — 99999 PR PBB SHADOW E&M-EST. PATIENT-LVL IV: CPT | Mod: PBBFAC,,, | Performed by: PHYSICIAN ASSISTANT

## 2018-01-01 PROCEDURE — 99999 PR PBB SHADOW E&M-EST. PATIENT-LVL III: CPT | Mod: PBBFAC,,, | Performed by: PSYCHIATRY & NEUROLOGY

## 2018-01-01 PROCEDURE — 83036 HEMOGLOBIN GLYCOSYLATED A1C: CPT

## 2018-01-01 PROCEDURE — 25000003 PHARM REV CODE 250: Performed by: EMERGENCY MEDICINE

## 2018-01-01 PROCEDURE — 25000003 PHARM REV CODE 250

## 2018-01-01 PROCEDURE — 97140 MANUAL THERAPY 1/> REGIONS: CPT | Mod: PN

## 2018-01-01 PROCEDURE — 3077F SYST BP >= 140 MM HG: CPT | Mod: CPTII,S$GLB,, | Performed by: PSYCHIATRY & NEUROLOGY

## 2018-01-01 PROCEDURE — 77063 BREAST TOMOSYNTHESIS BI: CPT | Mod: 26,,, | Performed by: RADIOLOGY

## 2018-01-01 PROCEDURE — 99999 PR PBB SHADOW E&M-EST. PATIENT-LVL IV: CPT | Mod: PBBFAC,,, | Performed by: INTERNAL MEDICINE

## 2018-01-01 PROCEDURE — 97112 NEUROMUSCULAR REEDUCATION: CPT | Mod: PN

## 2018-01-01 PROCEDURE — S0077 INJECTION, CLINDAMYCIN PHOSP: HCPCS | Performed by: SURGERY

## 2018-01-01 PROCEDURE — 85730 THROMBOPLASTIN TIME PARTIAL: CPT

## 2018-01-01 PROCEDURE — 97110 THERAPEUTIC EXERCISES: CPT | Mod: PN

## 2018-01-01 PROCEDURE — 82962 GLUCOSE BLOOD TEST: CPT

## 2018-01-01 PROCEDURE — 80069 RENAL FUNCTION PANEL: CPT

## 2018-01-01 PROCEDURE — P9021 RED BLOOD CELLS UNIT: HCPCS

## 2018-01-01 PROCEDURE — 87205 SMEAR GRAM STAIN: CPT

## 2018-01-01 PROCEDURE — 83735 ASSAY OF MAGNESIUM: CPT | Mod: 91

## 2018-01-01 PROCEDURE — 94002 VENT MGMT INPAT INIT DAY: CPT

## 2018-01-01 PROCEDURE — 25000003 PHARM REV CODE 250: Performed by: SURGERY

## 2018-01-01 PROCEDURE — 25000003 PHARM REV CODE 250: Performed by: INTERNAL MEDICINE

## 2018-01-01 PROCEDURE — 63600175 PHARM REV CODE 636 W HCPCS: Mod: JG | Performed by: NURSE ANESTHETIST, CERTIFIED REGISTERED

## 2018-01-01 PROCEDURE — 3008F BODY MASS INDEX DOCD: CPT | Mod: CPTII,S$GLB,, | Performed by: PHYSICIAN ASSISTANT

## 2018-01-01 PROCEDURE — 37000009 HC ANESTHESIA EA ADD 15 MINS: Performed by: SURGERY

## 2018-01-01 PROCEDURE — 0K9B00Z DRAINAGE OF LEFT LOWER ARM AND WRIST MUSCLE WITH DRAINAGE DEVICE, OPEN APPROACH: ICD-10-PCS | Performed by: SURGERY

## 2018-01-01 PROCEDURE — 93010 ELECTROCARDIOGRAM REPORT: CPT | Mod: ,,, | Performed by: STUDENT IN AN ORGANIZED HEALTH CARE EDUCATION/TRAINING PROGRAM

## 2018-01-01 PROCEDURE — 99999 PR PBB SHADOW E&M-EST. PATIENT-LVL III: CPT | Mod: PBBFAC,,, | Performed by: INTERNAL MEDICINE

## 2018-01-01 PROCEDURE — 25000003 PHARM REV CODE 250: Performed by: NURSE ANESTHETIST, CERTIFIED REGISTERED

## 2018-01-01 PROCEDURE — 3078F DIAST BP <80 MM HG: CPT | Mod: CPTII,S$GLB,, | Performed by: INTERNAL MEDICINE

## 2018-01-01 PROCEDURE — 3079F DIAST BP 80-89 MM HG: CPT | Mod: CPTII,S$GLB,, | Performed by: INTERNAL MEDICINE

## 2018-01-01 PROCEDURE — 96375 TX/PRO/DX INJ NEW DRUG ADDON: CPT

## 2018-01-01 PROCEDURE — 87147 CULTURE TYPE IMMUNOLOGIC: CPT | Mod: 59

## 2018-01-01 PROCEDURE — 83690 ASSAY OF LIPASE: CPT

## 2018-01-01 PROCEDURE — 80307 DRUG TEST PRSMV CHEM ANLYZR: CPT

## 2018-01-01 PROCEDURE — 0BH17EZ INSERTION OF ENDOTRACHEAL AIRWAY INTO TRACHEA, VIA NATURAL OR ARTIFICIAL OPENING: ICD-10-PCS | Performed by: EMERGENCY MEDICINE

## 2018-01-01 PROCEDURE — 63600175 PHARM REV CODE 636 W HCPCS: Performed by: STUDENT IN AN ORGANIZED HEALTH CARE EDUCATION/TRAINING PROGRAM

## 2018-01-01 PROCEDURE — 72156 MRI NECK SPINE W/O & W/DYE: CPT | Mod: TC

## 2018-01-01 PROCEDURE — 87086 URINE CULTURE/COLONY COUNT: CPT

## 2018-01-01 PROCEDURE — 02HV33Z INSERTION OF INFUSION DEVICE INTO SUPERIOR VENA CAVA, PERCUTANEOUS APPROACH: ICD-10-PCS | Performed by: SURGERY

## 2018-01-01 PROCEDURE — 87400 INFLUENZA A/B EACH AG IA: CPT

## 2018-01-01 PROCEDURE — 3075F SYST BP GE 130 - 139MM HG: CPT | Mod: CPTII,S$GLB,, | Performed by: INTERNAL MEDICINE

## 2018-01-01 PROCEDURE — 36415 COLL VENOUS BLD VENIPUNCTURE: CPT

## 2018-01-01 PROCEDURE — 85007 BL SMEAR W/DIFF WBC COUNT: CPT

## 2018-01-01 PROCEDURE — 36600 WITHDRAWAL OF ARTERIAL BLOOD: CPT

## 2018-01-01 PROCEDURE — 86382 NEUTRALIZATION TEST VIRAL: CPT

## 2018-01-01 PROCEDURE — 36000707: Performed by: SURGERY

## 2018-01-01 PROCEDURE — 82803 BLOOD GASES ANY COMBINATION: CPT

## 2018-01-01 PROCEDURE — 96365 THER/PROPH/DIAG IV INF INIT: CPT

## 2018-01-01 PROCEDURE — 36620 INSERTION CATHETER ARTERY: CPT

## 2018-01-01 PROCEDURE — 36556 INSERT NON-TUNNEL CV CATH: CPT | Mod: 59

## 2018-01-01 PROCEDURE — 84100 ASSAY OF PHOSPHORUS: CPT

## 2018-01-01 PROCEDURE — 85652 RBC SED RATE AUTOMATED: CPT

## 2018-01-01 PROCEDURE — 85025 COMPLETE CBC W/AUTO DIFF WBC: CPT

## 2018-01-01 PROCEDURE — 87075 CULTR BACTERIA EXCEPT BLOOD: CPT

## 2018-01-01 PROCEDURE — 82271 OCCULT BLOOD OTHER SOURCES: CPT

## 2018-01-01 PROCEDURE — 3075F SYST BP GE 130 - 139MM HG: CPT | Mod: CPTII,S$GLB,, | Performed by: PHYSICIAN ASSISTANT

## 2018-01-01 PROCEDURE — 36430 TRANSFUSION BLD/BLD COMPNT: CPT

## 2018-01-01 PROCEDURE — C1729 CATH, DRAINAGE: HCPCS | Performed by: SURGERY

## 2018-01-01 PROCEDURE — 36415 COLL VENOUS BLD VENIPUNCTURE: CPT | Mod: PO

## 2018-01-01 PROCEDURE — 80053 COMPREHEN METABOLIC PANEL: CPT

## 2018-01-01 PROCEDURE — 25000003 PHARM REV CODE 250: Performed by: STUDENT IN AN ORGANIZED HEALTH CARE EDUCATION/TRAINING PROGRAM

## 2018-01-01 PROCEDURE — 84484 ASSAY OF TROPONIN QUANT: CPT

## 2018-01-01 PROCEDURE — 86140 C-REACTIVE PROTEIN: CPT

## 2018-01-01 PROCEDURE — 97116 GAIT TRAINING THERAPY: CPT | Mod: PN

## 2018-01-01 PROCEDURE — P9047 ALBUMIN (HUMAN), 25%, 50ML: HCPCS | Mod: JG | Performed by: STUDENT IN AN ORGANIZED HEALTH CARE EDUCATION/TRAINING PROGRAM

## 2018-01-01 PROCEDURE — 94761 N-INVAS EAR/PLS OXIMETRY MLT: CPT

## 2018-01-01 PROCEDURE — 85025 COMPLETE CBC W/AUTO DIFF WBC: CPT | Mod: 91

## 2018-01-01 PROCEDURE — 93005 ELECTROCARDIOGRAM TRACING: CPT

## 2018-01-01 PROCEDURE — 81000 URINALYSIS NONAUTO W/SCOPE: CPT | Mod: 59

## 2018-01-01 PROCEDURE — 82570 ASSAY OF URINE CREATININE: CPT

## 2018-01-01 PROCEDURE — 70553 MRI BRAIN STEM W/O & W/DYE: CPT | Mod: 26,,, | Performed by: RADIOLOGY

## 2018-01-01 PROCEDURE — 99407 BEHAV CHNG SMOKING > 10 MIN: CPT | Mod: S$GLB,,, | Performed by: INTERNAL MEDICINE

## 2018-01-01 PROCEDURE — 0K9800Z DRAINAGE OF LEFT UPPER ARM MUSCLE WITH DRAINAGE DEVICE, OPEN APPROACH: ICD-10-PCS | Performed by: SURGERY

## 2018-01-01 PROCEDURE — 87077 CULTURE AEROBIC IDENTIFY: CPT

## 2018-01-01 PROCEDURE — 37799 UNLISTED PX VASCULAR SURGERY: CPT

## 2018-01-01 PROCEDURE — 84484 ASSAY OF TROPONIN QUANT: CPT | Mod: 91

## 2018-01-01 PROCEDURE — 36556 INSERT NON-TUNNEL CV CATH: CPT | Mod: 51,RT,, | Performed by: SURGERY

## 2018-01-01 PROCEDURE — 86920 COMPATIBILITY TEST SPIN: CPT

## 2018-01-01 PROCEDURE — 3078F DIAST BP <80 MM HG: CPT | Mod: CPTII,S$GLB,, | Performed by: PHYSICIAN ASSISTANT

## 2018-01-01 PROCEDURE — 99214 OFFICE O/P EST MOD 30 MIN: CPT | Mod: S$GLB,,, | Performed by: INTERNAL MEDICINE

## 2018-01-01 PROCEDURE — 87015 SPECIMEN INFECT AGNT CONCNTJ: CPT

## 2018-01-01 PROCEDURE — 80076 HEPATIC FUNCTION PANEL: CPT

## 2018-01-01 PROCEDURE — 80074 ACUTE HEPATITIS PANEL: CPT

## 2018-01-01 PROCEDURE — 96367 TX/PROPH/DG ADDL SEQ IV INF: CPT

## 2018-01-01 PROCEDURE — 82306 VITAMIN D 25 HYDROXY: CPT

## 2018-01-01 PROCEDURE — 80100008 HC CRRT DAILY MAINTENANCE

## 2018-01-01 PROCEDURE — P9045 ALBUMIN (HUMAN), 5%, 250 ML: HCPCS | Mod: JG | Performed by: NURSE ANESTHETIST, CERTIFIED REGISTERED

## 2018-01-01 PROCEDURE — 63600175 PHARM REV CODE 636 W HCPCS: Mod: JG | Performed by: STUDENT IN AN ORGANIZED HEALTH CARE EDUCATION/TRAINING PROGRAM

## 2018-01-01 PROCEDURE — 87102 FUNGUS ISOLATION CULTURE: CPT

## 2018-01-01 PROCEDURE — 3077F SYST BP >= 140 MM HG: CPT | Mod: CPTII,S$GLB,, | Performed by: INTERNAL MEDICINE

## 2018-01-01 PROCEDURE — 83605 ASSAY OF LACTIC ACID: CPT

## 2018-01-01 PROCEDURE — 63600175 PHARM REV CODE 636 W HCPCS: Performed by: SURGERY

## 2018-01-01 PROCEDURE — 3008F BODY MASS INDEX DOCD: CPT | Mod: CPTII,S$GLB,, | Performed by: PSYCHIATRY & NEUROLOGY

## 2018-01-01 PROCEDURE — 80202 ASSAY OF VANCOMYCIN: CPT

## 2018-01-01 PROCEDURE — 84443 ASSAY THYROID STIM HORMONE: CPT

## 2018-01-01 PROCEDURE — 36000706: Performed by: SURGERY

## 2018-01-01 PROCEDURE — 99215 OFFICE O/P EST HI 40 MIN: CPT | Mod: S$GLB,,, | Performed by: INTERNAL MEDICINE

## 2018-01-01 PROCEDURE — 87116 MYCOBACTERIA CULTURE: CPT

## 2018-01-01 PROCEDURE — 85610 PROTHROMBIN TIME: CPT

## 2018-01-01 PROCEDURE — 85027 COMPLETE CBC AUTOMATED: CPT

## 2018-01-01 PROCEDURE — 99291 CRITICAL CARE FIRST HOUR: CPT | Mod: 25

## 2018-01-01 PROCEDURE — 20000000 HC ICU ROOM

## 2018-01-01 PROCEDURE — 77067 SCR MAMMO BI INCL CAD: CPT | Mod: TC

## 2018-01-01 PROCEDURE — 99215 OFFICE O/P EST HI 40 MIN: CPT | Mod: S$GLB,,, | Performed by: PSYCHIATRY & NEUROLOGY

## 2018-01-01 PROCEDURE — 97162 PT EVAL MOD COMPLEX 30 MIN: CPT | Mod: PN

## 2018-01-01 PROCEDURE — 82565 ASSAY OF CREATININE: CPT

## 2018-01-01 PROCEDURE — S0077 INJECTION, CLINDAMYCIN PHOSP: HCPCS | Performed by: EMERGENCY MEDICINE

## 2018-01-01 PROCEDURE — 63600175 PHARM REV CODE 636 W HCPCS: Performed by: EMERGENCY MEDICINE

## 2018-01-01 PROCEDURE — 3044F HG A1C LEVEL LT 7.0%: CPT | Mod: CPTII,S$GLB,, | Performed by: PHYSICIAN ASSISTANT

## 2018-01-01 PROCEDURE — 90945 DIALYSIS ONE EVALUATION: CPT

## 2018-01-01 PROCEDURE — 87070 CULTURE OTHR SPECIMN AEROBIC: CPT

## 2018-01-01 PROCEDURE — 99900035 HC TECH TIME PER 15 MIN (STAT)

## 2018-01-01 PROCEDURE — 83605 ASSAY OF LACTIC ACID: CPT | Mod: 91

## 2018-01-01 PROCEDURE — 72156 MRI NECK SPINE W/O & W/DYE: CPT | Mod: 26,,, | Performed by: RADIOLOGY

## 2018-01-01 PROCEDURE — 87206 SMEAR FLUORESCENT/ACID STAI: CPT

## 2018-01-01 PROCEDURE — 86850 RBC ANTIBODY SCREEN: CPT

## 2018-01-01 PROCEDURE — A9585 GADOBUTROL INJECTION: HCPCS | Performed by: PHYSICIAN ASSISTANT

## 2018-01-01 PROCEDURE — 84300 ASSAY OF URINE SODIUM: CPT

## 2018-01-01 PROCEDURE — 70553 MRI BRAIN STEM W/O & W/DYE: CPT | Mod: TC

## 2018-01-01 PROCEDURE — 80069 RENAL FUNCTION PANEL: CPT | Mod: 91

## 2018-01-01 PROCEDURE — 23930 I&D UPR A/E DP ABSC/HMTMA: CPT | Mod: LT,,, | Performed by: SURGERY

## 2018-01-01 PROCEDURE — 37000008 HC ANESTHESIA 1ST 15 MINUTES: Performed by: SURGERY

## 2018-01-01 PROCEDURE — 80053 COMPREHEN METABOLIC PANEL: CPT | Mod: 91

## 2018-01-01 PROCEDURE — 27000221 HC OXYGEN, UP TO 24 HOURS

## 2018-01-01 PROCEDURE — 5A1935Z RESPIRATORY VENTILATION, LESS THAN 24 CONSECUTIVE HOURS: ICD-10-PCS | Performed by: EMERGENCY MEDICINE

## 2018-01-01 PROCEDURE — 87040 BLOOD CULTURE FOR BACTERIA: CPT

## 2018-01-01 PROCEDURE — 87186 SC STD MICRODIL/AGAR DIL: CPT | Mod: 59

## 2018-01-01 PROCEDURE — 31500 INSERT EMERGENCY AIRWAY: CPT | Mod: 59

## 2018-01-01 PROCEDURE — 82010 KETONE BODYS QUAN: CPT

## 2018-01-01 PROCEDURE — 86901 BLOOD TYPING SEROLOGIC RH(D): CPT

## 2018-01-01 PROCEDURE — 80329 ANALGESICS NON-OPIOID 1 OR 2: CPT

## 2018-01-01 PROCEDURE — 87088 URINE BACTERIA CULTURE: CPT

## 2018-01-01 PROCEDURE — 82728 ASSAY OF FERRITIN: CPT

## 2018-01-01 PROCEDURE — 3008F BODY MASS INDEX DOCD: CPT | Mod: CPTII,S$GLB,, | Performed by: INTERNAL MEDICINE

## 2018-01-01 PROCEDURE — 3078F DIAST BP <80 MM HG: CPT | Mod: CPTII,S$GLB,, | Performed by: PSYCHIATRY & NEUROLOGY

## 2018-01-01 PROCEDURE — 25500020 PHARM REV CODE 255: Performed by: PHYSICIAN ASSISTANT

## 2018-01-01 PROCEDURE — 99223 1ST HOSP IP/OBS HIGH 75: CPT | Mod: 25,,, | Performed by: SURGERY

## 2018-01-01 RX ORDER — METOPROLOL TARTRATE 100 MG/1
TABLET ORAL
Status: CANCELLED | OUTPATIENT
Start: 2018-01-01

## 2018-01-01 RX ORDER — ALBUMIN HUMAN 250 G/1000ML
25 SOLUTION INTRAVENOUS ONCE
Status: COMPLETED | OUTPATIENT
Start: 2018-01-01 | End: 2018-01-01

## 2018-01-01 RX ORDER — HYDROCODONE BITARTRATE AND ACETAMINOPHEN 500; 5 MG/1; MG/1
TABLET ORAL
Status: DISCONTINUED | OUTPATIENT
Start: 2018-01-01 | End: 2018-01-01

## 2018-01-01 RX ORDER — ALBUMIN HUMAN 50 G/1000ML
SOLUTION INTRAVENOUS CONTINUOUS PRN
Status: DISCONTINUED | OUTPATIENT
Start: 2018-01-01 | End: 2018-01-01

## 2018-01-01 RX ORDER — SODIUM CHLORIDE 9 MG/ML
INJECTION, SOLUTION INTRAVENOUS CONTINUOUS PRN
Status: DISCONTINUED | OUTPATIENT
Start: 2018-01-01 | End: 2018-01-01

## 2018-01-01 RX ORDER — DEXTROSE MONOHYDRATE 100 MG/ML
1000 INJECTION, SOLUTION INTRAVENOUS
Status: DISCONTINUED | OUTPATIENT
Start: 2018-01-01 | End: 2018-01-01 | Stop reason: HOSPADM

## 2018-01-01 RX ORDER — IPRATROPIUM BROMIDE 0.5 MG/2.5ML
0.5 SOLUTION RESPIRATORY (INHALATION)
Status: CANCELLED | OUTPATIENT
Start: 2018-01-01 | End: 2018-01-01

## 2018-01-01 RX ORDER — HEPARIN SODIUM 5000 [USP'U]/ML
5000 INJECTION, SOLUTION INTRAVENOUS; SUBCUTANEOUS EVERY 8 HOURS
Status: DISCONTINUED | OUTPATIENT
Start: 2018-01-01 | End: 2018-01-01 | Stop reason: HOSPADM

## 2018-01-01 RX ORDER — METHYLPREDNISOLONE SOD SUCC 125 MG
125 VIAL (EA) INJECTION
Status: COMPLETED | OUTPATIENT
Start: 2018-01-01 | End: 2018-01-01

## 2018-01-01 RX ORDER — VANCOMYCIN HCL IN 5 % DEXTROSE 1G/250ML
1000 PLASTIC BAG, INJECTION (ML) INTRAVENOUS
Status: DISCONTINUED | OUTPATIENT
Start: 2018-11-08 | End: 2018-01-01 | Stop reason: HOSPADM

## 2018-01-01 RX ORDER — FLUDROCORTISONE ACETATE 0.1 MG/1
100 TABLET ORAL DAILY
Status: DISCONTINUED | OUTPATIENT
Start: 2018-01-01 | End: 2018-01-01 | Stop reason: HOSPADM

## 2018-01-01 RX ORDER — HYDROCHLOROTHIAZIDE 25 MG/1
25 TABLET ORAL DAILY
Qty: 90 TABLET | Refills: 1 | Status: SHIPPED | OUTPATIENT
Start: 2018-01-01 | End: 2018-01-01 | Stop reason: SDUPTHER

## 2018-01-01 RX ORDER — HYDROCHLOROTHIAZIDE 25 MG/1
25 TABLET ORAL DAILY
Qty: 90 TABLET | Refills: 1 | Status: SHIPPED | OUTPATIENT
Start: 2018-01-01 | End: 2019-07-23

## 2018-01-01 RX ORDER — ALBUTEROL SULFATE 1.25 MG/3ML
1.25 SOLUTION RESPIRATORY (INHALATION) EVERY 6 HOURS PRN
Qty: 1 BOX | Refills: 0 | Status: SHIPPED | OUTPATIENT
Start: 2018-01-01 | End: 2019-03-21

## 2018-01-01 RX ORDER — INSULIN ASPART 100 [IU]/ML
INJECTION, SOLUTION INTRAVENOUS; SUBCUTANEOUS
Qty: 18 ML | Refills: 3 | Status: SHIPPED | OUTPATIENT
Start: 2018-01-01

## 2018-01-01 RX ORDER — MAGNESIUM SULFATE HEPTAHYDRATE 40 MG/ML
2 INJECTION, SOLUTION INTRAVENOUS
Status: DISCONTINUED | OUTPATIENT
Start: 2018-01-01 | End: 2018-01-01 | Stop reason: HOSPADM

## 2018-01-01 RX ORDER — GADOBUTROL 604.72 MG/ML
10 INJECTION INTRAVENOUS
Status: COMPLETED | OUTPATIENT
Start: 2018-01-01 | End: 2018-01-01

## 2018-01-01 RX ORDER — VANCOMYCIN HCL IN 5 % DEXTROSE 1G/250ML
1000 PLASTIC BAG, INJECTION (ML) INTRAVENOUS
Status: DISCONTINUED | OUTPATIENT
Start: 2018-01-01 | End: 2018-01-01

## 2018-01-01 RX ORDER — ONDANSETRON 2 MG/ML
INJECTION INTRAMUSCULAR; INTRAVENOUS
Status: DISCONTINUED | OUTPATIENT
Start: 2018-01-01 | End: 2018-01-01

## 2018-01-01 RX ORDER — EPINEPHRINE 0.1 MG/ML
1 INJECTION INTRAVENOUS
Status: DISCONTINUED | OUTPATIENT
Start: 2018-01-01 | End: 2018-01-01

## 2018-01-01 RX ORDER — METOPROLOL TARTRATE 100 MG/1
100 TABLET ORAL 2 TIMES DAILY
Qty: 180 TABLET | Refills: 3 | Status: SHIPPED | OUTPATIENT
Start: 2018-01-01 | End: 2019-05-24

## 2018-01-01 RX ORDER — SODIUM BICARBONATE 1 MEQ/ML
SYRINGE (ML) INTRAVENOUS
Status: COMPLETED
Start: 2018-01-01 | End: 2018-01-01

## 2018-01-01 RX ORDER — ATORVASTATIN CALCIUM 20 MG/1
20 TABLET, FILM COATED ORAL NIGHTLY
Qty: 90 TABLET | Refills: 2 | Status: SHIPPED | OUTPATIENT
Start: 2018-01-01 | End: 2018-01-01 | Stop reason: SDUPTHER

## 2018-01-01 RX ORDER — INTERFERON BETA-1A 30MCG/.5ML
KIT INTRAMUSCULAR
Qty: 1 KIT | Refills: 0 | Status: SHIPPED | OUTPATIENT
Start: 2018-01-01 | End: 2018-01-01 | Stop reason: SDUPTHER

## 2018-01-01 RX ORDER — SODIUM BICARBONATE 1 MEQ/ML
50 SYRINGE (ML) INTRAVENOUS
Status: COMPLETED | OUTPATIENT
Start: 2018-01-01 | End: 2018-01-01

## 2018-01-01 RX ORDER — INDOMETHACIN 25 MG/1
50 CAPSULE ORAL ONCE
Status: COMPLETED | OUTPATIENT
Start: 2018-01-01 | End: 2018-01-01

## 2018-01-01 RX ORDER — INDOMETHACIN 25 MG/1
150 CAPSULE ORAL ONCE
Status: COMPLETED | OUTPATIENT
Start: 2018-01-01 | End: 2018-01-01

## 2018-01-01 RX ORDER — EPINEPHRINE 0.1 MG/ML
1 INJECTION INTRAVENOUS
Status: COMPLETED | OUTPATIENT
Start: 2018-01-01 | End: 2018-01-01

## 2018-01-01 RX ORDER — GLUCAGON 1 MG
1 KIT INJECTION
Status: DISCONTINUED | OUTPATIENT
Start: 2018-01-01 | End: 2018-01-01

## 2018-01-01 RX ORDER — INSULIN GLARGINE 100 [IU]/ML
40 INJECTION, SOLUTION SUBCUTANEOUS NIGHTLY
Qty: 36 ML | Refills: 1 | Status: SHIPPED | OUTPATIENT
Start: 2018-01-01 | End: 2018-01-01 | Stop reason: SDUPTHER

## 2018-01-01 RX ORDER — IPRATROPIUM BROMIDE AND ALBUTEROL SULFATE 2.5; .5 MG/3ML; MG/3ML
3 SOLUTION RESPIRATORY (INHALATION)
Status: SHIPPED | OUTPATIENT
Start: 2018-01-01

## 2018-01-01 RX ORDER — SODIUM BICARBONATE 42 MG/ML
100 INJECTION, SOLUTION INTRAVENOUS ONCE
Status: DISCONTINUED | OUTPATIENT
Start: 2018-01-01 | End: 2018-01-01

## 2018-01-01 RX ORDER — NOREPINEPHRINE BITARTRATE 1 MG/ML
INJECTION, SOLUTION INTRAVENOUS
Status: DISPENSED
Start: 2018-01-01 | End: 2018-01-01

## 2018-01-01 RX ORDER — VANCOMYCIN HCL IN 5 % DEXTROSE 1G/250ML
1000 PLASTIC BAG, INJECTION (ML) INTRAVENOUS
Status: DISCONTINUED | OUTPATIENT
Start: 2018-11-08 | End: 2018-01-01

## 2018-01-01 RX ORDER — METFORMIN HYDROCHLORIDE 1000 MG/1
TABLET ORAL
Qty: 180 TABLET | Refills: 1 | Status: SHIPPED | OUTPATIENT
Start: 2018-01-01

## 2018-01-01 RX ORDER — INSULIN GLARGINE 100 [IU]/ML
40 INJECTION, SOLUTION SUBCUTANEOUS NIGHTLY
Qty: 36 SYRINGE | Refills: 1 | Status: SHIPPED | OUTPATIENT
Start: 2018-01-01 | End: 2018-01-01 | Stop reason: SDUPTHER

## 2018-01-01 RX ORDER — ROCURONIUM BROMIDE 10 MG/ML
INJECTION, SOLUTION INTRAVENOUS
Status: COMPLETED
Start: 2018-01-01 | End: 2018-01-01

## 2018-01-01 RX ORDER — HYDROCODONE BITARTRATE AND ACETAMINOPHEN 500; 5 MG/1; MG/1
TABLET ORAL
Status: DISCONTINUED | OUTPATIENT
Start: 2018-01-01 | End: 2018-01-01 | Stop reason: HOSPADM

## 2018-01-01 RX ORDER — INSULIN GLARGINE 100 [IU]/ML
40 INJECTION, SOLUTION SUBCUTANEOUS NIGHTLY
Qty: 36 ML | Refills: 3 | Status: SHIPPED | OUTPATIENT
Start: 2018-01-01 | End: 2019-09-07

## 2018-01-01 RX ORDER — GLUCAGON 1 MG
1 KIT INJECTION
Status: DISCONTINUED | OUTPATIENT
Start: 2018-01-01 | End: 2018-01-01 | Stop reason: HOSPADM

## 2018-01-01 RX ORDER — CLINDAMYCIN PHOSPHATE 900 MG/50ML
900 INJECTION, SOLUTION INTRAVENOUS
Status: COMPLETED | OUTPATIENT
Start: 2018-01-01 | End: 2018-01-01

## 2018-01-01 RX ORDER — METOPROLOL TARTRATE 100 MG/1
100 TABLET ORAL 2 TIMES DAILY
Qty: 180 TABLET | Refills: 1 | Status: SHIPPED | OUTPATIENT
Start: 2018-01-01 | End: 2018-01-01 | Stop reason: SDUPTHER

## 2018-01-01 RX ORDER — INDOMETHACIN 25 MG/1
CAPSULE ORAL
Status: DISCONTINUED
Start: 2018-01-01 | End: 2018-01-01 | Stop reason: WASHOUT

## 2018-01-01 RX ORDER — TRIAMCINOLONE ACETONIDE 1 MG/G
CREAM TOPICAL 2 TIMES DAILY
Qty: 80 G | Refills: 0 | Status: SHIPPED | OUTPATIENT
Start: 2018-01-01 | End: 2018-01-01

## 2018-01-01 RX ORDER — INSULIN ASPART 100 [IU]/ML
1-10 INJECTION, SOLUTION INTRAVENOUS; SUBCUTANEOUS EVERY 6 HOURS PRN
Status: DISCONTINUED | OUTPATIENT
Start: 2018-01-01 | End: 2018-01-01 | Stop reason: HOSPADM

## 2018-01-01 RX ORDER — INDOMETHACIN 25 MG/1
100 CAPSULE ORAL ONCE
Status: DISCONTINUED | OUTPATIENT
Start: 2018-01-01 | End: 2018-01-01

## 2018-01-01 RX ORDER — NOREPINEPHRINE BITARTRATE/D5W 16MG/250ML
0.05 PLASTIC BAG, INJECTION (ML) INTRAVENOUS CONTINUOUS
Status: DISCONTINUED | OUTPATIENT
Start: 2018-01-01 | End: 2018-01-01

## 2018-01-01 RX ORDER — VANCOMYCIN HCL IN 5 % DEXTROSE 1G/250ML
1000 PLASTIC BAG, INJECTION (ML) INTRAVENOUS
Status: DISCONTINUED | OUTPATIENT
Start: 2018-01-01 | End: 2018-01-01 | Stop reason: DRUGHIGH

## 2018-01-01 RX ORDER — LISINOPRIL 40 MG/1
40 TABLET ORAL DAILY
Qty: 90 TABLET | Refills: 1 | Status: SHIPPED | OUTPATIENT
Start: 2018-01-01

## 2018-01-01 RX ORDER — CHLORHEXIDINE GLUCONATE ORAL RINSE 1.2 MG/ML
15 SOLUTION DENTAL 2 TIMES DAILY
Status: DISCONTINUED | OUTPATIENT
Start: 2018-01-01 | End: 2018-01-01 | Stop reason: HOSPADM

## 2018-01-01 RX ORDER — INSULIN ASPART 100 [IU]/ML
1-10 INJECTION, SOLUTION INTRAVENOUS; SUBCUTANEOUS EVERY 6 HOURS PRN
Status: DISCONTINUED | OUTPATIENT
Start: 2018-01-01 | End: 2018-01-01

## 2018-01-01 RX ORDER — METOPROLOL TARTRATE 50 MG/1
TABLET ORAL
Qty: 180 TABLET | Refills: 1 | Status: SHIPPED | OUTPATIENT
Start: 2018-01-01 | End: 2018-01-01 | Stop reason: SDUPTHER

## 2018-01-01 RX ORDER — SODIUM CHLORIDE 0.9 % (FLUSH) 0.9 %
5 SYRINGE (ML) INJECTION
Status: DISCONTINUED | OUTPATIENT
Start: 2018-01-01 | End: 2018-01-01 | Stop reason: HOSPADM

## 2018-01-01 RX ORDER — HYDROCODONE BITARTRATE AND ACETAMINOPHEN 500; 5 MG/1; MG/1
TABLET ORAL CONTINUOUS
Status: DISCONTINUED | OUTPATIENT
Start: 2018-01-01 | End: 2018-01-01 | Stop reason: HOSPADM

## 2018-01-01 RX ORDER — VENLAFAXINE HYDROCHLORIDE 150 MG/1
150 CAPSULE, EXTENDED RELEASE ORAL DAILY
Qty: 90 CAPSULE | Refills: 1 | Status: SHIPPED | OUTPATIENT
Start: 2018-01-01

## 2018-01-01 RX ORDER — PANTOPRAZOLE SODIUM 40 MG/10ML
40 INJECTION, POWDER, LYOPHILIZED, FOR SOLUTION INTRAVENOUS DAILY
Status: DISCONTINUED | OUTPATIENT
Start: 2018-01-01 | End: 2018-01-01 | Stop reason: HOSPADM

## 2018-01-01 RX ORDER — ATORVASTATIN CALCIUM 20 MG/1
20 TABLET, FILM COATED ORAL NIGHTLY
Qty: 90 TABLET | Refills: 2 | Status: SHIPPED | OUTPATIENT
Start: 2018-01-01 | End: 2019-04-05

## 2018-01-01 RX ORDER — CLINDAMYCIN PHOSPHATE 900 MG/50ML
900 INJECTION, SOLUTION INTRAVENOUS
Status: DISCONTINUED | OUTPATIENT
Start: 2018-01-01 | End: 2018-01-01 | Stop reason: HOSPADM

## 2018-01-01 RX ORDER — SODIUM BICARBONATE 42 MG/ML
INJECTION, SOLUTION INTRAVENOUS
Status: DISCONTINUED | OUTPATIENT
Start: 2018-01-01 | End: 2018-01-01

## 2018-01-01 RX ORDER — ETOMIDATE 2 MG/ML
INJECTION INTRAVENOUS
Status: COMPLETED
Start: 2018-01-01 | End: 2018-01-01

## 2018-01-01 RX ORDER — SODIUM BICARBONATE 1 MEQ/ML
SYRINGE (ML) INTRAVENOUS
Status: DISPENSED
Start: 2018-01-01 | End: 2018-01-01

## 2018-01-01 RX ORDER — DEXMEDETOMIDINE HYDROCHLORIDE 4 UG/ML
0.2 INJECTION, SOLUTION INTRAVENOUS CONTINUOUS
Status: DISCONTINUED | OUTPATIENT
Start: 2018-01-01 | End: 2018-01-01 | Stop reason: HOSPADM

## 2018-01-01 RX ADMIN — NOREPINEPHRINE BITARTRATE 2.15 MCG/KG/MIN: 1 INJECTION INTRAVENOUS at 11:11

## 2018-01-01 RX ADMIN — NOREPINEPHRINE BITARTRATE 3 MCG/KG/MIN: 1 INJECTION INTRAVENOUS at 05:11

## 2018-01-01 RX ADMIN — Medication 1.4 MCG/KG/MIN: at 09:11

## 2018-01-01 RX ADMIN — SODIUM BICARBONATE 50 MEQ: 84 INJECTION, SOLUTION INTRAVENOUS at 01:11

## 2018-01-01 RX ADMIN — Medication 0.05 MCG/KG/MIN: at 01:11

## 2018-01-01 RX ADMIN — SODIUM CHLORIDE 500 ML: 0.9 INJECTION, SOLUTION INTRAVENOUS at 09:11

## 2018-01-01 RX ADMIN — ALBUMIN (HUMAN): 2.5 SOLUTION INTRAVENOUS at 02:11

## 2018-01-01 RX ADMIN — CLINDAMYCIN IN 5 PERCENT DEXTROSE 900 MG: 18 INJECTION, SOLUTION INTRAVENOUS at 07:11

## 2018-01-01 RX ADMIN — SODIUM CHLORIDE 2817 ML: 0.9 INJECTION, SOLUTION INTRAVENOUS at 10:11

## 2018-01-01 RX ADMIN — CHLORHEXIDINE GLUCONATE 15 ML: 1.2 RINSE ORAL at 09:11

## 2018-01-01 RX ADMIN — ETOMIDATE 20 MG: 40 INJECTION, SOLUTION INTRAVENOUS at 01:11

## 2018-01-01 RX ADMIN — SODIUM BICARBONATE: 84 INJECTION, SOLUTION INTRAVENOUS at 06:11

## 2018-01-01 RX ADMIN — EPINEPHRINE 1 MCG/KG/MIN: 1 INJECTION PARENTERAL at 12:11

## 2018-01-01 RX ADMIN — EPINEPHRINE 0.1 MG: 0.1 INJECTION INTRACARDIAC; INTRAVENOUS at 01:11

## 2018-01-01 RX ADMIN — Medication 1.05 MCG/KG/MIN: at 09:11

## 2018-01-01 RX ADMIN — SODIUM CHLORIDE: 0.9 INJECTION, SOLUTION INTRAVENOUS at 02:11

## 2018-01-01 RX ADMIN — SODIUM BICARBONATE 50 MEQ: 84 INJECTION, SOLUTION INTRAVENOUS at 02:11

## 2018-01-01 RX ADMIN — EPINEPHRINE 1.78 MCG/KG/MIN: 1 INJECTION PARENTERAL at 04:11

## 2018-01-01 RX ADMIN — VASOPRESSIN 0.04 UNITS/MIN: 20 INJECTION INTRAVENOUS at 06:11

## 2018-01-01 RX ADMIN — VANCOMYCIN HYDROCHLORIDE 2500 MG: 1 INJECTION, POWDER, LYOPHILIZED, FOR SOLUTION INTRAVENOUS at 12:11

## 2018-01-01 RX ADMIN — SODIUM BICARBONATE 50 MEQ: 42 INJECTION, SOLUTION INTRAVENOUS at 02:11

## 2018-01-01 RX ADMIN — ALBUMIN HUMAN 25 G: 0.25 SOLUTION INTRAVENOUS at 02:11

## 2018-01-01 RX ADMIN — PIPERACILLIN AND TAZOBACTAM 4.5 G: 4; .5 INJECTION, POWDER, LYOPHILIZED, FOR SOLUTION INTRAVENOUS; PARENTERAL at 09:11

## 2018-01-01 RX ADMIN — EPINEPHRINE 1.24 MCG/KG/MIN: 1 INJECTION PARENTERAL at 02:11

## 2018-01-01 RX ADMIN — HYDROCORTISONE SODIUM SUCCINATE 100 MG: 100 INJECTION, POWDER, FOR SOLUTION INTRAMUSCULAR; INTRAVENOUS at 09:11

## 2018-01-01 RX ADMIN — SODIUM CHLORIDE 3 UNITS/HR: 9 INJECTION, SOLUTION INTRAVENOUS at 03:11

## 2018-01-01 RX ADMIN — SODIUM CHLORIDE 500 ML: 0.9 INJECTION, SOLUTION INTRAVENOUS at 12:11

## 2018-01-01 RX ADMIN — SODIUM BICARBONATE 150 MEQ: 84 INJECTION, SOLUTION INTRAVENOUS at 06:11

## 2018-01-01 RX ADMIN — CALCIUM CHLORIDE 1 G: 100 INJECTION, SOLUTION INTRAVENOUS at 02:11

## 2018-01-01 RX ADMIN — EPINEPHRINE 1.52 MCG/KG/MIN: 1 INJECTION PARENTERAL at 03:11

## 2018-01-01 RX ADMIN — EPINEPHRINE 0.1 MG: 0.1 INJECTION, SOLUTION ENDOTRACHEAL; INTRACARDIAC; INTRAVENOUS at 02:11

## 2018-01-01 RX ADMIN — PIPERACILLIN AND TAZOBACTAM 4.5 G: 4; .5 INJECTION, POWDER, LYOPHILIZED, FOR SOLUTION INTRAVENOUS; PARENTERAL at 10:11

## 2018-01-01 RX ADMIN — NOREPINEPHRINE BITARTRATE 3 MCG/KG/MIN: 1 INJECTION INTRAVENOUS at 03:11

## 2018-01-01 RX ADMIN — HYDROCORTISONE SODIUM SUCCINATE 100 MG: 100 INJECTION, POWDER, FOR SOLUTION INTRAMUSCULAR; INTRAVENOUS at 05:11

## 2018-01-01 RX ADMIN — CLINDAMYCIN IN 5 PERCENT DEXTROSE 900 MG: 18 INJECTION, SOLUTION INTRAVENOUS at 02:11

## 2018-01-01 RX ADMIN — VASOPRESSIN 0.04 UNITS/MIN: 20 INJECTION INTRAVENOUS at 12:11

## 2018-01-01 RX ADMIN — SODIUM BICARBONATE: 84 INJECTION, SOLUTION INTRAVENOUS at 12:11

## 2018-01-01 RX ADMIN — CLINDAMYCIN IN 5 PERCENT DEXTROSE 900 MG: 18 INJECTION, SOLUTION INTRAVENOUS at 11:11

## 2018-01-01 RX ADMIN — GADOBUTROL 10 ML: 604.72 INJECTION INTRAVENOUS at 06:04

## 2018-01-01 RX ADMIN — SODIUM CHLORIDE 500 ML: 0.9 INJECTION, SOLUTION INTRAVENOUS at 01:11

## 2018-01-01 RX ADMIN — EPINEPHRINE 0.02 MCG/KG/MIN: 1 INJECTION INTRAMUSCULAR; INTRAVENOUS; SUBCUTANEOUS at 11:11

## 2018-01-01 RX ADMIN — CALCIUM GLUCONATE 1000 MG: 94 INJECTION, SOLUTION INTRAVENOUS at 04:11

## 2018-01-01 RX ADMIN — SODIUM BICARBONATE 50 MEQ: 84 INJECTION, SOLUTION INTRAVENOUS at 05:11

## 2018-01-01 RX ADMIN — ROCURONIUM BROMIDE 50 MG: 10 INJECTION, SOLUTION INTRAVENOUS at 01:11

## 2018-01-01 RX ADMIN — EPINEPHRINE 1.98 MCG/KG/MIN: 1 INJECTION PARENTERAL at 05:11

## 2018-01-01 RX ADMIN — INSULIN ASPART 3 UNITS: 100 INJECTION, SOLUTION INTRAVENOUS; SUBCUTANEOUS at 12:11

## 2018-01-01 RX ADMIN — HEPARIN SODIUM 5000 UNITS: 5000 INJECTION, SOLUTION INTRAVENOUS; SUBCUTANEOUS at 09:11

## 2018-01-01 RX ADMIN — EPINEPHRINE 0.01 MG: 0.1 INJECTION, SOLUTION ENDOTRACHEAL; INTRACARDIAC; INTRAVENOUS at 02:11

## 2018-01-01 RX ADMIN — NOREPINEPHRINE BITARTRATE 3 MCG/KG/MIN: 1 INJECTION INTRAVENOUS at 01:11

## 2018-01-01 RX ADMIN — SODIUM BICARBONATE 50 MEQ: 84 INJECTION, SOLUTION INTRAVENOUS at 03:11

## 2018-01-01 RX ADMIN — SODIUM CHLORIDE 500 ML: 0.9 INJECTION, SOLUTION INTRAVENOUS at 02:11

## 2018-01-01 RX ADMIN — METHYLPREDNISOLONE SODIUM SUCCINATE 125 MG: 125 INJECTION, POWDER, FOR SOLUTION INTRAMUSCULAR; INTRAVENOUS at 09:11

## 2018-02-20 PROBLEM — E11.51 DIABETES MELLITUS TYPE 2 WITH PERIPHERAL ARTERY DISEASE: Status: ACTIVE | Noted: 2018-01-01

## 2018-02-20 NOTE — PROGRESS NOTES
Portions of this note are generated with voice recognition software. Typographical errors may exist.     SUBJECTIVE:    This is a/an 58 y.o. female here for primary care visit for  Chief Complaint   Patient presents with    Diabetes     follow up      Patient brings detailed blood sugar numbers today.  She is complying with Lantus 40 units.  Taking this in the evening time.  Patient is struggling to use the correction scale.  She has been giving herself NovoLog as instructed but has not been using correction scale for additional units of insulin.  Patient states that since reducing the dose of Lantus early evening hypoglycemia and has not recurred.    Patient denies any claudication symptoms.  Complying with atorvastatin and aspirin therapy.    Patient denies any problems with persistent paresthesias or pain in the bilateral lower extremities.  She doesn't have any problems with yeast infections for athlete's foot.    Patient continues to have dietary indiscretions related to impulsive eating and stress eating.    The patient is also more likely to smoke cigarettes when stressed.  Patient does have nicotine patches but has not pursued using them.  After further counseling the patient is willing to start nicotine patches to reduce daily cigarette consumption.  She has not ready to quit out right.      Medications Reviewed and Updated    Past medical, family, and social histories were reviewed and updated.    Review of Systems negative unless otherwise noted in history of present illness-  ROS    General ROS: negative  Psychological ROS: negative  Endocrine ROS: Negative  Allergy and Immunology ROS: negative  Cardiovascular ROS: negative  Genito-Urinary ROS: negative  Musculoskeletal ROS: negative  Neurological ROS: negative  Dermatological ROS: negative        Allergic:  Review of patient's allergies indicates:  No Known Allergies    OBJECTIVE:  BP: (!) 146/72 Pulse: 74    Wt Readings from Last 3 Encounters:    02/20/18 95.5 kg (210 lb 8.6 oz)   12/08/17 93.4 kg (206 lb)   11/27/17 93.6 kg (206 lb 3.9 oz)    Body mass index is 35.04 kg/m².  Previous Blood Pressure Readings :   BP Readings from Last 3 Encounters:   02/20/18 (!) 146/72   11/20/17 138/70   09/20/17 138/78       Physical Exam    GEN: No apparent distress  HEENT: sclera non-icteric, conjunctiva clear  CV: no peripheral edema  PULM: breathing non-labored  ABD: Obese, protuberant abdomen.  PSYCH: appropriate affect  MSK: able to rise from chair without assistance  SKIN: normal skin turgor        Neuropathy Screen   Left: Filament test present 9 sites  Right:   Filament test present 9 sites    Dermatologic Skin  - cracking: No  - thickened nails  No  - blistering  No    Infection:   - fungal infection between toes: No    Ulceration:  No    Musculoskeletal Deformity  - claw toes No  - prominent metatarsal heads  No  - charcot joint  No  - muscle wasting (guttering between metatarsals) No    Left DPP Pulse Diminished: Yes   Right DPP Pulse Diminished:  Yes      Pertinent Labs Reviewed       ASSESSMENT/PLAN:    Controlled type 2 diabetes mellitus with hyperglycemia, with long-term current use of insulin  -     Hemoglobin A1c; Future; Expected date: 02/20/2018  -     insulin aspart U-100 (NOVOLOG FLEXPEN U-100 INSULIN) 100 unit/mL InPn pen; Units with meals: 8 breakfast / 6 lunch / 10 dinner + correction scale  Dispense: 18 mL; Refill: 3    Hypertension complicating diabetes  -     metoprolol tartrate (LOPRESSOR) 100 MG tablet; Take 1 tablet (100 mg total) by mouth 2 (two) times daily.  Dispense: 180 tablet; Refill: 1    Tobacco dependence.Condition not optimally controlled. Detailed counseling on self care measures. Plan to monitor clinically in addition to plan below.   - start NRT     Diabetes mellitus type 2 with peripheral artery disease.Condition stable.  Counseling on self-care measures. Plan to monitor clinically. Continue current medical plan.          Future Appointments  Date Time Provider Department Center   3/2/2018 8:30 AM [facility]   3/21/2018 8:00 AM Chase Nelson MD [facility]       [facility]  2/20/2018  8:15 AM

## 2018-03-21 PROBLEM — K76.0 NAFLD (NONALCOHOLIC FATTY LIVER DISEASE): Status: ACTIVE | Noted: 2018-01-01

## 2018-03-21 NOTE — PROGRESS NOTES
Portions of this note are generated with voice recognition software. Typographical errors may exist.     SUBJECTIVE:    This is a/an 58 y.o. female here for primary care visit for  Chief Complaint   Patient presents with    Hypertension     follow up      Patient states that over the last 2 days she has been having cough and cold symptoms.  Cough has been severe at times.  Sick contacts include grandchildren with similar symptoms.  Denies ALLERGIC rhinitis or sinusitis symptoms.  No ongoing constitutional symptoms.  No recent antibiotic therapies.  Smoking has been reduced during this episode of acute illness.  Self-care measures have been limited.  Patient has a nebulizer machine but no medications.  Does not have a diagnosis of COPD.    Patient has not started using nicotine patches.  Continues to smoke heavily at times.  Psychosocial stress contributes to compulsive smoking.  After discussion today the patient is motivated again to begin using nicotine patches.    Patient did comply with change in blood pressure medication dosage.  Tolerating the change in medication.  Voicing no ongoing side effects.  States that home blood pressure was evaluated sporadically but that new numbers did show improvement.  She did not come prepared with blood pressure monitor today.        Medications Reviewed and Updated    Past medical, family, and social histories were reviewed and updated.    Review of Systems negative unless otherwise noted in history of present illness-  ROS    General ROS: negative  Psychological ROS: negative  ENT ROS: negative  Endocrine ROS: Negative  Allergy and Immunology ROS: negative  Pulmonary ROS: Negative  Genito-Urinary ROS: negative  Musculoskeletal ROS: negative      Allergic:  Review of patient's allergies indicates:  No Known Allergies    OBJECTIVE:  BP: 134/72 Pulse: 76    Wt Readings from Last 3 Encounters:   03/21/18 94.5 kg (208 lb 5.4 oz)   02/20/18 95.5 kg (210 lb 8.6 oz)   12/08/17 93.4  kg (206 lb)    Body mass index is 34.67 kg/m².  Previous Blood Pressure Readings :   BP Readings from Last 3 Encounters:   03/21/18 134/72   02/20/18 (!) 146/72   11/20/17 138/70       Physical Exam    GEN: No apparent distress  HEENT: sclera non-icteric, conjunctiva clear  CV: no peripheral edema  PULM: breathing non-labored.  Mild and expiratory wheezing bilaterally.  ABD: Obese, protuberant abdomen.  PSYCH: appropriate affect  MSK: able to rise from chair without assistance  SKIN: normal skin turgor    Pertinent Labs Reviewed       ASSESSMENT/PLAN:    Acute bronchitis, unspecified organism  -     albuterol-ipratropium 2.5mg-0.5mg/3mL nebulizer solution 3 mL; Take 3 mLs by nebulization one time.  -     albuterol (ACCUNEB) 1.25 mg/3 mL Nebu; Take 3 mLs (1.25 mg total) by nebulization every 6 (six) hours as needed (cough or chest tightness). Rescue  Dispense: 1 Box; Refill: 0    Tobacco dependence    Hypertension complicating diabetes    Controlled type 2 diabetes mellitus with hyperglycemia, with long-term current use of insulin  -     Comprehensive metabolic panel; Future; Expected date: 03/21/2018  -     Hemoglobin A1c; Future; Expected date: 03/21/2018    Medication monitoring encounter  -     CK; Future; Expected date: 03/21/2018    NAFLD (nonalcoholic fatty liver disease)  -     CBC auto differential; Future; Expected date: 03/21/2018  -     Ferritin; Future; Expected date: 03/21/2018    Other orders  -     Cancel: ipratropium 0.02 % nebulizer solution 0.5 mg; Take 2.5 mLs (0.5 mg total) by nebulization one time.          Future Appointments  Date Time Provider Department [place]   5/21/2018 8:45 AM [facility]   5/24/2018 8:00 AM Chase Nelson MD \[facility]\"" [facility] Nelson  3/21/2018  8:37 AM

## 2018-03-21 NOTE — PATIENT INSTRUCTIONS
Recommendations for today    Coughing symptoms today are likely due to viral respiratory illness causing bronchitis.  Antibiotics are not needed.  4 coughing symptoms we recommend starting with over-the-counter medication as this has the fewest side effects.  Coricidin brand cough and cold medicine should be adequate.    For additional relief of chest tightness and coughing symptoms he continues breathing treatment Atrovent.  Symptoms should get better over the next 4-5 days.  If symptoms do not improve contact the clinic for additional recommendations.    We recommend that you start nicotine patches to reduce smoking during this time of illness.  When smoking and is important to reduce smoking so that extra nicotine does not cause elevated blood pressure.  Compulsive smoking at the same time as wearing a nicotine patch can result in side effects from excessive nicotine.  If this happens stop using the nicotine patch and contact the clinic for additional recommendations.  Despite reducing smoking while wearing the patch, some people may still experience high blood pressure.  If this happens, contact the clinic so that we can offer an appointment to adjust blood pressure medication.

## 2018-04-03 NOTE — PROGRESS NOTES
Subjective:       Patient ID: Serenity Powell is a 58 y.o. female who presents today for a routine clinic visit for MS.      MS HPI:  · DMT: Avonex  · Side effects from DMT? No  · Taking vitamin D3 as recommended? Yes - 5,000IU/d   · Patient has been under a lot of stress with daughter and family(4 kids) moving in  · She states she has had a round of L knee injections around the holiday time Nov/Dec 2017 and they helped for about a month-she will return in the summer to try again.    SOCIAL HISTORY  Social History   Substance Use Topics    Smoking status: Current Every Day Smoker     Packs/day: 0.75     Years: 30.00    Smokeless tobacco: Never Used    Alcohol use No     Living arrangements - the patient has daughter and 3 kids move in .  Employment [employer]--full time    MS ROS:    · Fatigue: Yes - tries to rest  · Sleep Disturbance:waking every couple of hours --mind racing  · Bladder Dysfunction: No  · Bowel Dysfunction: No  · Spasticity: Yes - mild; stretches;   · Visual Symptoms: No  · Cognitive: No  · Mood Disorder: Yes - comes and goes; with increased stress feels symptoms much more  · Gait Disturbance: Yes - wears left AFO; stable; walk worsens with stress  · Falls:no falls since last visit  · Hand Dysfunction: No  · Pain: No  · Sexual Dysfunction: Not Assessed  · Skin Breakdown: No  · Tremors: No  · Dysphagia:  No  · Dysarthria:  No  · Heat sensitivity:  Yes - mild  · Any un-met adaptive needs? No  · Copay Assist?  Yes -  $0   · Clinical Trial? Yes-EBV Study      Objective:        1. 25 foot timed walk:7.6s with LAFO with circumduction and L knee hyperextension; 7.0s last visit with same  Timed 25 Foot Walk: 9/2/2016 3/2/2017   Did patient wear an AFO? Yes Yes   Was assistive device used? No No   Time for 25 Foot Walk (seconds) 6.5 6.9   Time for 25 Foot Walk (seconds) 6.7 6.9       Neurologic Exam     Mental Status   Oriented to person, place, and time.   Follows 3 step commands.   Speech: speech  is normal   Level of consciousness: alert  Normal comprehension.     Cranial Nerves     CN II   Visual acuity: (20/20 OU  corrected with Snellen hand held chart at 6 ft)    CN III, IV, VI   Pupils are equal, round, and reactive to light.  Extraocular motions are normal.     CN V   Facial sensation intact.     CN VII   Facial expression full, symmetric.     CN VIII   Hearing: intact (finger rub)    CN IX, X   Palate: symmetric    CN XI   CN XI normal.     CN XII   Tongue deviation: none    Motor Exam   Muscle bulk: normal  Right arm tone: normal  Left arm tone: normal  Right leg tone: increased  Left leg tone: increased    Strength   Right deltoid: 5/5  Left deltoid: 5/5  Right triceps: 5/5  Left triceps: 4/5  Right wrist extension: 5/5  Left wrist extension: 5/5  Right interossei: 5/5  Left interossei: 5/5  Right iliopsoas: 5/5  Left iliopsoas: 0/5  Right hamstrin/5  Left hamstrin/5  Right anterior tibial: 5/5  Left anterior tibial: 0/5  Right peroneal: 5/5  Left peroneal: 1/5    Sensory Exam   Right arm light touch: long standing numbness 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  Left arm light touch: normal  Right leg light touch: normal  Left leg light touch: normal  Right arm vibration: decreased from fingers  Left arm vibration: decreased from fingers  Right leg vibration: decreased from toes  Left leg vibration: decreased from toes    Gait, Coordination, and Reflexes     Gait  Gait: circumduction (L knee hyperextension; L foot drop)    Coordination   Finger to nose coordination: abnormal (impaired on L)  Tandem walking coordination: abnormal    Tremor   Resting tremor: absent  Action tremor: absent    Reflexes   Right brachioradialis: 2+  Left brachioradialis: 2+  Right biceps: 2+  Left biceps: 2+  Right triceps: 2+  Left triceps: 2+  Right patellar: 2+  Left patellar: 2+  Right achilles: 2+  Left achilles: 2+  Right ankle clonus: absent  Left ankle clonus: absent  Right pendular knee jerk: absent  Left pendular  knee jerk: absentNormal RSM             Imaging:     Results for orders placed during the hospital encounter of 03/01/15   MRI Brain W WO Contrast    Impression In this patient with clinical history of multiple sclerosis, there is a stable mild to moderate degree of demyelinating plaque burden with no new lesions and no enhancing lesions to suggest active demyelination.  ______________________________________     Electronically signed by resident: Liam Stafford  Date:     03/03/15  Time:    10:31          As the supervising and teaching physician, I personally reviewed the images and resident's interpretation and I agree with the findings.          Electronically signed by: ALDO JONES MD  Date:     03/03/15  Time:    12:53      Results for orders placed during the hospital encounter of 03/01/15   MRI Cervical Spine W WO Cont    Impression Subtle area of increased signal along the peripheral aspect of the cord at the C7 level may represent a small area of remote demyelination versus artifact.  There no other lesions and no enhancing lesions to suggest active demyelination.      ______________________________________     Electronically signed by resident: Liam Stafford  Date:     03/03/15  Time:    10:34          As the supervising and teaching physician, I personally reviewed the images and resident's interpretation and I agree with the findings.          Electronically signed by: ALDO JONES MD  Date:     03/03/15  Time:    12:54      Results for orders placed in visit on 07/29/11   MRI Thoracic Spine W WO Cont         Labs:     Lab Results   Component Value Date    DYSVYGPX96HR 59 03/02/2017    [id number] 57 03/02/2016    [id number] 44 02/26/2015     No results found for: JCVINDEX, JCVANTIBODY  No results found for: DM2RQURZ, ABSOLUTECD3, WW1HMYXZ, ABSOLUTECD8, DW9EAFOP, ABSOLUTECD4, LABCD48  Lab Results   Component Value Date    WBC 8.94 04/03/2018    RBC 4.17 04/03/2018    HGB 13.1 04/03/2018    HCT 39.5  04/03/2018    MCV 95 04/03/2018    MCH 31.4 (H) 04/03/2018    MCHC 33.2 04/03/2018    RDW 12.0 04/03/2018     04/03/2018    MPV 11.6 04/03/2018    GRAN 4.9 04/03/2018    GRAN 55.3 04/03/2018    LYMPH 3.1 04/03/2018    LYMPH 34.2 04/03/2018    MONO 0.7 04/03/2018    MONO 7.4 04/03/2018    EOS 0.2 04/03/2018    BASO 0.05 04/03/2018    EOSINOPHIL 2.2 04/03/2018    BASOPHIL 0.6 04/03/2018     Sodium   Date Value Ref Range Status   08/05/2017 140 136 - 145 mmol/L Final     Potassium   Date Value Ref Range Status   08/05/2017 4.1 3.5 - 5.1 mmol/L Final     Chloride   Date Value Ref Range Status   08/05/2017 103 95 - 110 mmol/L Final     CO2   Date Value Ref Range Status   08/05/2017 26 23 - 29 mmol/L Final     Glucose   Date Value Ref Range Status   08/05/2017 143 (H) 70 - 110 mg/dL Final     BUN, Bld   Date Value Ref Range Status   08/05/2017 17 6 - 20 mg/dL Final     Creatinine   Date Value Ref Range Status   08/05/2017 0.9 0.5 - 1.4 mg/dL Final     Calcium   Date Value Ref Range Status   08/05/2017 9.8 8.7 - 10.5 mg/dL Final     Total Protein   Date Value Ref Range Status   10/18/2017 7.6 6.0 - 8.4 g/dL Final     Albumin   Date Value Ref Range Status   10/18/2017 3.3 (L) 3.5 - 5.2 g/dL Final     Total Bilirubin   Date Value Ref Range Status   10/18/2017 0.2 0.1 - 1.0 mg/dL Final     Comment:     For infants and newborns, interpretation of results should be based  on gestational age, weight and in agreement with clinical  observations.  Premature Infant recommended reference ranges:  Up to 24 hours.............<8.0 mg/dL  Up to 48 hours............<12.0 mg/dL  3-5 days..................<15.0 mg/dL  6-29 days.................<15.0 mg/dL       Alkaline Phosphatase   Date Value Ref Range Status   10/18/2017 71 55 - 135 U/L Final     AST   Date Value Ref Range Status   10/18/2017 19 10 - 40 U/L Final     ALT   Date Value Ref Range Status   10/18/2017 32 10 - 44 U/L Final     Anion Gap   Date Value Ref Range Status    08/05/2017 11 8 - 16 mmol/L Final     eGFR if    Date Value Ref Range Status   08/05/2017 >60.0 >60 mL/min/1.73 m^2 Final     eGFR if non    Date Value Ref Range Status   08/05/2017 >60.0 >60 mL/min/1.73 m^2 Final     Comment:     Calculation used to obtain the estimated glomerular filtration  rate (eGFR) is the CKD-EPI equation. Since race is unknown   in our information system, the eGFR values for   -American and Non--American patients are given   for each creatinine result.         Diagnosis/Assessment/Plan:    1. Multiple Sclerosis  · Assessment: Patient presents for follow up appt. Her timed walk is slower, but the remainder of exam is stable. Will get MRI's(last done 2015) and check NAB's. Will increase follow up to [id number]. Recommended PT but patient deferred for now with increased family demands. We discussed increased stressors will affect her MS symptoms so management of stressors is important--she acknowledged  · Imaging: Brain and C-spine MRI ordered today  · Disease Modifying Therapies: Will continue Avonex for now, check safety labs and NABS    2. MS Symptom Assessment / Management  · Gait Disturbance: Recommended PT, she deferred for now with family stressors  · Clinical Trials: patient has agreed to participate in EBV study with serum only. Met with DEEPTI Olivia today for sample collection/consent    Over 50% of this 40 minute visit was spent in direct face to face counseling of the patient about MS, DMT considerations, and MS symptom management.     Follow-up in about 3 months (around 7/3/2018) for follow up with Dr. Richardson. Will convey results of lab/MRI's via phone/portal-may return to clinic sooner if needed.   Patient agreed to POC today.    Attending, Dr. Richardson, was available during today's encounter. Any change to plan along with cosign to appear in the EMR.     JUWAN Martínez-C  [facility] Center      Multiple sclerosis  -     CBC auto  differential; Future; Expected date: 04/03/2018  -     Hepatic function panel; Future  -     Vitamin D; Future  -     MRI Cervical Spine W WO Cont; Future; Expected date: 04/03/2018  -     MRI Brain W WO Contrast; Future; Expected date: 04/03/2018  -     Nabferon Antibody; Future; Expected date: 04/03/2018    Counseling regarding goals of care    Encounter for long-term (current) use of high-risk medication  -     CBC auto differential; Future; Expected date: 04/03/2018  -     Hepatic function panel; Future  -     Nabferon Antibody; Future; Expected date: 04/03/2018    Vitamin D insufficiency  -     Vitamin D; Future    Neurologic gait dysfunction    Hyperextension deformity of knee, left    Controlled type 2 diabetes mellitus with hyperglycemia, with long-term current use of insulin    Hypertension complicating diabetes    Tobacco dependence

## 2018-04-03 NOTE — TELEPHONE ENCOUNTER
Control of Marko-Barr Virus in Multiple Sclerosis  PI: Era Richardson MD  Sub-I: Gretchen Geller PA-C    IRB #: [id number]  IRB approval date: 11/9/2017    Serenity Powell was approached about study today in clinic after routine visit with Gretchen Geller PA-C.  She wishes to participate.       Informed Consent Process:  Present for discussion: Subject  Is LAR Consenting for Subject: no     Prior to the Informed Consent (IC) being signed, or any protocol required testing, procedure, or intervention being performed, the following was done or discussed:  · Purpose of the Study, Qualifications to Participate: yes  · Study Design, Schedule and Procedures: yes  · Risks, Benefits, Alternative Treatments, Compensation and Costs: yes  · Confidentiality and HIPAA Authorization for Release of Medical Records for the research trial/subject's right/study related injury: yes  · Study related contact information: yes  · Voluntary Participation and Withdrawal from the research trial at any time: yes  · Optional samples/procedures (if applicable): yes  · Patient has been offered the opportunity to ask questions regarding the study and all questions were answered satisfactorily: yes  · Patient verbalizes understanding of the study/procedures and agrees to participate: yes  · CRC and PI contact information given to patient: yes  · Signed copy given to patient: yes  · Copy in patient's chart and original uploaded to Epic: yes     Person Obtaining Consent: DEEPTI Hernandez    Inclusion / Exclusion:     MS Participants (Arm A) Control Participants (Arm B)   Inclusion Criteria · Age 18 - 60  · Gender: male or female  · Meet the 2010 Yen Criteria for diagnosis of MS · Age 18 - 60  · Gender: male or female  · Scheduled lumbar puncture   Exclusion Criteria · Current or recent (within 2 weeks) use of oral antiplatelet or anticoagulant agents  · Pregnancy  · History of cognitive dysfunction from MS that might impair capacity to  consent  · Diagnosis of dementia, delirium or confusional state  · Suspected alcohol or drug toxicity acutely  · Suspected increased intracranial pressure  · HIV infection · Diagnosis of MS  · Current or recent (within 2 weeks) use of oral antiplatelet or anticoagulant agents  · Pregnancy  · Diagnosis of dementia, delirium or confusional state  · Suspected alcohol or drug toxicity acutely  · Suspected increased intracranial pressure  · HIV infection     The subject meets all of the inclusion criteria and none of the exclusion criteria for the MS participant group.    Verified by PI, Dr. Richardson, on 3 APR 2018    For MS participants only:  Date of MS diagnosis: 1991    Current DMT: Avonex    Study Procedures:   Blood Draw: yes  Performed by: [facility]  Date: 3APR2018  Amount collected: 15mL    Lumbar Puncture: no  Performed by: n/a  Date: n/a  Amount collected: n/a    Collected sample(s) were immediately processed and frozen per protocol.  The samples will remain frozen in liquid nitrogen until shipment on dry ice to lab collaborators at [facility] in [facility].    Compensation:  The subject was issued a [employer] and will receive $10 for her donation of blood.    Serenity Powell was thanked for her participation and reminded to call CRC should she have any questions.

## 2018-05-10 NOTE — PROGRESS NOTES
Spoke with patient today in regard to smoking cessation progress for 3/6 month follow up, she states not tobacco free. Patient states it is not a good time in her life to focus on quitting. Informed patient of benefit period, a future follow up for 1 year, and contact information. Will complete 3/6 month follow up on smart form for Quit attempt #1.

## 2018-05-24 NOTE — PROGRESS NOTES
Portions of this note are generated with voice recognition software. Typographical errors may exist.     SUBJECTIVE:    This is a/an 58 y.o. female here for primary care visit for  Chief Complaint   Patient presents with    Hemorrhoids     Patient states that she has been having significant symptoms in the anogenital region for several weeks.  She has been having blood when wiping after bowel movements and has been noting an area of soft tissue swelling on a persistent basis in the anal region.    The patient states that there is and proctodynia him with bowel movements.  States that the amount of blood is persistent but she does not describe an episode of hematochezia.  No associated abdominal pain. Patient is only taking aspirin sporadically.  Not miss using NSAID medications based on today's discussion.    Today she declines anal /rectal examination but would like to receive colorectal consultation.    Patient states that she has taken metoprolol but she is uncertain if the pharmacy has given her the incorrect dosage.  The pharmacy recently requested the old mg amount of metoprolol 50 mg.  Patient is not checking blood pressure at home consistently.  She is willing to resume home blood pressure monitoring.    Patient states that she continues to prioritize the care of her daughter and grandchildren and is struggling with self-care behaviors specifically with regard to starting nicotine replacement therapy.  She says that she is willing to start because she has nicotine patches at home.  She understands the proper use of nicotine patches.     Patient is complying with diabetic medications and states that she has not had any hypoglycemic episodes.    Medications Reviewed and Updated    Past medical, family, and social histories were reviewed and updated.    Review of Systems negative unless otherwise noted in history of present illness-  ROS    General ROS: negative  Psychological ROS: negative  ENT ROS:  negative  Endocrine ROS: Negative  Allergy and Immunology ROS: negative  Cardiovascular ROS: negative  Pulmonary ROS: Negative  Gastrointestinal ROS: negative  Genito-Urinary ROS: negative  Musculoskeletal ROS: negative      Allergic:  Review of patient's allergies indicates:  No Known Allergies    OBJECTIVE:  BP: (!) 140/60 Pulse: 63    Wt Readings from Last 3 Encounters:   04/03/18 94.1 kg (207 lb 8 oz)   03/21/18 94.5 kg (208 lb 5.4 oz)   02/20/18 95.5 kg (210 lb 8.6 oz)    There is no height or weight on file to calculate BMI.  Previous Blood Pressure Readings :   BP Readings from Last 3 Encounters:   05/24/18 (!) 140/60   04/03/18 139/60   03/21/18 134/72       Physical Exam    GEN: No apparent distress  HEENT: sclera non-icteric, conjunctiva clear  CV: no peripheral edema regular rate and rhythm.  PULM: breathing non-labored  ABD: Obese, protuberant abdomen.  PSYCH: appropriate affect  MSK: able to rise from chair without assistance  SKIN: normal skin turgor    Pertinent Labs Reviewed       ASSESSMENT/PLAN:    Prolapsed internal hemorrhoids, grade 3.Condition not optimally controlled. Detailed counseling on self care measures. Plan to monitor clinically in addition to plan below and on After Visit Summary.   -     Ambulatory Referral to Colorectal Surgery    Controlled type 2 diabetes mellitus with hyperglycemia, with long-term current use of insulin.Condition stable.  Counseling on self-care measures. Plan to monitor clinically. Continue current medical plan.   -     Hemoglobin A1c; Standing  -     Comprehensive metabolic panel; Standing  -     Microalbumin/creatinine urine ratio; Standing    Breast cancer screening by mammogram  -     Mammo Digital Screening Bilat with CAD; Standing    Hypertension complicating diabetes.Condition not optimally controlled. Detailed counseling on self care measures. Plan to monitor clinically in addition to plan below and on After Visit Summary.   -     metoprolol tartrate  (LOPRESSOR) 100 MG tablet; Take 1 tablet (100 mg total) by mouth 2 (two) times daily.  Dispense: 180 tablet; Refill: 3          Future Appointments  Date Time Provider Department [place]   6/14/2018 3:40 PM Deanne Hinojosa NP [facility]   7/11/2018 8:20 AM Era Richardson MD [facility] [place]   8/24/2018 7:00 AM [facility], [facility] [place]   8/24/2018 7:15 AM LAB, [facility] [place]   8/28/2018 8:00 AM Chase Nelson MD [facility]       Chase Nelson  5/24/2018  10:03 AM

## 2018-05-24 NOTE — PATIENT INSTRUCTIONS
Recommendations for today    We highly recommend that you start nicotine replacement therapy with nicotine patches.  When using nicotine patches this should cut down on your urge to smoke.  If compulsive smoking occurs and you start to notice side effects of excess nicotine we recommend stopping nicotine patches.    Please verify that dosage of metoprolol at home is 100 mg and not the old strength 50 mg

## 2018-07-11 NOTE — Clinical Note
K, please FAX U-step order to appropriate vendor Payal/tito Barba tell us who is the appropriate vendor :-)

## 2018-07-11 NOTE — PROGRESS NOTES
Subjective:       Patient ID: Serenity Powell is a 58 y.o. female who presents today for a routine clinic visit for MS.      MS HPI:  · DMT: Avonex  · Side effects from DMT? No  · Taking vitamin D3 as recommended? Yes -  5,000 IU/d   · Overall, feels walking is a bit worse; more fatigued; and worsening right hand numbness; some tinging in hand that comes and goes; not worse at night; no pain;   · Not exercising;   · Smokes 3/4 ppd;     SOCIAL HISTORY  Social History   Substance Use Topics    Smoking status: Current Every Day Smoker     Packs/day: 0.75     Years: 30.00    Smokeless tobacco: Never Used    Alcohol use No     Living arrangements - the patient lives with her daughter and her children  Employment: works full time for insurance company;    MS ROS:  · Fatigue: Yes -   · Sleep Disturbance: Yes - wakes up often;    · Bladder Dysfunction: No  · Bowel Dysfunction: No  · Spasticity: No  · Visual Symptoms: No  · Cognitive: No  · Mood Disorder: Yes - on Effexor; not depressed, but stressed due to situation with her daughter;   · Gait Disturbance: Yes - a little worse; had knee injections last fall which have worn off  · Falls: Yes - one at work; lost balance; no serious injury  · Hand Dysfunction: No  · Pain: Yes - left knee; left sciatica;   · Sexual Dysfunction: Not Assessed  · Skin Breakdown: No  · Tremors: No  · Dysphagia:  No  · Dysarthria:  No  · Heat sensitivity:  Yes - mild  · Any un-met adaptive needs? No; has a shower chair; has a cane;   · Copay Assist?  Yes - $0      Objective:      25 foot timed walk: 7.4 with cane; full left circumduction and hyperextension of knee; very unsteady;   Neurologic Exam   MS: intact  CN: no MAHENDRA, no dysarthria   MOTOR:  LLE 0/5 HF and DF, 3/5 KF          Imaging:     Results for orders placed during the hospital encounter of 04/24/18   MRI Brain W WO Contrast    Impression Examination mildly degraded by patient motion artifact.    Numerous foci of abnormal T2/FLAIR signal  hyperintensity throughout the supratentorial and infratentorial white matter compatible the reported history of multiple sclerosis.  No definite new or enhancing lesions to indicate ongoing or active demyelination.    Question new small lesion in the spinal cord at the C5-6 level.  This is identified only on a single pulse sequence, may be artifactual in nature related to patient motion.  No compelling evidence of enhancement to suggest active demyelination.    Electronically signed by resident: Tom Maria  Date:    04/25/2018  Time:    08:21    Electronically signed by: Sean Schroeder MD  Date:    04/25/2018  Time:    17:23     Results for orders placed during the hospital encounter of 04/24/18   MRI Cervical Spine W WO Cont    Impression Examination mildly degraded by patient motion artifact.    Numerous foci of abnormal T2/FLAIR signal hyperintensity throughout the supratentorial and infratentorial white matter compatible the reported history of multiple sclerosis.  No definite new or enhancing lesions to indicate ongoing or active demyelination.    Question new small lesion in the spinal cord at the C5-6 level.  This is identified only on a single pulse sequence, may be artifactual in nature related to patient motion.  No compelling evidence of enhancement to suggest active demyelination.    Electronically signed by resident: Tom Maria  Date:    04/25/2018  Time:    08:21    Electronically signed by: Sean Schroeder MD  Date:    04/25/2018  Time:    17:23     Results for orders placed in visit on 07/29/11   MRI Thoracic Spine W WO Cont       Labs:     Lab Results   Component Value Date    OANOMRCY12CB 72 04/03/2018    FTNEPIRO28UI 59 03/02/2017    UGQTHQAG36LF 57 03/02/2016     No results found for: JCVINDEX, JCVANTIBODY  No results found for: MY5GXSZK, ABSOLUTECD3, NK4JPIBD, ABSOLUTECD8, NJ5NJKSL, ABSOLUTECD4, LABCD48  Lab Results   Component Value Date    WBC 7.94 05/21/2018    RBC 3.94 (L) 05/21/2018    HGB  12.2 05/21/2018    HCT 37.6 05/21/2018    MCV 95 05/21/2018    MCH 31.0 05/21/2018    MCHC 32.4 05/21/2018    RDW 12.2 05/21/2018     05/21/2018    MPV 12.1 05/21/2018    GRAN 3.9 05/21/2018    GRAN 48.6 05/21/2018    LYMPH 3.1 05/21/2018    LYMPH 38.9 05/21/2018    MONO 0.7 05/21/2018    MONO 8.6 05/21/2018    EOS 0.2 05/21/2018    BASO 0.06 05/21/2018    EOSINOPHIL 2.8 05/21/2018    BASOPHIL 0.8 05/21/2018     Sodium   Date Value Ref Range Status   05/21/2018 141 136 - 145 mmol/L Final     Potassium   Date Value Ref Range Status   05/21/2018 4.1 3.5 - 5.1 mmol/L Final     Chloride   Date Value Ref Range Status   05/21/2018 103 95 - 110 mmol/L Final     CO2   Date Value Ref Range Status   05/21/2018 28 23 - 29 mmol/L Final     Glucose   Date Value Ref Range Status   05/21/2018 174 (H) 70 - 110 mg/dL Final     BUN, Bld   Date Value Ref Range Status   05/21/2018 21 (H) 6 - 20 mg/dL Final     Creatinine   Date Value Ref Range Status   05/21/2018 1.0 0.5 - 1.4 mg/dL Final     Calcium   Date Value Ref Range Status   05/21/2018 9.9 8.7 - 10.5 mg/dL Final     Total Protein   Date Value Ref Range Status   05/21/2018 7.6 6.0 - 8.4 g/dL Final     Albumin   Date Value Ref Range Status   05/21/2018 3.5 3.5 - 5.2 g/dL Final     Total Bilirubin   Date Value Ref Range Status   05/21/2018 0.2 0.1 - 1.0 mg/dL Final     Comment:     For infants and newborns, interpretation of results should be based  on gestational age, weight and in agreement with clinical  observations.  Premature Infant recommended reference ranges:  Up to 24 hours.............<8.0 mg/dL  Up to 48 hours............<12.0 mg/dL  3-5 days..................<15.0 mg/dL  6-29 days.................<15.0 mg/dL       Alkaline Phosphatase   Date Value Ref Range Status   05/21/2018 66 55 - 135 U/L Final     AST   Date Value Ref Range Status   05/21/2018 28 10 - 40 U/L Final     ALT   Date Value Ref Range Status   05/21/2018 43 10 - 44 U/L Final     Anion Gap   Date  Value Ref Range Status   05/21/2018 10 8 - 16 mmol/L Final     eGFR if    Date Value Ref Range Status   05/21/2018 >60.0 >60 mL/min/1.73 m^2 Final     eGFR if non    Date Value Ref Range Status   05/21/2018 >60.0 >60 mL/min/1.73 m^2 Final     Comment:     Calculation used to obtain the estimated glomerular filtration  rate (eGFR) is the CKD-EPI equation.          Diagnosis/Assessment/Plan:    1. Multiple Sclerosis  · Assessment: Pt's walk is slightly slower, and her gait without walker is simply too unsafe; she has had a fall at work, and she has significant left knee joint hyperextension related to LE weakness; I have encouraged her to use a walker today and she has agreed;   · Imaging: planned April 2019--brain and C spine; 2018 MRI had ? New lesion in C spine vs motion artifact, so will repeat next year  · Disease Modifying Therapies: continue Avonex; pt gets frequent labs done by PCP for DM, so we don't have to order today; continue high dose D3    2. MS Symptom Assessment / Management  · Fatigue: advised CoQ10 for energy, 400mg/day  · Sleep Disturbance: discussed sleep aid, but pt deferred for now; pt will try hs Mg 400mg/hs;   · Gait Disturbance: U-step walker ordered; pt finally agrees; will use at home, and will consider rollator for work; refer to PT [facility], Felicia Schneider; pt also may purse MS Yoga  · No other changes to regimen described in ROS above    Over 50% of this 40 minute visit was spent in direct face to face counseling of the patient about MS, DMT considerations, and MS symptom management.     F/u 6 mo Gretchen Geller PA-C    MS (multiple sclerosis)  -     Ambulatory Referral to Physical/Occupational Therapy  -     WALKER FOR HOME USE    Gait disturbance  -     Ambulatory Referral to Physical/Occupational Therapy  -     WALKER FOR HOME USE

## 2018-08-01 NOTE — TELEPHONE ENCOUNTER
Received fax requesting patient information from [employer] [facility] regarding u-step walker.   Called [facility] to discuss, spoke with Brie. They require an Authorization from [employer] [facility] and failed to get in contact with the company or the patient after three attempts each.   At this time, the order for u-step walker is on hold until we can provide authorization from the insurance.   Called pt to update, left VM.   Called [employer], spoke to Gege, they are unaware of any attempted requests for authorization from [facility], was given number for Auth dept. [phone]  Called [facility] again, spoke to Radha, she reported a different number that they were calling, updated number. They will continue to reach out.

## 2018-08-02 PROBLEM — R26.9 GAIT ABNORMALITY: Status: ACTIVE | Noted: 2018-01-01

## 2018-08-02 PROBLEM — R26.89 DECREASED FUNCTIONAL MOBILITY: Status: ACTIVE | Noted: 2018-01-01

## 2018-08-02 PROBLEM — Z91.81 RISK FOR FALLS: Status: ACTIVE | Noted: 2018-01-01

## 2018-08-02 NOTE — PLAN OF CARE
"TIME RECORD    Date: 08/02/2018    Start Time:  0805  Stop Time:  0845    PROCEDURES:    TIMED  Procedure Min.                         UNTIMED  Procedure Min.   1 mod eval 45         Total Timed Minutes:  0  Total Timed Units:  0  Total Untimed Units:  1   Charges Billed/# of units:  1 mod eval      OUTPATIENT NEUROLOGICAL REHABILITATION  PHYSICAL THERAPY EVALUATION    Onset Date: dx in 2018  Primary Diagnosis:   1. Decreased functional mobility     2. Risk for falls     3. Gait abnormality       Treatment Diagnosis: MS   Past Medical History:   Diagnosis Date    Diabetes mellitus     Hypertension     Multiple sclerosis     Stroke      Precautions: standard, fall risk  Medical Diagnosis: MS  PT Diagnosis: difficulty walking, B pain in knee worse on the L than R  Chief complaint: pain in L knee and starting in R knee  History of Present Illness: Serenity is a 55 y.o. female that presents to [facility] secondary to MS (dx 1990). Onset was severe with with L LE weakness and severe hyperextension deformity on the L causing severe gait abnormality. Multiple episodes of rehab - has lower L  AFO. Pt reporting pain at L knee which she relates to arthritis. Pt is working full time and is active. Pt reported that she has tried the knee cage but did not purchase it b/c "I couldn't move in it".      Prior Therapy: Multiple episodes over the years  Nutrition:  Normal  Social History: Lives with grandson and dtr and 2 young grandkids  Place of Residence (Steps/Adaptations/Levels): Raised home, 8 steps to enter. [place]ailing. Has ramp if needed but able to take stairs at current.   Previous functional status includes: Agustín   Current functional status:  Agustín   Exercise routine prior to onset : None  DME owned: [employer]  Work:  Insurance work.                     Job description includes:  Sitting at desk most of the day but some walking around     Subjective   Pt states: "I know I need to do something, that " is why Im here but I dont want to be in a w/c.   Pain: L knee primarily  Pain at current: 0/10  Pain at best: 5/10  Pain at worst: 10/10  Rest, stretching, and sitting down to relieve pain.      Objective   - Follows commands: 100% of time   - Speech: no deficits     Mental status: alert, oriented to person, place, and time, normal mood, behavior, speech, dress, motor activity, and thought processes  Appearance: Casually dressed  Behavior:  calm and cooperative  Attention Span and Concentration:  Normal     Posture Alignment : mild anteriorlateral lean to the R in standing  Joint integrity: Laxity noted in L ACL and L MCL and L PCL  Skin integrity: intact  Edema: mild edema around L knee     Sensation: Light Touch: Intact                  Proprioception:   Intact     Tone: 0 - No increase in muscle tone  Gets muscle spasms in feet - when laying down if in 1 position too long.      ROM:   UPPER EXTREMITY--AROM/PROM  (R) UE: WNLs  (L) UE: WNLs         RANGE OF MOTION--LOWER EXTREMITIES  (R) LE Hip: normal              Knee: normal              Ankle: normal     (L) LE: Hip: normal              Knee: hyperextends in WB - 35 degrees extension              Ankle: foot drop but PROM to neutral  Laxity in L knee  - anterior and medial and posteriorly     Strength: manual muscle test grades below      Lower Extremity Strength  Right LE   Left LE     Hip Flexion: 5/5 Hip Flexion: 2-/5   Hip Extension:  4-/5 Hip Extension: 3+/5   Hip Abduction: 5/5 Hip Abduction: 4-/5   Hip Adduction: 5/5 Hip Adduction 3+/5   Knee Extension: 5/5 Knee Extension: 4/5   Knee Flexion: 5/5 Knee Flexion: 2-/5   Ankle Dorsiflexion: 5/5 Ankle Dorsiflexion: trace   Ankle Plantarflexion: 5/5 Ankle Plantarflexion: 2/5      Visual/Auditory: denies changes      Functional Mobility (Bed mobility, transfers) - Deann for all. Has managed to find compensations as needed.      Gait Assessment:   - AD used: None  - Assistance: DEANN with AFO     GAIT  DEVIATIONS:  Serenity displays the following deviations with ambulation: R vaulting, L circumduction, L extensor thrust, L severe hyperextension with stance phase     Impairments contributing to deviations: impaired motor control, weakness       Evaluation   Timed Up and Go 14 sec   Sit to stand x 5 21 secs with UE support     ROBLES Balance Assessment     1. Sitting to Standing              3 - able to stand independely using hands  2. Standing Unsupported              4 - able to stand safely 2 minutes without hold  3. Sitting Unsupported              4 - able to sit safely and securely 2 minutes  4. Standing to Sitting              4 - sits safely with minimal use of hands  5. Pivot Transfer             3  6. Standing with Eyes Closed             3  7. Standing with Feet Together              3  8. Reaching Forward with Outstretched Arm              3  9. Retrieving Object from Floor              4 - able to  slipper safely and easily  10. Turning to Look Behind              4 - looks behind from both sides and weights shifts well  11. Turning 360 Degrees              2 - able to turn 360 safely but slowly  12. Placing Alternate Foot on Step              1 - able to complete > 2 steps needs min assist  13. Standing with One Foot in Front              2 - able to take small step indenpendently and hold 30 seconds  14. Standing on One Foot             1  Total: 41  Maximum: 56     Education provided re:role of PT, goals for PT, scheduling - pt verbalized understanding. Discussed insurance limitations with pt. Discussed knee cage brace recommendation and risk of further knee injury without brace support as well as heel lift in the R shoe.      Serenity verbalized good understanding of education provided.   Pt has no cultural, educational or language barriers to learning provided.        Assessment   This is a 55 y.o. female referred to outpatient physical therapy and presents with a medical diagnosis of MS and  demonstrates limitations as described in the problem list. Due to pt's deficits she is starting to have knee pain r/t abnormal forces from severe hyperextension with gait. Pt has extreme L knee hyperextension in WB (35 degrees ext). Pt is open to the idea of a knee cage brace but is hesitant it will be too bulky and show over her pants. Discussed the risk of further knee injury if a support brace of some type is not used. Also discussed that this time it may be successful with a heel lift on the R and correcting any LLD or hip obliquities. Pt has inefficient gait pattern due to this deviation and a brace will also help with her endurance and fatigue issues for daily activities. Recommend DME order for a knee cage brace and will refer pt to an orthotist.     Pt rehab potential is Good. Pt will benefit from continuing skilled outpatient physical therapy to address the deficits listed below in the problem list, provide pt/family education and to maximize pt's level of independence in the home and community environment.         Medical necessity is demonstrated by the following IMPAIRMENTS/PROMBLEM LIST:  1. Fall Risk - impaired balance   2. Unable to participate in daily activities   3. Continued inability to participate in vocational pursuits   4. Requires skilled supervision to complete and progress HEP   5. generalized weakness  6. Decreased CV endurance   7. Decreased standing tolerance   8. Decreased community ambulation   9. Impaired muscle tone      History  Co-morbidities and personal factors that may impact the plan of care Examination  Body Structures and Functions, activity limitations and participation restrictions that may impact the plan of care Clinical Presentation   Decision Making/ Complexity Score   Co-morbidities:   Diabetes mellitus   Hypertension   Multiple sclerosis   Stroke       Personal Factors:     moderate Body Regions:  UE, trunk, LE    Body Systems: musculoskeletal - posture, ROM,  strength; neuromuscular - sensation, balance, gait      Activity limitations: community distance ambulation, walking and carrying objects, cleaning, cooking       Participation Restrictions:   Home, social, community, recreational, work    high     FOTO MS Survey: 51% limitation    moderate   moderate        Anticipated barriers to physical therapy: Hesitation with knee brace.      Pt's spiritual, cultural and educational needs considered and pt agreeable to plan of care and goals as stated below:      GOALS:   pt agrees to goals set.  1. Pt will have completed a consult with an orthotist for a knee brace to address her L knee hyperextension.  2. Pt will improve efficiency with gait pattern with decreased vaulting and circumduction through improved LE strength and coordination.  3. Pt will report knee pain at worst as </= 3/10.  4. Pt will decrease TUG score to </= 13 seconds in order to increase performance with community mobility.  5. Pt will increase Dang Balance score to >/= 50/56 in order to decrease fall risk.  6. Pt will be able to ambulate 600 ft in 6 minutes walk test with least restrictive device without LOB with knee cage on.  7. Pt will be Indep with HEP.         Plan   Cont PT for up to 8 weeks.    Certification Period: 8/2/18 to 10/2/18  Recommended Treatment Plan: 1-2 times per week for 8 weeks:  To recieve Education, HEP, therapeutic exercises, neuromuscular re-education, therapeutic activities, manual therapy, joint mobilizations, and modalities modalities prn, ASTYM prn, kinesiotape prn, Functional Dry Needling prn modalities, ASTYM prn, kinesiotape prn, Functional Dry Needling prn to achieve established goals. Pt may be seen by PTA as part of the rehabilitation team.     Other Recommendations: order for L knee cage fitting with orthotist      Therapist: Brianna Willoughby, PT    I CERTIFY THE NEED FOR THESE SERVICES FURNISHED UNDER THIS PLAN OF TREATMENT AND WHILE UNDER MY CARE    Physician's  comments: ____________________________________________________________________________________________________________________________________________    Physician's Name: ___________________________________

## 2018-08-08 NOTE — PROGRESS NOTES
DAILY TREATMENT NOTE    DATE: 8/8/2018    Start Time:  0845  Stop Time:  0930    PROCEDURES:    TIMED  Procedure Min.   TE 35   NMR 10                 UNTIMED  Procedure Min.             Total Timed Minutes:  45  Total Timed Units:  3  Total Untimed Units:  0  Charges Billed/# of units:  3 (2 TE, 1NMR)      Progress/Current Status    Subjective:     Patient ID: Serenity Powell is a 58 y.o. female.  Diagnosis:   1. Decreased functional mobility     2. Risk for falls     3. Gait abnormality       Pain: 0 /10  Pt stated that she called the orthotist and they still had no order for knee cage.    Objective:     Session initiated with Neuro re-ed and endurance training with B UE/LE extremities for reciprocal motion of all limbs on sci-fit x 8 min at level 2 at > or equal to 50 spm w/o rest. Pt performed TE per log f/b MT stretches with PT 1:1 f/b standing stretches and TE per log.        DATE 8/8/18   Visit 2     POC exp 10/1/18    FOTO 2/5   Standing feet apart --   Standing feet together --   Standing 1/2 tandem --   Standing tandem --   SLS R --   SLS L --   Side stepping --   Cross overs -   Beep board    Dev sequencing    SLR 3 x 10 R LE 1#   clamshells Supine 2 x 10 B   SAQ 3 x 10 1# on R LE   0# on L   bridges 2 x 10 B   Adduction isometric 5'' x 15   LAQ --   HS curls --   HS stretch with strap MT 3 x 30'' on R only   Gastroc stretch Standing on fitter 3 x 30 '' each individually blocking L hyperext   Quad str 3 x 30'' prone L only   Leg press NT   Hip extension --   Hip flexion --   Hip Abduction --   TKE 2 x 10 L PTC cues   Knee flexion --   Toe raises --   Heel raises --   Nu-step 8' L2   Step ups TKE with L on L1 step and R toe tap up 2 x 10   Lateral step ups --   Gait --   INITIALS FS       Assessment:     Pt needs L knee cage and PT is awaiting order for fitting with orthotist.  Until fitting pt will benefit from strength and flexibility training    Patient Education/Response:     Issued in a few weeks.      Plans and Goals:     GOALS:   pt agrees to goals set.  1. Pt will have completed a consult with an orthotist for a knee brace to address her L knee hyperextension.  2. Pt will improve efficiency with gait pattern with decreased vaulting and circumduction through improved LE strength and coordination.  3. Pt will report knee pain at worst as </= 3/10.  4. Pt will decrease TUG score to </= 13 seconds in order to increase performance with community mobility.  5. Pt will increase Dang Balance score to >/= 50/56 in order to decrease fall risk.  6. Pt will be able to ambulate 600 ft in 6 minutes walk test with least restrictive device without LOB with knee cage on.  7. Pt will be Indep with HEP.

## 2018-08-14 NOTE — PROGRESS NOTES
DAILY TREATMENT NOTE    DATE: 8/14/2018    Start Time:  08:02 AM  Stop Time:  08:47 AM    PROCEDURES:    TIMED  Procedure Min.   TE 35   NMR 10                 UNTIMED  Procedure Min.             Total Timed Minutes:  45  Total Timed Units:  3  Total Untimed Units:  0  Charges Billed/# of units:  3 (2 TE, 1NMR)      Progress/Current Status    Subjective:     Patient ID: Serenity Powell is a 58 y.o. female.  Diagnosis:   1. Decreased functional mobility     2. Risk for falls     3. Gait abnormality       Pain: Pt agreeable to PT session. She states she has no complaints of pain upon arrival.     Objective:     Session initiated with Neuro re-ed and endurance training with B UE/LE extremities for reciprocal motion of all limbs on sci-fit x 8 min at level 2 at > or equal to 50 spm w/o rest. Pt performed all therex as per logged below with PTA 1:1.   DATE 8/14/18 8/8/18   Visit 3 2     POC exp 10/1/18     FOTO 3/5 2/5   Standing feet apart - --   Standing feet together - --   Standing 1/2 tandem - --   Standing tandem - --   SLS R - --   SLS L - --   Side stepping - --   Cross overs - -   Beep board -    Dev sequencing     SLR 3 x 10 R LE 1#  2x10 L AAROM  3 x 10 R LE 1#   clamshells Supine 2 x 10 B RTB Supine 2 x 10 B   SAQ 3 x 10 1# on R LE   0# on L 3 x 10 1# on R LE   0# on L   bridges 2 x 10 B 2 x 10 B   Adduction isometric 5'' x 15 5'' x 15   LAQ - --   HS curls - --   HS stretch with strap MT 3 x 30'' on R only MT 3 x 30'' on R only   Gastroc stretch  Standing on fitter 3 x 30 '' each individually blocking L hyperext   Quad str 3 x 30'' prone L only 3 x 30'' prone L only   Leg press  NT   Hip extension - --   Hip flexion - --   Hip Abduction - --   TKE 2 x 10 L PTC cues 2 x 10 L PTC cues   Knee flexion - --   Toe raises - --   Heel raises - --   Nu-step 8' L2 8' L2   Step ups TKE with L on L1 step and R toe tap up 2 x 10 TKE with L on L1 step and R toe tap up 2 x 10   Lateral step ups  --   Gait  --   INITIALS JA  1/6 FS       Assessment:     Pt completed session with no reports of pain. She presented difficulty with hip flexion to left hip, focused standing today with hip flexion AAROM to step until fatigue. Pt able to follow verbal instruction on safety and technique in standing .    Patient Education/Response:   Cont HEP    Plans and Goals:   Cont to advance PT as per POC.   GOALS:   pt agrees to goals set.  1. Pt will have completed a consult with an orthotist for a knee brace to address her L knee hyperextension.  2. Pt will improve efficiency with gait pattern with decreased vaulting and circumduction through improved LE strength and coordination.  3. Pt will report knee pain at worst as </= 3/10.  4. Pt will decrease TUG score to </= 13 seconds in order to increase performance with community mobility.  5. Pt will increase Dang Balance score to >/= 50/56 in order to decrease fall risk.  6. Pt will be able to ambulate 600 ft in 6 minutes walk test with least restrictive device without LOB with knee cage on.  7. Pt will be Indep with HEP.

## 2018-08-14 NOTE — TELEPHONE ENCOUNTER
----- Message from Brianna Willoughby PT sent at 8/14/2018  8:23 AM CDT -----  Regarding: RE: Need order placed for L knee cage  Hi Gretchen     Could you please send the order directly to [facility] by way of fax to [phone].  If you could do this today or tomorrow that would be awesome.      Sorry for the delayed response, I thought I replied to this last week.    Brianna Willoughby  DPT    ----- Message -----  From: Gretchen Geller PA-C  Sent: 8/9/2018  10:53 AM  To: Brianna Willoughby PT  Subject: RE: Need order placed for L knee cage            Hi Brianna,    I'm happy to place an order. Do we place it and fax to you or will you see it in the system and take care of it? Or do we fax to a specific orthotist??    Gretchen  ----- Message -----  From: Brianna Willoughby PT  Sent: 8/8/2018   9:00 AM  To: Gretchen Geller PA-C  Subject: Need order placed for L knee cage                Hi Pascale,     Just evaluated Mr Sherry and really excited to work with her.   Please place an order for a L knee cage fitting with orthotist.       Brianna BET

## 2018-08-17 NOTE — PROGRESS NOTES
Spoke with patient today in regard to smoking cessation progress for 12 month follow up, she states not tobacco free. Patient states she is not interested in returning to the program at this time. Informed patient of benefit period and contact information if any further help or support is needed. Will resolve episode and complete smart form for Quit attempt #1.

## 2018-08-24 NOTE — PROGRESS NOTES
DAILY TREATMENT NOTE    DATE: 8/24/2018    Start Time:  08:05 AM  Stop Time:  08:50 AM    PROCEDURES:    TIMED  Procedure Min.   GT 30   NMR 15                 UNTIMED  Procedure Min.             Total Timed Minutes:  45  Total Timed Units:  3  Total Untimed Units:  0  Charges Billed/# of units:  3 (GTx2, 1xNMR)      Progress/Current Status    Subjective:     Patient ID: Serenity Powell is a 58 y.o. female.  Diagnosis:   1. Decreased functional mobility     2. Risk for falls     3. Gait abnormality       Pain: Pt agreeable to PT session. She arrived to session with Left knee cage (to prevent hyperextension of L knee). She reports no complaints of pain upon arrival.     Objective:     Session initiated with donning of L knee cage and heel lift (4 cm) in R shoe. Pt then performed gait training in //bars with L knee cage, 8 ft x 6 trials, then with quad cane in //bars SBA. Pt then ambulated in gym with CGA w/ Quad Cane 150 ft x 2 trials and 1 seated rest break. Pt then completed therex as pe r 1:1.   DATE 8/24/18 8/14/18 8/8/18   Visit 4 3 2     POC exp 10/1/18      FOTO 4/5 NEXT 3/5 2/5   Standing feet apart - - --   Standing feet together - - --   Standing 1/2 tandem - - --   Standing tandem - - --   SLS R - - --   SLS L - - --   Side stepping - - --   Cross overs - - -   Beep board - -    Dev sequencing -     SLR NT 3 x 10 R LE 1#  2x10 L AAROM  3 x 10 R LE 1#   clamshells NT Supine 2 x 10 B RTB Supine 2 x 10 B   SAQ NT 3 x 10 1# on R LE   0# on L 3 x 10 1# on R LE   0# on L   bridges NT 2 x 10 B 2 x 10 B   Adduction isometric NT 5'' x 15 5'' x 15   LAQ  - --   HS curls  - --   HS stretch with strap NT MT 3 x 30'' on R only MT 3 x 30'' on R only   Gastroc stretch   Standing on fitter 3 x 30 '' each individually blocking L hyperext   Quad str  3 x 30'' prone L only 3 x 30'' prone L only   Leg press   NT   Hip extension x10 B // - --   Hip flexion x10 B // - --   Hip Abduction x10 B // - --   TKE  2 x 10 L PTC cues 2 x  10 L PTC cues   Knee flexion  - --   Toe raises  - --   Heel raises  - --   Nu-step 8 min L2 8' L2 8' L2   Step ups  TKE with L on L1 step and R toe tap up 2 x 10 TKE with L on L1 step and R toe tap up 2 x 10   Lateral step ups   --   Gait   --   INITIALS JA 2/6 JA 1/6 FS       Assessment:     Pt completed session with no reports of pain. Verbal instructions provided for technique, safety and sequencing for proper use of Quad cane. Pt able to follow verbal instructions and completed session with no adverse reactions.     Patient Education/Response:   Cont HEP    Plans and Goals:   Cont to advance PT as per POC. Cont gait training with left knee cage and Quad cane.   GOALS:   pt agrees to goals set.  1. Pt will have completed a consult with an orthotist for a knee brace to address her L knee hyperextension.  2. Pt will improve efficiency with gait pattern with decreased vaulting and circumduction through improved LE strength and coordination.  3. Pt will report knee pain at worst as </= 3/10.  4. Pt will decrease TUG score to </= 13 seconds in order to increase performance with community mobility.  5. Pt will increase Dang Balance score to >/= 50/56 in order to decrease fall risk.  6. Pt will be able to ambulate 600 ft in 6 minutes walk test with least restrictive device without LOB with knee cage on.  7. Pt will be Indep with HEP.

## 2018-08-27 NOTE — TELEPHONE ENCOUNTER
Letter of Medical Necessity, Detailed Written Order was faxed by [facility] for provider signature, but diagnosis was incorrect.  Spoke with [facility] and they will fax a corrected order.

## 2018-08-28 NOTE — PATIENT INSTRUCTIONS
Recommendations for today    We need you to start checking blood pressure twice daily and at the end of 7 days please contact my clinic using my [facility] letting me know how many of the blood pressure numbers were not at target.  Based on this input we will decide if he needs an extra appointment to address blood pressure.  We need you to bring blood pressure numbers to all future appointments because at least for the last 6 months the blood pressure number in the clinic has not been within target and it is difficult to understand how we should proceed with the treatment of your blood pressure.  Having home numbers available at each visit will help to move forward on the appropriate treatment of blood pressure    .  Watching blood pressure  · Make sure you have a blood pressure machine at home.      · Make sure you read the instructions and use it appropriately.    · Please bring blood pressure machine to your appointments initially so we can review your numbers. Your doctor may ask you to keep a log of your numbers instead.    · Check blood pressure daily    · The best time is first thing in the morning AND just prior to bedtime.    · You should sit down at a table for approximately 3-4 minutes quietly.  If you do not have time to do this then you do not have time to check blood pressure and you should wait to take the blood pressure at a different time..    · Keep a log of your blood pressure.  Write down the date and time and the blood pressure number.      · After checking blood pressure if you're not satisfied with the number you should not check on the same arm.  Instead you should check on the opposite arm.  If still not satisfied with the number should not check blood pressure again for approximately one hour.    · Comment next to any abnormally high blood pressure numbers if you can tell that there was a circumstance that led to this value (for example, taking blood pressure when you were upset, in pain or  right after having a cigarette or drinking coffee).     Avoid common mistakes when checking blood pressure  · Taking blood pressure over the top of clothing.  · Checking blood pressure repeatedly on the same arm (better to check on the other arm)  · Using blood pressure cuff that is too small (inflatable portion should cover at least 75% of your upper arm)    GOAL BLOOD PRESSURE:     Top number less than 140 and bottom number less than 90.    Please provide us with your blood pressure numbers

## 2018-08-28 NOTE — PROGRESS NOTES
Portions of this note are generated with voice recognition software. Typographical errors may exist.     SUBJECTIVE:    This is a/an 58 y.o. female here for primary care visit for  Chief Complaint   Patient presents with    Follow-up     3 mos    Hypertension    Diabetes     The patient states that she has had a pruritic patch of skin on her left shoulder for about 2 months.  She denies any associated pain. No associated swelling. Over-the-counter cortisone cream has not helped.      The patient states that she has rare episodes of hypoglycemia.  About once per month.  This tends to coincide with weekends when she will typically skip lunch because she is too preoccupied with childcare activities for her grandchildren.  The patient states that if she skips a meal sometimes she will have a snack but this snack will be low in carbohydrates.  She is able to sense the low blood sugar and immediately take corrective action.    If the patient states that she is complying with all blood pressure medications.  She states that often times the exertion and the pain associated with ambulation and the clinic will raise her blood pressure.  30 min before coming to this appointment she also had a cigarette.  The patient has a blood pressure monitor at home but recently has not been checking blood pressure so she does not know what her baseline blood pressure might be.  She is willing to complete this blood pressure at home and then contact the clinic if her blood pressure numbers at home are consistently outside of range.     She is not using nicotine patches as recommended at the last visit.  She is ambivalent about starting this.  She also has been ambivalent about enrolling in the smoking cessation program again.    The patient states that she has not had her eye examination for the year.  She is willing to pursue diabetic eye examination.    The patient states that she is only using NSAID medication occasionally.  She will  for example after receiving an injection for her multiple sclerosis.  She will take over-the-counter Aleve with 2 ibuprofen.  She will do this maybe once or twice per week.      Medications Reviewed and Updated    Past medical, family, and social histories were reviewed and updated.    Review of Systems negative unless otherwise noted in history of present illness-  ROS    General ROS: negative  Psychological ROS: negative  ENT ROS: negative  Endocrine ROS: Negative  Allergy and Immunology ROS: negative  Cardiovascular ROS: negative  Pulmonary ROS: Negative  Gastrointestinal ROS: negative  Genito-Urinary ROS: negative  Musculoskeletal ROS: negative  Neurological ROS: negative  Dermatological ROS: negative        Allergic:  Review of patient's allergies indicates:  No Known Allergies    OBJECTIVE:  BP: 138/88 Pulse: 64    Wt Readings from Last 3 Encounters:   08/28/18 94 kg (207 lb 3.7 oz)   07/11/18 93.5 kg (206 lb 2.1 oz)   04/03/18 94.1 kg (207 lb 8 oz)    Body mass index is 34.49 kg/m².  Previous Blood Pressure Readings :   BP Readings from Last 3 Encounters:   08/28/18 138/88   07/11/18 (!) 192/72   05/24/18 (!) 140/60       Physical Exam    GEN: No apparent distress  HEENT: sclera non-icteric, conjunctiva clear  CV: no peripheral edema  PULM: breathing non-labored  ABD: Obese, protuberant abdomen.  PSYCH: appropriate affect  MSK: able to rise from chair without assistance  SKIN: normal skin turgor.  Flesh-colored nodules as photographed.      Pertinent Labs Reviewed   .A total of 40 minutes were spent face-to-face with the patient during this encounter and over half of that time was spent on counseling and coordination of care.  We discussed in depth the importance of primary prevention of diabetes with aggressive treatment of the condition.  I also educated the patient about lifestyle modifications which may improve the condition.            ASSESSMENT/PLAN:    Dermatitis due to unknown cause.Condition not  optimally controlled. Detailed counseling on self care measures. Plan to monitor clinically in addition to plan below or as listed on After Visit Summary. -     triamcinolone acetonide 0.1% (KENALOG) 0.1 % cream; Apply topically 2 (two) times daily. for 10 days  Dispense: 80 g; Refill: 0    Secondary hypertension.Condition unstable.  Counseling given today on self-care measures. Plan to monitor clinically. Continue current medical plan.     Hypertension complicating diabetes.Condition stable.  Counseling given today on self-care measures. Plan to monitor clinically. Continue current medical plan.     Diabetic eye exam  -     Diabetic Eye Screening Photo; Future    Elevated serum creatinine.Etiology unclear. Not controlled. Further evaluation warranted.  Recommendations as below or as written on After Visit Summary.     Tobacco dependence.Condition not optimally controlled.  Patient not ready for definitive treatment plan at this time.  Plan to readdress at next clinic visit.      Future Appointments   Date Time Provider Department Center   8/28/2018  8:45 AM KENJULIA, DIABETIC [facility]   8/29/2018  8:00 AM Brianna Willoughby, PT [facility]   9/6/2018  8:00 AM Ze Cota, PTA [facility]   9/11/2018  8:00 AM Ze Cota, PTA [facility]   9/17/2018  8:00 AM Ze Cota, PTA [facility]   9/28/2018  8:00 AM Brianna Willoughby, PT [facility] [place] [facility]   11/26/2018  9:15 AM LAB, [facility] [place]   11/29/2018  8:20 AM Chase Nelson MD [facility]   1/11/2019  7:45 AM Gretchen Geller PA-C [facility]  8/28/2018  8:33 AM

## 2018-08-28 NOTE — PROGRESS NOTES
Serenity Powell is a 58 y.o. female here for a diabetic eye screening with non-dilated fundus photos per Dr Nelson    Patient cooperative?: Yes  Small pupils?: Yes  Last eye exam: 08/4/2017    For exam results, see Encounter Report.

## 2018-08-29 NOTE — TELEPHONE ENCOUNTER
Faxed Detailed Written Order & Letter of Med Necessity to [facility] [place] [phone] for pt's Armenian knee cage.

## 2018-08-29 NOTE — PROGRESS NOTES
DAILY TREATMENT NOTE    DATE: 8/29/2018    Start Time:  0800  Stop Time:  0845    PROCEDURES:    TIMED  Procedure Min.   Gait 15   NMR 15   TE 15             UNTIMED  Procedure Min.             Total Timed Minutes:  45  Total Timed Units:  3  Total Untimed Units:  0  Charges Billed/# of units:  3 (1NMR, 1Gait, 1TE)      Progress/Current Status    Subjective:     Patient ID: Serenity Powell is a 58 y.o. female.  Diagnosis:   1. Decreased functional mobility     2. Risk for falls     3. Gait abnormality       Pain: 0 /10  Pt stated that she was sore in B shoulders from last session but it went away.    Objective:     Session initiated with prone quad stretch and seated piriformis stretch 3  X 30'' each. PT donned knee cage. Pt performed NMR and TE per log with cage on L LE f/b gait training 280 ft with QC f/b Neuro re-ed and endurance training with B UE/LE extremities for reciprocal motion of all limbs on sci-fit x 8 min at level 2 at > or equal to 50 spm  w/o rest.        DATE 8/31/18 8/24/18 8/14/18 8/8/18   Visit 5 4 3 2     POC exp 10/1/18       FOTO 5/5 4/5 NEXT 3/5 2/5   Standing feet apart -- - - --   Standing feet together -- - - --   Standing 1/2 tandem -- - - --   Standing tandem -- - - --   SLS R -- - - --   SLS L -- - - --   Side stepping -- - - --   Cross overs -- - - -   Beep board -- - -    Dev sequencing -- -     SLR -- NT 3 x 10 R LE 1#  2x10 L AAROM  3 x 10 R LE 1#   clamshells -- NT Supine 2 x 10 B RTB Supine 2 x 10 B   SAQ -- NT 3 x 10 1# on R LE   0# on L 3 x 10 1# on R LE   0# on L   bridges -- NT 2 x 10 B 2 x 10 B   Adduction isometric -- NT 5'' x 15 5'' x 15   LAQ --  - --   HS curls --  - --   HS stretch with strap -- NT MT 3 x 30'' on R only MT 3 x 30'' on R only   Gastroc stretch --   Standing on fitter 3 x 30 '' each individually blocking L hyperext   Quad str 3 x 30'' prone L only  3 x 30'' prone L only 3 x 30'' prone L only   Leg press --   NT   Hip extension 2 x 10 L w/ R standing on Block  x10 B // - --   Hip flexion 2 x 10 L  w/ R standing on Block x10 B // - --   Hip Abduction 2 x 10 L  w/ R standing on Block  x10 B // - --   TKE 2 x 10 L PTC cues  2 x 10 L PTC cues 2 x 10 L PTC cues   Knee flexion --  - --   Toe raises --  - --   Heel raises --  - --   Nu-step 8' L2 8 min L2 8' L2 8' L2   Step ups TKE with L on L1 step and R toe tap up 2 x 10  TKE with L on L1 step and R toe tap up 2 x 10 TKE with L on L1 step and R toe tap up 2 x 10   Lateral step ups --   --   Gait 250 ft with QC    --   INITIALS FS JA 2/6 JA 1/6 FS     Assessment:     Pt gait is improving with knee cage on L and she will cont to benefit from L LE strengthening and gait training with orthotic.     Patient Education/Response:     CONT HEP    Plans and Goals:     Cont to advance PT as per POC. Cont gait training with left knee cage and Quad cane.   GOALS:   pt agrees to goals set.  1. Pt will have completed a consult with an orthotist for a knee brace to address her L knee hyperextension.  2. Pt will improve efficiency with gait pattern with decreased vaulting and circumduction through improved LE strength and coordination.  3. Pt will report knee pain at worst as </= 3/10.  4. Pt will decrease TUG score to </= 13 seconds in order to increase performance with community mobility.  5. Pt will increase Dang Balance score to >/= 50/56 in order to decrease fall risk.  6. Pt will be able to ambulate 600 ft in 6 minutes walk test with least restrictive device without LOB with knee cage on.  7. Pt will be Indep with HEP.

## 2018-09-06 NOTE — PROGRESS NOTES
"DAILY TREATMENT NOTE    DATE: 9/6/2018    Start Time:  0805  Stop Time:  0845    PROCEDURES:    TIMED  Procedure Min.   Gait 15   NMR 15   TE 10             UNTIMED  Procedure Min.             Total Timed Minutes:  40  Total Timed Units:  3  Total Untimed Units:  0  Charges Billed/# of units:  3 (1NMR, 1Gait, 1TE)      Progress/Current Status    Subjective:     Patient ID: Serenity Powell is a 58 y.o. female.  Diagnosis:   1. Decreased functional mobility     2. Risk for falls     3. Gait abnormality       Pain: Pt reports she is feeling stronger with B LE's since she started PT sessions. " Im ready". No reports of pain upon arrival.     Objective:     Session initiated with pt donning L knee cage. Pt performed NMR and all therex as per log with cage on L LE. Pt then completed gait training 280 ft with Small base tripod cane on level surfaces and close SBA. Session ended with  Neuro re-ed and endurance training with B UE/LE extremities for reciprocal motion of all limbs on sci-fit x 8 min at level 2 at > or equal to 50 spm  w/o rest.        DATE 9/6/18 8/31/18 8/24/18 8/14/18 8/8/18   Visit 6 5 4 3 2     POC exp 10/1/18        FOTO 6/10 DONE 5/5 4/5 NEXT 3/5 2/5   Standing feet apart - -- - - --   Standing feet together - -- - - --   Standing 1/2 tandem - -- - - --   Standing tandem - -- - - --   SLS R - -- - - --   SLS L - -- - - --   Side stepping - -- - - --   Cross overs - -- - - -   Beep board - -- - -    Dev sequencing - -- -     SLR - -- NT 3 x 10 R LE 1#  2x10 L AAROM  3 x 10 R LE 1#   clamshells - -- NT Supine 2 x 10 B RTB Supine 2 x 10 B   SAQ - -- NT 3 x 10 1# on R LE   0# on L 3 x 10 1# on R LE   0# on L   bridges - -- NT 2 x 10 B 2 x 10 B   Adduction isometric - -- NT 5'' x 15 5'' x 15   LAQ - --  - --   HS curls - --  - --   HS stretch with strap - -- NT MT 3 x 30'' on R only MT 3 x 30'' on R only   Gastroc stretch - --   Standing on fitter 3 x 30 '' each individually blocking L hyperext   Quad str NT 3 " x 30'' prone L only  3 x 30'' prone L only 3 x 30'' prone L only   Leg press  --   NT   Hip extension 2 x 10 L  w/ R standing on Block  2 x 10 L w/ R standing on Block x10 B // - --   Hip flexion 2 x 10 L  w/ R standing on Block  2 x 10 L  w/ R standing on Block x10 B // - --   Hip Abduction 2 x 10 L  w/ R standing on Block  2 x 10 L  w/ R standing on Block  x10 B // - --   TKE 2x10 PTC  2 x 10 L PTC cues  2 x 10 L PTC cues 2 x 10 L PTC cues   Knee flexion - --  - --   Toe raises - --  - --   Heel raises - --  - --   Nu-step 8' L2 8' L2 8 min L2 8' L2 8' L2   Step ups TKE with L on L1 step and R toe tap up 2 x 10 TKE with L on L1 step and R toe tap up 2 x 10  TKE with L on L1 step and R toe tap up 2 x 10 TKE with L on L1 step and R toe tap up 2 x 10   Lateral step ups  --   --   Gait 250 ft with QC  250 ft with QC    --   INITIALS JA 1/6 FS JA 2/6 JA 1/6 FS     Assessment:     Pt completed session with no reports of pain. She experienced 2 LOB requiring min assist to recover. Pt able to perform standing activities with verbal instruction on safety and technique.     Patient Education/Response:     CONT HEP    Plans and Goals:     Cont to advance PT as per POC. Cont gait training with left knee cage and Quad cane.   GOALS:   pt agrees to goals set.  1. Pt will have completed a consult with an orthotist for a knee brace to address her L knee hyperextension.  2. Pt will improve efficiency with gait pattern with decreased vaulting and circumduction through improved LE strength and coordination.  3. Pt will report knee pain at worst as </= 3/10.  4. Pt will decrease TUG score to </= 13 seconds in order to increase performance with community mobility.  5. Pt will increase Dang Balance score to >/= 50/56 in order to decrease fall risk.  6. Pt will be able to ambulate 600 ft in 6 minutes walk test with least restrictive device without LOB with knee cage on.  7. Pt will be Indep with HEP.

## 2018-09-11 NOTE — PROGRESS NOTES
"DAILY TREATMENT NOTE    DATE: 9/11/2018    Start Time:  0805  Stop Time:  0845    PROCEDURES:    TIMED  Procedure Min.   MT 10   TE 20   NMR 10             UNTIMED  Procedure Min.             Total Timed Minutes:  40  Total Timed Units:  3  Total Untimed Units:  0  Charges Billed/# of units:  3 (1NMR, 1MT, 1TE)      Progress/Current Status    Subjective:     Patient ID: Serenity Powell is a 58 y.o. female.  Diagnosis:   1. Decreased functional mobility     2. Risk for falls     3. Gait abnormality       Pain: Pt reports she has been having "deep pain" in left hip (lateral aspect). It feels like my knee brace moves and has made me sore the last couple of days.    Objective:     Session arrived to session with Left knee cage donned. She was positioned in R sidelying for manual therapy consisting of STM to lateral left hip musculature, STM to IT band, STM to gluteal musculature x 10 min. Pt then completed all therex as per logged below 1;1 w PTA in //bars.Session ended with  Neuro re-ed and endurance training with B UE/LE extremities for reciprocal motion of all limbs on sci-fit x 8 min at level 2 at > or equal to 50 spm  w/o rest.        DATE 9/11/18 9/6/18 8/31/18 8/24/18 8/14/18 8/8/18   Visit 7 6 5 4 3 2     POC exp 10/1/18         FOTO 7/10 6/10 DONE 5/5 4/5 NEXT 3/5 2/5   Standing feet apart - - -- - - --   Standing feet together - - -- - - --   Standing 1/2 tandem - - -- - - --   Standing tandem - - -- - - --   SLS R - - -- - - --   SLS L - - -- - - --   Side stepping - - -- - - --   Cross overs - - -- - - -   Beep board - - -- - -    Dev sequencing - - -- -     SLR - - -- NT 3 x 10 R LE 1#  2x10 L AAROM  3 x 10 R LE 1#   clamshells - - -- NT Supine 2 x 10 B RTB Supine 2 x 10 B   SAQ - - -- NT 3 x 10 1# on R LE   0# on L 3 x 10 1# on R LE   0# on L   bridges - - -- NT 2 x 10 B 2 x 10 B   Adduction isometric - - -- NT 5'' x 15 5'' x 15   LAQ - - --  - --   HS curls - - --  - --   HS stretch with strap - - -- NT MT " "3 x 30'' on R only MT 3 x 30'' on R only   Gastroc stretch - - --   Standing on fitter 3 x 30 '' each individually blocking L hyperext   Quad str - NT 3 x 30'' prone L only  3 x 30'' prone L only 3 x 30'' prone L only   Leg press   --   NT   Hip extension 2 x 10 L  w/ R standing on Block  2 x 10 L  w/ R standing on Block  2 x 10 L w/ R standing on Block x10 B // - --   Hip flexion 2 x 10 L  w/ R standing on Block  2 x 10 L  w/ R standing on Block  2 x 10 L  w/ R standing on Block x10 B // - --   Hip Abduction 2 x 10 L  w/ R standing on Block  2 x 10 L  w/ R standing on Block  2 x 10 L  w/ R standing on Block  x10 B // - --   TKE 2x10 PRC 2x10 PTC  2 x 10 L PTC cues  2 x 10 L PTC cues 2 x 10 L PTC cues   Knee flexion - - --  - --   Toe raises - - --  - --   Heel raises - - --  - --   Nu-step  8' L2 8' L2 8 min L2 8' L2 8' L2   Step ups  TKE with L on L1 step and R toe tap up 2 x 10 TKE with L on L1 step and R toe tap up 2 x 10  TKE with L on L1 step and R toe tap up 2 x 10 TKE with L on L1 step and R toe tap up 2 x 10   Lateral step ups   --   --   Gait  250 ft with QC  250 ft with QC    --   INITIALS SLIME 2/6 SLIME 1/6 FS SLIME 2/6 SLIME 1/6 FS     Assessment:     Pt responded well to manual therapy to left hip today. She stated "i feel a little better". Pt with no adverse reaction to today's session. Tolerated standing therex as per logged above, within tolerance.     Patient Education/Response:     CONT HEP    Plans and Goals:     Cont to advance PT as per POC. Cont gait training with left knee cage and Quad cane.   GOALS:   pt agrees to goals set.  1. Pt will have completed a consult with an orthotist for a knee brace to address her L knee hyperextension.  2. Pt will improve efficiency with gait pattern with decreased vaulting and circumduction through improved LE strength and coordination.  3. Pt will report knee pain at worst as </= 3/10.  4. Pt will decrease TUG score to </= 13 seconds in order to increase performance " with community mobility.  5. Pt will increase Dang Balance score to >/= 50/56 in order to decrease fall risk.  6. Pt will be able to ambulate 600 ft in 6 minutes walk test with least restrictive device without LOB with knee cage on.  7. Pt will be Indep with HEP.

## 2018-09-14 NOTE — PROGRESS NOTES
DAILY TREATMENT NOTE    DATE: 9/14/2018    Start Time:  0800  Stop Time:  0845    PROCEDURES:    TIMED  Procedure Min.   TE 20   NMR 10   MT 15             UNTIMED  Procedure Min.             Total Timed Minutes:  45  Total Timed Units:  3  Total Untimed Units:  0  Charges Billed/# of units:  3 (1TE, 1NMR, 1MT)      Progress/Current Status    Subjective:     Patient ID: Serenity Powell is a 58 y.o. female.  Diagnosis:   1. Decreased functional mobility     2. Risk for falls     3. Gait abnormality       Pain: 5 /10  Pt stated that this week her pain is still severe in her L hip and L knee cap.    Objective:     PT performed MT on mat in SL to L piriformis consisting of STM/MFR f/b L IT STM with foam roller and MFR f/b grade II patellar mobs to L knee f/b prone quad stretch.  Pt performed TE in // bars x 20' f/b Neuro re-ed and endurance training with B UE/LE extremities for reciprocal motion of all limbs on sci-fit x 8 min at level 2.5 at > or equal to 50 spm w/o rest.        DATE 9/14/18 9/11/18 9/6/18 8/31/18 8/24/18 8/14/18 8/8/18   Visit 8 7 6 5 4 3 2     POC exp 10/1/18          FOTO 8/10 7/10 6/10 DONE 5/5 4/5 NEXT 3/5 2/5   Standing feet apart -- - - -- - - --   Standing feet together -- - - -- - - --   Standing 1/2 tandem -- - - -- - - --   Standing tandem -- - - -- - - --   SLS R -- - - -- - - --   SLS L -- - - -- - - --   Side stepping -- - - -- - - --   Cross overs -- - - -- - - -   Beep board -- - - -- - -    Dev sequencing -- - - -- -     SLR -- - - -- NT 3 x 10 R LE 1#  2x10 L AAROM  3 x 10 R LE 1#   clamshells -- - - -- NT Supine 2 x 10 B RTB Supine 2 x 10 B   SAQ -- - - -- NT 3 x 10 1# on R LE   0# on L 3 x 10 1# on R LE   0# on L   bridges -- - - -- NT 2 x 10 B 2 x 10 B   Adduction isometric -- - - -- NT 5'' x 15 5'' x 15   LAQ -- - - --  - --   HS curls -- - - --  - --   HS stretch with strap -- - - -- NT MT 3 x 30'' on R only MT 3 x 30'' on R only   Gastroc stretch -- - - --   Standing on fitter 3 x  30 '' each individually blocking L hyperext   Quad str ---- - NT 3 x 30'' prone L only  3 x 30'' prone L only 3 x 30'' prone L only   Leg press ---   --   NT   Hip extension 2 x 10 L  w/ R standing on Block 2 x 10 L  w/ R standing on Block  2 x 10 L  w/ R standing on Block  2 x 10 L w/ R standing on Block x10 B // - --   Hip flexion 2 x 10 L  w/ R standing on Block  2 x 10 L  w/ R standing on Block  2 x 10 L  w/ R standing on Block  2 x 10 L  w/ R standing on Block x10 B // - --   Hip Abduction 2 x 10 L  w/ R standing on Block 2 x 10 L  w/ R standing on Block  2 x 10 L  w/ R standing on Block  2 x 10 L  w/ R standing on Block  x10 B // - --   TKE next 2x10 PRC 2x10 PTC  2 x 10 L PTC cues  2 x 10 L PTC cues 2 x 10 L PTC cues   Knee flexion -- - - --  - --   Toe raises -- - - --  - --   Heel raises -- - - --  - --   Nu-step 8' 2.5  8' L2 8' L2 8 min L2 8' L2 8' L2   Step ups TKE with L on L1 step and R toe tap up 2 x 10  TKE with L on L1 step and R toe tap up 2 x 10 TKE with L on L1 step and R toe tap up 2 x 10  TKE with L on L1 step and R toe tap up 2 x 10 TKE with L on L1 step and R toe tap up 2 x 10   Lateral step ups --   --   --   Gait --  250 ft with QC  250 ft with QC    --   INITIALS FS JA 2/6 JA 1/6 FS JA 2/6 JA 1/6 FS       Assessment:     Pt's L LE mechanical have been changed drastically with the knee cage preventing severe hyperextension and     Patient Education/Response:     CONT HEP    Plans and Goals:     Cont to advance PT as per POC. Cont gait training with left knee cage and Quad cane.   GOALS:   pt agrees to goals set.  1. Pt will have completed a consult with an orthotist for a knee brace to address her L knee hyperextension.  2. Pt will improve efficiency with gait pattern with decreased vaulting and circumduction through improved LE strength and coordination.  3. Pt will report knee pain at worst as </= 3/10.  4. Pt will decrease TUG score to </= 13 seconds in order to increase performance with  community mobility.  5. Pt will increase Dang Balance score to >/= 50/56 in order to decrease fall risk.  6. Pt will be able to ambulate 600 ft in 6 minutes walk test with least restrictive device without LOB with knee cage on.  7. Pt will be Indep with HEP.

## 2018-09-17 NOTE — PROGRESS NOTES
"DAILY TREATMENT NOTE    DATE: 9/17/2018    Start Time:  0805  Stop Time:  0845    PROCEDURES:    TIMED  Procedure Min.   TE 30   NMR 15                 UNTIMED  Procedure Min.             Total Timed Minutes:  45  Total Timed Units:  3  Total Untimed Units:  0  Charges Billed/# of units:  3 (2TE, 1NMR)      Progress/Current Status    Subjective:     Patient ID: Serenity Powell is a 58 y.o. female.  Diagnosis:   1. Decreased functional mobility     2. Risk for falls     3. Gait abnormality       Pain: Pt reports she did not wear brace this weekend and did no experience much pain in lower back and Left hip. "I rested"    Objective:       Pt performed all therex as per logged below 1:1 / PTA in // bars x 30 min. She then completed 10 min on NMR,   Neuro re-ed and endurance training with B UE/LE extremities for reciprocal motion of all limbs on sci-fit x 8 min at level 2.5 at > or equal to 50 spm w/o rest.        DATE 9/17/18 9/14/18 9/11/18 9/6/18 8/31/18 8/24/18 8/14/18 8/8/18   Visit 9 8 7 6 5 4 3 2     POC exp 10/1/18           FOTO 9/10 8/10 7/10 6/10 DONE 5/5 4/5 NEXT 3/5 2/5   Standing feet apart - -- - - -- - - --   Standing feet together - -- - - -- - - --   Standing 1/2 tandem - -- - - -- - - --   Standing tandem - -- - - -- - - --   SLS R - -- - - -- - - --   SLS L - -- - - -- - - --   Side stepping - -- - - -- - - --   Cross overs - -- - - -- - - -   Beep board - -- - - -- - -    Dev sequencing - -- - - -- -     SLR - -- - - -- NT 3 x 10 R LE 1#  2x10 L AAROM  3 x 10 R LE 1#   clamshells - -- - - -- NT Supine 2 x 10 B RTB Supine 2 x 10 B   SAQ - -- - - -- NT 3 x 10 1# on R LE   0# on L 3 x 10 1# on R LE   0# on L   bridges - -- - - -- NT 2 x 10 B 2 x 10 B   Adduction isometric - -- - - -- NT 5'' x 15 5'' x 15   LAQ - -- - - --  - --   HS curls - -- - - --  - --   HS stretch with strap - -- - - -- NT MT 3 x 30'' on R only MT 3 x 30'' on R only   Gastroc stretch - -- - - --   Standing on fitter 3 x 30 '' each " individually blocking L hyperext   Quad str - ---- - NT 3 x 30'' prone L only  3 x 30'' prone L only 3 x 30'' prone L only   Leg press - ---   --   NT   Hip extension 2 x 10 L  w/ R standing on Block 2 x 10 L  w/ R standing on Block 2 x 10 L  w/ R standing on Block  2 x 10 L  w/ R standing on Block  2 x 10 L w/ R standing on Block x10 B // - --   Hip flexion 2 x 10 L  w/ R standing on Block 2 x 10 L  w/ R standing on Block  2 x 10 L  w/ R standing on Block  2 x 10 L  w/ R standing on Block  2 x 10 L  w/ R standing on Block x10 B // - --   Hip Abduction 2 x 10 L  w/ R standing on Block 2 x 10 L  w/ R standing on Block 2 x 10 L  w/ R standing on Block  2 x 10 L  w/ R standing on Block  2 x 10 L  w/ R standing on Block  x10 B // - --   TKE 2x15 PTC next 2x10 PRC 2x10 PTC  2 x 10 L PTC cues  2 x 10 L PTC cues 2 x 10 L PTC cues   Knee flexion - -- - - --  - --   Toe raises - -- - - --  - --   Heel raises - -- - - --  - --   Nu-step 8 min level 2.5 8' 2.5  8' L2 8' L2 8 min L2 8' L2 8' L2   Step ups TKE with L on L1 step and R toe tap up 2 x 10 TKE with L on L1 step and R toe tap up 2 x 10  TKE with L on L1 step and R toe tap up 2 x 10 TKE with L on L1 step and R toe tap up 2 x 10  TKE with L on L1 step and R toe tap up 2 x 10 TKE with L on L1 step and R toe tap up 2 x 10   Lateral step ups - --   --   --   Gait - --  250 ft with QC  250 ft with QC    --   INITIALS JA 1/6 FS JA 2/6 JA 1/6 FS JA 2/6 JA 1/6 FS       Assessment:     Pt tolerated session with no reports of pain, completed all standing activities with no adverse reactions.     Patient Education/Response:     CONT HEP    Plans and Goals:     Cont to advance PT as per POC. Cont gait training with left knee cage and Quad cane.   GOALS:   pt agrees to goals set.  1. Pt will have completed a consult with an orthotist for a knee brace to address her L knee hyperextension.  2. Pt will improve efficiency with gait pattern with decreased vaulting and circumduction  through improved LE strength and coordination.  3. Pt will report knee pain at worst as </= 3/10.  4. Pt will decrease TUG score to </= 13 seconds in order to increase performance with community mobility.  5. Pt will increase Dang Balance score to >/= 50/56 in order to decrease fall risk.  6. Pt will be able to ambulate 600 ft in 6 minutes walk test with least restrictive device without LOB with knee cage on.  7. Pt will be Indep with HEP.

## 2018-09-20 NOTE — PROGRESS NOTES
DAILY TREATMENT NOTE    DATE: 9/20/2018    Start Time:  0800  Stop Time:  0845    PROCEDURES:    TIMED  Procedure Min.   NMR 10   TE 20   MT 15             UNTIMED  Procedure Min.             Total Timed Minutes:  45  Total Timed Units:  3  Total Untimed Units:  0  Charges Billed/# of units:  3 (1NMR, 1TE, 1MT)      Progress/Current Status    Subjective:     Patient ID: Serenity Powell is a 58 y.o. female.  Diagnosis:   1. Decreased functional mobility     2. Risk for falls     3. Gait abnormality       Pain: 3 /10  Pt stated that yesterday she wore the brace the whole time at work and she felt much better than last week.  She feel like she needs to just wear it to tolerance.    Objective:     Session initiated with Neuro re-ed and endurance training with B UE/LE extremities for reciprocal motion of all limbs on sci-fit x 8 min at level 2.5 at > or equal to 50 spm w/o rest.   PT performed manual stretches in prone to L quad 3 x 30'' f/b B HS stretch, B hip ER stretch.  Pt performed TE in // bars x 20'.    DATE 9/20/18 9/17/18 9/14/18 9/11/18   Visit 10 9 8 7     POC exp 10/1/18       FOTO 10/10 9/10 8/10 7/10   Standing feet apart -- - -- -   Standing feet together -- - -- -   Standing 1/2 tandem -- - -- -   Standing tandem -- - -- -   SLS R -- - -- -   SLS L -- - -- -   Side stepping -- - -- -   Cross overs -- - -- -   Beep board -- - -- -   Dev sequencing -- - -- -   SLR -- - -- -   clamshells -- - -- -   SAQ -- - -- -   bridges -- - -- -   Adduction isometric -- - -- -   LAQ -- - -- -   HS curls -- - -- -   HS stretch with strap -- - -- -   Gastroc stretch -- - -- -   Quad str -- - ---- -   Leg press -- - ---    Hip extension 2 x 12 L  w/ R standing on Block 2 x 10 L  w/ R standing on Block 2 x 10 L  w/ R standing on Block 2 x 10 L  w/ R standing on Block    Hip flexion 2 x 12 L  w/ R standing on Block 2 x 10 L  w/ R standing on Block 2 x 10 L  w/ R standing on Block  2 x 10 L  w/ R standing on Block    Hip  Abduction 2 x 12 L  w/ R standing on Block 2 x 10 L  w/ R standing on Block 2 x 10 L  w/ R standing on Block 2 x 10 L  w/ R standing on Block    TKE 2x15 PTC 2x15 PTC next 2x10 PRC   Knee flexion -- - -- -   Toe raises -- - -- -   Heel raises -- - -- -   Nu-step 8 min level 2.5 8 min level 2.5 8' 2.5    Step ups TKE with L on L1 step and R toe tap up 2 x 10 TKE with L on L1 step and R toe tap up 2 x 10 TKE with L on L1 step and R toe tap up 2 x 10    Lateral step ups -- - --    Gait -- - --    INITIALS FS JA 1/6 FS  2/6       Assessment:     Pt is responding well to wearing her knee cage when moving and walking to her level of tolerance due to the L LE fatigue and hip pain that results from wearing it to long.    Patient Education/Response:     CONT HEP    Plans and Goals:     Cont to advance PT as per POC. Cont gait training with left knee cage and Quad cane.   GOALS:   pt agrees to goals set.  1. Pt will have completed a consult with an orthotist for a knee brace to address her L knee hyperextension.  2. Pt will improve efficiency with gait pattern with decreased vaulting and circumduction through improved LE strength and coordination.  3. Pt will report knee pain at worst as </= 3/10.  4. Pt will decrease TUG score to </= 13 seconds in order to increase performance with community mobility.  5. Pt will increase Dang Balance score to >/= 50/56 in order to decrease fall risk.  6. Pt will be able to ambulate 600 ft in 6 minutes walk test with least restrictive device without LOB with knee cage on.  7. Pt will be Indep with HEP.

## 2018-09-26 NOTE — PROGRESS NOTES
DAILY TREATMENT NOTE    DATE: 9/26/2018    Start Time:  08:55 AM  Stop Time:  09:30 AM    PROCEDURES:    TIMED  Procedure Min.   TE 15   NMR 15                 UNTIMED  Procedure Min.             Total Timed Minutes:  30  Total Timed Units:  2  Total Untimed Units:  0  Charges Billed/# of units:  2 (1NMR, 1TE)      Progress/Current Status    Subjective:     Patient ID: Serenity Powell is a 58 y.o. female.  Diagnosis:   1. Decreased functional mobility     2. Risk for falls     3. Gait abnormality       Pain: pt reports she has note used her SPC for the past 2 days. She is agreeable to PT session.     Objective:     Session initiated with standing B LE therex as per logged below 1:1 w/ PTA. She then performed Neuro re-ed and endurance training with B UE/LE extremities for reciprocal motion of all limbs on sci-fit x 8 min at level 2.5 at > or equal to 50 spm w/o rest.       DATE 9/26/18 9/20/18 9/17/18 9/14/18 9/11/18   Visit 11 10 9 8 7     POC exp 10/1/18        FOTO DONE 10/10 9/10 8/10 7/10   Standing feet apart - -- - -- -   Standing feet together - -- - -- -   Standing 1/2 tandem - -- - -- -   Standing tandem - -- - -- -   SLS R - -- - -- -   SLS L - -- - -- -   Side stepping - -- - -- -   Cross overs - -- - -- -   Beep board - -- - -- -   Dev sequencing - -- - -- -   SLR - -- - -- -   clamshells - -- - -- -   SAQ - -- - -- -   bridges - -- - -- -   Adduction isometric - -- - -- -   LAQ - -- - -- -   HS curls - -- - -- -   HS stretch with strap - -- - -- -   Gastroc stretch - -- - -- -   Quad str - -- - ---- -   Leg press - -- - ---    Hip extension 2 x 12 L  w/ R standing on Block 2 x 12 L  w/ R standing on Block 2 x 10 L  w/ R standing on Block 2 x 10 L  w/ R standing on Block 2 x 10 L  w/ R standing on Block    Hip flexion 2 x 12 L  w/ R standing on Block 2 x 12 L  w/ R standing on Block 2 x 10 L  w/ R standing on Block 2 x 10 L  w/ R standing on Block  2 x 10 L  w/ R standing on Block    Hip Abduction 2 x 12 L   w/ R standing on Block 2 x 12 L  w/ R standing on Block 2 x 10 L  w/ R standing on Block 2 x 10 L  w/ R standing on Block 2 x 10 L  w/ R standing on Block    TKE 2x15 PTC 2x15 PTC 2x15 PTC next 2x10 PRC   Knee flexion - -- - -- -   Toe raises - -- - -- -   Heel raises - -- - -- -   Nu-step  8 min level 2.5 8 min level 2.5 8' 2.5    Step ups TKE with L on L1 step and R toe tap up 2 x 10 TKE with L on L1 step and R toe tap up 2 x 10 TKE with L on L1 step and R toe tap up 2 x 10 TKE with L on L1 step and R toe tap up 2 x 10    Lateral step ups  -- - --    Gait  -- - --    INITIALS JA 1/6 FS  1/6 FS  2/6       Assessment:     Pt completed session with no reports of pain. She performed all therex with no LOB/ SOB noted.    Patient Education/Response:     CONT HEP    Plans and Goals:     Cont to advance PT as per POC. Cont gait training with left knee cage and Quad cane.   GOALS:   pt agrees to goals set.  1. Pt will have completed a consult with an orthotist for a knee brace to address her L knee hyperextension.  2. Pt will improve efficiency with gait pattern with decreased vaulting and circumduction through improved LE strength and coordination.  3. Pt will report knee pain at worst as </= 3/10.  4. Pt will decrease TUG score to </= 13 seconds in order to increase performance with community mobility.  5. Pt will increase Dang Balance score to >/= 50/56 in order to decrease fall risk.  6. Pt will be able to ambulate 600 ft in 6 minutes walk test with least restrictive device without LOB with knee cage on.  7. Pt will be Indep with HEP.

## 2018-09-28 NOTE — PROGRESS NOTES
DAILY TREATMENT NOTE    DATE: 9/28/2018    Start Time:  0800  Stop Time:  0845    PROCEDURES:    TIMED  Procedure Min.   NMR 25   TE 20                 UNTIMED  Procedure Min.             Total Timed Minutes:  45  Total Timed Units:  3  Total Untimed Units:  0  Charges Billed/# of units:  3 (2NMR, 1TE)      Progress/Current Status    Subjective:     Patient ID: Serenity Powell is a 58 y.o. female.  Diagnosis:   1. Decreased functional mobility     2. Risk for falls     3. Gait abnormality       Pain: 0 /10  Pt stated that she wore the knee cage all day at work again yesterday with minimal use of the cane.    Objective:     Session initiated with Neuro re-ed and endurance training with B UE/LE extremities for reciprocal motion of all limbs on sci-fit x 8 min at level 3 at > or equal to 50 spm  w/o rest. Pt performed standing balance and standing TE per log and session ended with TE on leg press per log.        DATE 9/28/18 9/26/18 9/20/18 9/17/18 9/14/18 9/11/18   Visit 12 11 10 9 8 7     POC exp 10/1/18 NEXT!!!!!        FOTO next DONE 10/10 9/10 8/10 7/10   Standing feet apart 1' on foam // - -- - -- -   Standing feet together 1' on foam // - -- - -- -   Standing 1/2 tandem 1' on foam B // - -- - -- -   Standing tandem --- - -- - -- -   SLS R -- - -- - -- -   SLS L -- - -- - -- -   Side stepping -- - -- - -- -   Cross overs -- - -- - -- -   Beep board 1' // - -- - -- -   Dev sequencing -- - -- - -- -   SLR -- - -- - -- -   clamshells -- - -- - -- -   SAQ -- - -- - -- -   bridges -- - -- - -- -   Adduction isometric -- - -- - -- -   LAQ --- - -- - -- -   HS curls - - -- - -- -   HS stretch with strap -- - -- - -- -   Gastroc stretch -- - -- - -- -   Quad str next - -- - ---- -   Leg press 3 x 10 B 4.0   2 x 10 L 2.0 - -- - ---    Hip extension 2 x 15 L  w/ R standing on Block 2 x 12 L  w/ R standing on Block 2 x 12 L  w/ R standing on Block 2 x 10 L  w/ R standing on Block 2 x 10 L  w/ R standing on Block 2 x 10 L  w/  R standing on Block    Hip flexion 2 x 15 L  w/ R standing on Block 2 x 12 L  w/ R standing on Block 2 x 12 L  w/ R standing on Block 2 x 10 L  w/ R standing on Block 2 x 10 L  w/ R standing on Block  2 x 10 L  w/ R standing on Block    Hip Abduction 2 x 15 L  w/ R standing on Block 2 x 12 L  w/ R standing on Block 2 x 12 L  w/ R standing on Block 2 x 10 L  w/ R standing on Block 2 x 10 L  w/ R standing on Block 2 x 10 L  w/ R standing on Block    TKE 2x15 PTC 2x15 PTC 2x15 PTC 2x15 PTC next 2x10 PRC   Knee flexion -- - -- - -- -   Toe raises -- - -- - -- -   Heel raises -- - -- - -- -   Nu-step 8 min level 3  8 min level 2.5 8 min level 2.5 8' 2.5    Step ups TKE with L on L1 step and R toe tap up 2 x 10 TKE with L on L1 step and R toe tap up 2 x 10 TKE with L on L1 step and R toe tap up 2 x 10 TKE with L on L1 step and R toe tap up 2 x 10 TKE with L on L1 step and R toe tap up 2 x 10    Lateral step ups --  -- - --    Gait --  -- - --    INITIALS [facility] 1/6 FS  1/6 FS  2/6       Assessment:     Pt is now able to ambulate with less hyperextension on the L with knee cage blocking L popliteal area with and without cane safely.  PT will plan to reassess for possible d/c next session.     Patient Education/Response:     CONT HEP    Plans and Goals:     Cont to advance PT as per POC. Cont gait training with left knee cage and Quad cane.   GOALS:   pt agrees to goals set.  1. Pt will have completed a consult with an orthotist for a knee brace to address her L knee hyperextension.  2. Pt will improve efficiency with gait pattern with decreased vaulting and circumduction through improved LE strength and coordination.  3. Pt will report knee pain at worst as </= 3/10.  4. Pt will decrease TUG score to </= 13 seconds in order to increase performance with community mobility.  5. Pt will increase Dang Balance score to >/= 50/56 in order to decrease fall risk.  6. Pt will be able to ambulate 600 ft in 6 minutes walk test  with least restrictive device without LOB with knee cage on.  7. Pt will be Indep with HEP.

## 2018-10-01 NOTE — PROGRESS NOTES
DAILY TREATMENT NOTE    DATE: 10/1/2018    Start Time:  0800  Stop Time:  0845    PROCEDURES:    TIMED  Procedure Min.       TE 25   Gait 10   MT 10         UNTIMED  Procedure Min.             Total Timed Minutes:  45  Total Timed Units:  3  Total Untimed Units:  0  Charges Billed/# of units:  3 (1MT, 1NMR, 1TE)      Progress/Current Status    Subjective:     Patient ID: Serenity Powell is a 58 y.o. female.  Diagnosis:   1. Decreased functional mobility     2. Risk for falls     3. Gait abnormality       Pain: 4 /10  Pt stated that her L knee was hurting and sore this am due to more standing on yesterday than usual at a birthday party.      Objective:     Session initiated with FOTO    DATE 10/1/18 9/28/18 9/26/18 9/20/18 9/17/18 9/14/18 9/11/18   Visit 13 12 11 10 9 8 7     POC exp 10/1/18 DONE d/c NEXT!!!!!        FOTO done next DONE 10/10 9/10 8/10 7/10   Standing feet apart -- 1' on foam // - -- - -- -   Standing feet together -- 1' on foam // - -- - -- -   Standing 1/2 tandem -- 1' on foam B // - -- - -- -   Standing tandem - --- - -- - -- -   SLS R -- -- - -- - -- -   SLS L -- -- - -- - -- -   Side stepping -- -- - -- - -- -   Cross overs -- -- - -- - -- -   Beep board -- 1' // - -- - -- -   Dev sequencing -- -- - -- - -- -   SLR -- -- - -- - -- -   clamshells - -- - -- - -- -   SAQ -- -- - -- - -- -   bridges -- -- - -- - -- -   Adduction isometric -- -- - -- - -- -   LAQ -- --- - -- - -- -   HS curls -- - - -- - -- -   HS stretch with strap -- -- - -- - -- -   Gastroc stretch -- -- - -- - -- -   Quad str -- next - -- - ---- -   Leg press -- 3 x 10 B 4.0   2 x 10 L 2.0 - -- - ---    Hip extension -- 2 x 15 L  w/ R standing on Block 2 x 12 L  w/ R standing on Block 2 x 12 L  w/ R standing on Block 2 x 10 L  w/ R standing on Block 2 x 10 L  w/ R standing on Block 2 x 10 L  w/ R standing on Block    Hip flexion -- 2 x 15 L  w/ R standing on Block 2 x 12 L  w/ R standing on Block 2 x 12 L  w/ R standing on Block 2  x 10 L  w/ R standing on Block 2 x 10 L  w/ R standing on Block  2 x 10 L  w/ R standing on Block    Hip Abduction -- 2 x 15 L  w/ R standing on Block 2 x 12 L  w/ R standing on Block 2 x 12 L  w/ R standing on Block 2 x 10 L  w/ R standing on Block 2 x 10 L  w/ R standing on Block 2 x 10 L  w/ R standing on Block    TKE -- 2x15 PTC 2x15 PTC 2x15 PTC 2x15 PTC next 2x10 PRC   Knee flexion -- -- - -- - -- -   Toe raises -- -- - -- - -- -   Heel raises -- -- - -- - -- -   Nu-step -- 8 min level 3  8 min level 2.5 8 min level 2.5 8' 2.5    Step ups -- TKE with L on L1 step and R toe tap up 2 x 10 TKE with L on L1 step and R toe tap up 2 x 10 TKE with L on L1 step and R toe tap up 2 x 10 TKE with L on L1 step and R toe tap up 2 x 10 TKE with L on L1 step and R toe tap up 2 x 10    Lateral step ups -- --  -- - --    Gait -- --  -- - --    INITIALS FS FS JA 1/6 FS JA 1/6 FS JA 2/6     Dang Balance Test    1. Sit to stand: 3  2. Standin  3. Sittin  4. Stand to sit: 3  5. Transfers: 3  6. Stand with EC: 3  7. Stand feet together: 4  8. Reaching: 3  9.  objects: 4  10.Turning to look: 4  11. 360 Turn: 3  12. Alternating step up: 1  13. Tandem: 2  14. SLS: 0    Total Score 41/56 low fall risk    6 minute walk with SPC: 706 ft with SPC without LOB without sitting or standing rest with knee cage on L knee    FOTO:58% impaired    TUG: 15 secs w/o cane   13 secs w/ cane low fall risk  Assessment:     See d/c summary    Patient Education/Response:     CONT HEP    Plans and Goals:     Cont to advance PT as per POC. Cont gait training with left knee cage and Quad cane.   GOALS:   pt agrees to goals set.  1. Pt will have completed a consult with an orthotist for a knee brace to address her L knee hyperextension. MET  2. Pt will improve efficiency with gait pattern with decreased vaulting and circumduction through improved LE strength and coordination. MET  3. Pt will report knee pain at worst as </= 3/10. MET by end of  session  4. Pt will decrease TUG score to </= 13 seconds in order to increase performance with community mobility. Met with SPC  5. Pt will increase Jimenez Balance score to >/= 50/56 in order to decrease fall risk. Not met without cane  6. Pt will be able to ambulate 600 ft in 6 minutes walk test with least restrictive device without LOB with knee cage on.  ft  7. Pt will be Indep with HEP.  Issued today    REHAB SERVICES OUTPATIENT DISCHARGE SUMMARY  Physical Therapy      Name:  Serenity DESAI Sherry  Date:  10/1/18  Date of Evaluation:  08/02/18  Physician:  Dylan  Total # Of Visits:  13  Cancelled:  0  No Shows:  0  Diagnosis:    1. Decreased functional mobility     2. Risk for falls     3. Gait abnormality         Physical/Functional Status:  At time of discharge, patient was able to improve gait with and without AD with less circumduction and R side vaulting and less traumatic hyperextension in stance with L knee cage and R heel lift.  Pt was also able to walk safely with and without AD with knee cage with pain 4/10 or less.  Pt's balance test and TUG stayed the same with addition of the knee cage.    The patient is to be discharged from our Therapy service for the following reason(s):  Patient has met all of his/her goals except jimenez balance     Degree of Goal Achievement:  Patient has met all goals except jimenez balance    Patient Education:  CONT updated HEP, wear knee cage to tolerance    Discharge Plan:  Home Program

## 2018-11-06 PROBLEM — A41.9 SEPSIS: Status: ACTIVE | Noted: 2018-01-01

## 2018-11-06 PROBLEM — M72.6 NECROTIZING FASCIITIS: Status: ACTIVE | Noted: 2018-01-01

## 2018-11-06 NOTE — TRANSFER OF CARE
"Anesthesia Transfer of Care Note    Patient: Serenity DESAI Angle    Procedure(s) Performed: Procedure(s) (LRB):  IRRIGATION AND DEBRIDEMENT, UPPER EXTREMITY (Left)    Patient location: ICU    Anesthesia Type: general    Transport from OR: Upon arrival to PACU/ICU, patient attached to ventilator and auscultated to confirm bilateral breath sounds and adequate TV. Transported from OR intubated on 100% O2 by AMBU with adequate controlled ventilation. Continuous ECG monitoring in transport. Continuous SpO2 monitoring in transport. Continuos invasive BP monitoring in transport    Post pain: adequate analgesia    Post assessment: no apparent anesthetic complications    Post vital signs: unstable (on levophed drip)    Level of consciousness: unresponsive (pt had decrease in GCS in ED)    Nausea/Vomiting: no nausea/vomiting    Complications: none    Transfer of care protocol was followedComments: Report given to DEEPT[facility]. Pt transported to ICU with ASA monitors and audible alarms. Levophed drip continued, discrepancy between cNIBP and IABP. Pt walked in thru the ED today, had a decrease in GCS and was emergently intubated by ED physician, pt immediately transferred to OR for fasciotomy.       Last vitals:   Visit Vitals  BP (!) 118/53 (BP Location: Right arm, Patient Position: Lying)   Pulse 97   Temp 35.9 °C (96.6 °F) (Core Esophageal)   Resp 16   Ht 5' 4" (1.626 m)   Wt 93.9 kg (207 lb)   LMP 01/01/1970   SpO2 100%   BMI 35.53 kg/m²     "

## 2018-11-06 NOTE — CONSULTS
"Pulmonary/Critical Care Consult Note    [facility] Resident HO-4 ICU Consult Note  Staff:  Dr. Carlson  Fellow: Dr. Arita  Resident: Franky Munroebob Powell is a 58 y.o. female with history of Multiple Sclerosis on Interferon, T2DM and HTN who is being consulted to the [facility]Critical [facility] service at [facility] for septic shock.     HPI: History is limited due to critical illness, as the patient is intubated and history is taken from the chart. Patient reportedly presented with left arm pain for about 4 days. Onset and character is unknown. Chart indicates she had worsening nausea/vomiting and diarrhea for about 3 days. Notes indicate she had worsening malaise. Per chart review, patient had a concerning looking extremity in the ED, and progression of hypotension. She was intubated and taken to the OR for suspected upper extremity infection.   ED workup showed a CT upper extremity with nonspecific cellulitis/fascitits without fluid collection or gas. Bicarb 7, Cr 3.7, Anion Gap 34, pH 6.97 and Lactic acid over 12. Patient taken to the OR where left upper extremity was open, explored and drains placed. She was moved to the ICU intubated, on vasopressors, in critical condition.      Objective:   Last 24 Hour Vital Signs:  BP  Min: 60/42  Max: 219/91  Temp  Av.7 °F (36.5 °C)  Min: 96.6 °F (35.9 °C)  Max: 98.6 °F (37 °C)  Pulse  Av.9  Min: 86  Max: 122  Resp  Av.6  Min: 16  Max: 37  SpO2  Av.2 %  Min: 88 %  Max: 100 %  Height  Av' 4" (162.6 cm)  Min: 5' 4" (162.6 cm)  Max: 5' 4" (162.6 cm)  Weight  Av.9 kg (207 lb)  Min: 93.9 kg (207 lb)  Max: 93.9 kg (207 lb)  No intake/output data recorded.    Ventilator Settings:  AC/VC, Vt 370, Rate 18, Peep 5    Physical Examination:  General: Patient is intubated, minimally responsive.   HEENT: No scleral icterus, conjunctiva clear. Oral cavity is moist. No oral or pharyngeal lesions noted.   Cardiac: " Regular rhythm, with tachycardic rate. No murmurs or rubs noted. S1 and S2, without S3 or S4.   Pulmonary: Lungs are diminished bilaterally but clear to auscultation bilaterally. No wheeze, rales or rhonchi noted. No clubbing.   Abdomen: Obese abdomen, bowel sounds present. Soft, non-tender to palpation. No rigidity or guarding.   Extremities: L upper extremity post op dressing in place. LE mottling noted. No pedal edema. No cyanosis. Capillary refill less than 2 seconds in fingertips.   Skin: No rashes noted. Skin is cool and dry.   Neuro: Patient intubated, RASS>-2, minimally responsive.     Laboratory:  Laboratory Studies:   Recent Labs   Lab 11/06/18  1511   PH 7.002*   PCO2 41.4   PO2 128*   HCO3 10.3*   POCSATURATED 97   BE -21     Recent Labs   Lab 11/06/18  0920  11/06/18  1511   WBC 4.85  --   --    RBC 4.29  --   --    HGB 13.2  --   --    HCT 40.5   < > 22*     --   --    MCV 94  --   --    MCH 30.8  --   --    MCHC 32.6  --   --     < > = values in this interval not displayed.     Recent Labs   Lab 11/06/18  0920      K 3.6   CL 95   CO2 7*   BUN 43*   CREATININE 3.7*   MG 2.1       Microbiology Data:  BCx: 11/6 pending  UCx: 11/6 pending  Left Arm Op cultures 11/6: Pending    Arterial Blood Gases:  ABG 15:11 11/6- 7.002 / 41 / 128 / 10.3 on [facility] [place]    Radiology Data:  Pertinent Findings:  CT Upper extremity: Findings concerning for cellulitis/fascititis without abscess or gas.     CXR: no acute cardiopulmonary process.     Current Medications:     Infusions:   dexmedetomidine (PRECEDEX) infusion      norepinephrine bitartrate-D5W 0.3 mcg/kg/min (11/06/18 1513)    custom IV infusion builder      vasopressin (PITRESSIN) infusion          Scheduled:   clindamycin (CLEOCIN) IVPB  900 mg Intravenous Q8H    [START ON 11/7/2018] fludrocortisone  100 mcg Oral Daily    heparin (porcine)  5,000 Units Subcutaneous Q8H    hydrocortisone sodium succinate  100 mg Intravenous Q8H    norepinephrine         [START ON 11/7/2018] pantoprazole  40 mg Intravenous Daily    piperacillin-tazobactam (ZOSYN) IVPB  2.25 g Intravenous Q8H    vancomycin (VANCOCIN) IVPB  1,000 mg Intravenous Q24H        PRN:  dextrose 50%, glucagon (human recombinant), insulin aspart U-100        Assessment/Plan:     Patient is a 57yo F with history of Multiple Sclerosis on Interferon, T2DM and HTN who is being consulted to the [facility]Critical [facility] service at [facility] for septic shock. History was limited, but patient presented with 4-5 days of L arm pain and nausea/vomiting/diarrhea. She presented to the ED 11/6 with significant acidosis, and hypotension. She was taken to the OR for concern for upper extremity fasciitis, and was transferred to the ICU hypotensive on Levofed, and intubated.       Neuro/CNS:  History of Multiple Sclerosis  -Per chart review, patient with above history on Interferon.   -Per neuro note 7/11/18, patient only deficits are tingling in bilateral upper extremities and possibly a gait disturbance managed with a cane.  -Will follow.    Acute Encephalopathy, suspect septic encephalopathy  -Patient with minimal responsiveness pre and post operative per reports with minimal sedation reportedly used.  -CT head without acute findings.   -Suspect septic encephalopathy, will follow for clearance.     Cardiovascular:  Septic Shock 2/2 Left Upper Extremity Necrotizing Fasciitis with Unspecified Organism.   -Patient with multiple days of upper extremity pain, presented with reported skin findings and hypotension, taken to the OR on 11/6 for concern for nec fasc. Lactate over 12 on arrival.   -Patient with reported dusky fluid in the extremity, with drains placed.   -Patient hypotensive, requiring levofed and vasopressin added with significant acidosis.  -Bicarb drip made with 1/2 NS and bicarb due to Hx diabetes and reduced EF on bedside echo.   -Upper extremity suspected source of sepsis. Other  sources of infection on CT scan abdomen were stranding around kidneys which could be non-specific, and mild ground glass opacities in pulmonary area, though my suspicion is low given appearance of upper extremity.   -Broad spectrum antibiotics with Vanc/Zosyn/Clinda for nec fasc.   -Trend lactics Q6H  -Titrate vasopressor support to MAP>65.     -Echo: Official echo ordered.    Respiratory:  Acute Respiratory Failure 2/2 Septic Shock  -Patient intubated with significant acidosis and septic shock.  -CXR largely unremarkable, CT chest commented on some ground glass opacities, but she does not have a significant oxygen requirement at this time.  -Conservative oxygen strategy, low tidal volume strategy and oral care. Daily SAT/SBT.     Tobacco Abuse Hx  -Per chart review, 3/4ppd  -CXR and CT do not have significant obstructive findings.     Gastrointestinal / Fluids, Electrolytes, and Nutrition  Transaminitis, suspect Shock Liver  -AST and ALT over 1000, suspect secondary to shock.   -Will trend, and check US abdomen when more clinically stable.   -Will send acetaminophen and hep panel for workup.     Renal:  PERLA  -Patient with Cr 3.8 and GFR 14, with baseline 1.0  -Suspect 2/2 sepsis. Will send urine studies.  -IV hydration underway with bicarb drip.   -CT scan showed no evidence of hydronephrosis or obstructing stones.   -Renally dose medications. Aware that Vanc/Zosyn combination may make PERLA worse, but we prefer this coverage given her nec fasc.    Metabolic Acidosis 2/2 Lactic acidosis  -CO2 7 on arrival with AG 32 and lactic acid over 12.   -Delta gap is 27, negating other acidosis.   -Suspect 2/2 lactic acidosis due to sepsis as above, treatment ongoing  -Bicarb drip in place, patient s/p bicarb amps in the OR.   -Will follow.     Infectious Disease:  Septic Shock, as above  -cultures pending, broad spectrum antibiotics running. See CV section.     Hematology/Oncology:  Normocytic Anemia  -Will follow and check  workup when more stable  -Normally she has normal H/H.     Thrombocytopenia  -Platelets 110, will follow closely, likely related to sepsis.   -Normal platelets for her are in the 220-280s    Endocrine:  T2DM   -Per chart review, will check a1c when more stable.   -Sliding scale and accuchecks     Psychiatric:  -No known issues. Will follow    Prophylaxis: heparin, protonix    Code Status:Full    Disposition:  Critically ill, ICU care for intubation and vasopressor requirement.     Franky Dillon  [facility]        Thank you for consulting our service to participate in the care of this most interesting patient. Case to be discussed with Pulmonary/Critical Care staff, and attestation is pending.

## 2018-11-06 NOTE — ED PROVIDER NOTES
Encounter Date: 2018    SCRIBE #1 NOTE: I, Gogo Odell, am scribing for, and in the presence of,  Dr. Tejada. I have scribed the following portions of the note - Other sections scribed: critical care note, central line note, intubation note.       History     Chief Complaint   Patient presents with    Nausea     n/v/d since . deneis abd pain. reports left arm pain. reports left arm swelling yesterday.     Vomiting    Diarrhea     The history is provided by the patient.        A 57 yo F  has a past medical history of Diabetes mellitus A1c 6.6 on , Hypertension, Multiple sclerosis, and Stroke presents with nausea, bilious vomiting, and watery diarrhea that started on . She had a 102F fever on Saturday with recurrent fevers . She reported a dry cough and no chest pain or SOB. Her only pain is her left arm that started Saturday that began to swell on . She also notes some tongue swelling and trouble swallowing but denies SOB. She denied dysuria, frequency, headache, neck stiffness, and abdominal pain. She was walking around on Friday and went to a football game but felt fatigued, weak and stayed in bed Saturday after feeling malaise. She had a cholecystectomy in the past. Her son brought her in today. They did not check her sugars recently. Denies alcohol use and is a smoker.      Review of patient's allergies indicates:  No Known Allergies  Past Medical History:   Diagnosis Date    Diabetes mellitus     Hypertension     Multiple sclerosis     Stroke      Past Surgical History:   Procedure Laterality Date     SECTION, CLASSIC      CHOLECYSTECTOMY      CHOLECYSTECTOMY, LAPAROSCOPIC N/A 2013    Performed by Lainey Albarran MD at [facility] OR    COLONOSCOPY N/A 2016    Procedure: COLONOSCOPY;  Surgeon: Chase Wise MD;  Location: [facility];  Service: Endoscopy;  Laterality: N/A;    COLONOSCOPY N/A 2016    Performed by Chase NICHOLS  MD Billie at [facility] ENDO    HYSTERECTOMY       No family history on file.  Social History     Tobacco Use    Smoking status: Current Every Day Smoker     Packs/day: 0.75     Years: 30.00     Pack years: 22.50    Smokeless tobacco: Never Used   Substance Use Topics    Alcohol use: No     Alcohol/week: 0.0 oz    Drug use: No     Review of Systems   Constitutional: Positive for fatigue and fever. Negative for activity change and appetite change.   HENT: Positive for congestion, facial swelling and sore throat.    Eyes: Negative.    Respiratory: Positive for cough.    Cardiovascular: Negative.  Negative for chest pain and palpitations.   Gastrointestinal: Positive for diarrhea and nausea. Negative for abdominal distention, abdominal pain, anal bleeding and blood in stool.   Endocrine: Negative.    Genitourinary: Negative for dysuria, flank pain, frequency and hematuria.   Musculoskeletal: Positive for joint swelling.   Neurological: Positive for weakness. Negative for dizziness, seizures and numbness.   Psychiatric/Behavioral: The patient is nervous/anxious.        Physical Exam     Initial Vitals [11/06/18 0854]   BP Pulse Resp Temp SpO2   125/74 (!) 121 (!) 22 98.6 °F (37 °C) 99 %      MAP       --         Physical Exam    Nursing note and vitals reviewed.  Constitutional: She appears well-developed and well-nourished. She appears ill.   HENT:   Head: Normocephalic and atraumatic.   Right Ear: External ear normal.   Left Ear: External ear normal.   Nose: Nose normal.   Mouth/Throat: Mucous membranes are dry. No trismus in the jaw. No oropharyngeal exudate, posterior oropharyngeal edema, posterior oropharyngeal erythema or tonsillar abscesses.   No oral or tongue swelling    Eyes: EOM are normal. Right eye exhibits no discharge. Left eye exhibits no discharge.   Anisocoria: left pupil > right     Neck: Neck supple. No JVD present.   Cardiovascular: Regular rhythm and intact distal pulses. Exam reveals no  friction rub.    No murmur heard.  Tachycardic   Pulmonary/Chest: Breath sounds normal. No respiratory distress. She has no wheezes.   Abdominal: Soft. Bowel sounds are normal. She exhibits no distension and no mass. There is no tenderness. There is no rebound and no guarding.   obese   Musculoskeletal: She exhibits edema and tenderness.   Edema and swelling in the entire left arm. Distal pulse weaker in left hand. Pain in left arm worse with movement.     Neurological: She is alert and oriented to person, place, and time. No cranial nerve deficit or sensory deficit.   Skin: There is pallor.   Skin is mottled         ED Course   Critical Care  Date/Time: 11/6/2018 11:37 AM  Performed by: Magy Tejada MD  Authorized by: Magy Tejada MD   Direct patient critical care time: 20 minutes  Additional history critical care time: 10 minutes  Ordering / reviewing critical care time: 10 minutes  Documentation critical care time: 10 minutes  Consulting other physicians critical care time: 10 minutes  Total critical care time (exclusive of procedural time) : 60 minutes  Critical care time was exclusive of separately billable procedures and treating other patients and teaching time.  Critical care was necessary to treat or prevent imminent or life-threatening deterioration of the following conditions: metabolic crisis, sepsis and dehydration.  Critical care was time spent personally by me on the following activities: discussions with consultants, development of treatment plan with patient or surrogate, interpretation of cardiac output measurements, evaluation of patient's response to treatment, examination of patient, obtaining history from patient or surrogate, ordering and performing treatments and interventions, ordering and review of laboratory studies, ordering and review of radiographic studies, re-evaluation of patient's condition, review of old charts, pulse oximetry and ventilator management.    Central  Line  Date/Time: 11/6/2018 11:09 AM  Performed by: Magy Tejada MD  Consent Done: Emergent Situation  Indications: hemodynamic monitoring  Preparation: skin prepped with ChloraPrep  Skin prep agent dried: skin prep agent completely dried prior to procedure  Sterile barriers: all five maximum sterile barriers used - cap, mask, sterile gown, sterile gloves, and large sterile sheet  Hand hygiene: hand hygiene performed prior to central venous catheter insertion  Location details: right internal jugular  Catheter type: triple lumen  Catheter size: 8.5 Fr  Ultrasound guidance: yes  , compressibility normal  Vascular Doppler: not done  Needle advanced into vessel with real time Ultrasound guidance.  Guidewire confirmed in vessel.  Sterile sheath used.  Manometry: No   Number of attempts: 1  Assessment: placement verified by x-ray  Complications: none  Specimens: No  Implants: No  Post-procedure: line sutured  Complications: No    Intubation  Date/Time: 11/6/2018 12:59 PM  Performed by: Magy Tejada MD  Authorized by: Magy Tejada MD   Consent Done: Emergent Situation  Indications: airway protection  Intubation method: direct  Patient status: sedated  Preoxygenation: bag valve mask  Sedatives: etomidate  Paralytic: rocuronium  Laryngoscope size: Mac 4  Tube size: 6.5 mm  Tube type: cuffed  Number of attempts: 1  Cricoid pressure: yes  Cords visualized: yes  Post-procedure assessment: chest rise  Breath sounds: clear  Cuff inflated: yes  ETT to lip: 23 cm  Tube secured with: ETT jeff  Chest x-ray interpreted by me and radiologist.  Complications: No  Specimens: No  Implants: No        Labs Reviewed   URINALYSIS, REFLEX TO URINE CULTURE - Abnormal; Notable for the following components:       Result Value    Appearance, UA Cloudy (*)     Specific Gravity, UA >=1.030 (*)     Protein, UA 2+ (*)     Ketones, UA Trace (*)     Occult Blood UA 2+ (*)     Leukocytes, UA 1+ (*)     All other components within  normal limits    Narrative:     Preferred Collection Type->Urine, Clean Catch   COMPREHENSIVE METABOLIC PANEL - Abnormal; Notable for the following components:    CO2 7 (*)     Glucose 308 (*)     BUN, Bld 43 (*)     Creatinine 3.7 (*)     Albumin 2.3 (*)      (*)     Anion Gap 34 (*)     eGFR if  15 (*)     eGFR if non  13 (*)     All other components within normal limits    Narrative:      CO2  critical result(s) called and verbal readback obtained from   Zo Soria RN, 11/06/2018 10:23   CBC W/ AUTO DIFFERENTIAL - Abnormal; Notable for the following components:    Lymph # 0.6 (*)     Gran% 79.3 (*)     Lymph% 11.5 (*)     All other components within normal limits   LACTIC ACID, PLASMA - Abnormal; Notable for the following components:    Lactate (Lactic Acid) >12.0 (*)     All other components within normal limits    Narrative:      LACT  critical result(s) called and verbal readback obtained from   Shannan Mendez RN, 11/06/2018 10:13   SEDIMENTATION RATE - Abnormal; Notable for the following components:    Sed Rate 95 (*)     All other components within normal limits   C-REACTIVE PROTEIN - Abnormal; Notable for the following components:    .7 (*)     All other components within normal limits   URINALYSIS MICROSCOPIC - Abnormal; Notable for the following components:    RBC, UA 10 (*)     WBC, UA 15 (*)     WBC Clumps, UA Occasional (*)     Bacteria, UA Many (*)     All other components within normal limits    Narrative:     Preferred Collection Type->Urine, Clean Catch   POCT GLUCOSE - Abnormal; Notable for the following components:    POCT Glucose 298 (*)     All other components within normal limits   ISTAT CREATININE - Abnormal; Notable for the following components:    POC Creatinine 3.6 (*)     All other components within normal limits   ISTAT PROCEDURE - Abnormal; Notable for the following components:    POC PH 6.957 (*)     POC PCO2 18.6 (*)     POC  PO2 45 (*)     POC HCO3 4.1 (*)     POC SATURATED O2 55 (*)     POC TCO2 <5 (*)     All other components within normal limits   ISTAT PROCEDURE - Abnormal; Notable for the following components:    POC PH 6.700 (*)     POC PCO2 74.6 (*)     POC PO2 89 (*)     POC HCO3 9.2 (*)     POC SATURATED O2 78 (*)     POC TCO2 11 (*)     All other components within normal limits   CULTURE, URINE   LIPASE   MAGNESIUM   TSH   INFLUENZA A AND B ANTIGEN   DRUG SCREEN PANEL, URINE EMERGENCY    Narrative:     Preferred Collection Type->Urine, Clean Catch   BETA - HYDROXYBUTYRATE, SERUM   C-REACTIVE PROTEIN   SEDIMENTATION RATE      Us left arm No thrombus in central veins of the left upper extremity. Edema versus cellulitis of the subcutaneous soft tissues.    Imaging Results          CT Arm Elbow Without Contrast Left (Final result)  Result time 11/06/18 12:21:18    Final result by Kerwin Nice MD (11/06/18 12:21:18)                 Impression:      Left upper extremity suspected diffuse nonspecific cellulitis/fasciitis, in this patient with history of sepsis.  No organized fluid collection to suggest drainable abscess or gas foci to suggest necrotizing fasciitis at this time, and no acute or destructive osseous process seen.      Electronically signed by: Kerwin Nice MD  Date:    11/06/2018  Time:    12:21             Narrative:    EXAMINATION:  CT ARM ELBOW WITHOUT CONTRAST LEFT    CLINICAL HISTORY:  Elbow pain, no xray;Elbow erythema, swelling, cellulitis suspected;sepsis left arm swelling tense and erythematous.;    TECHNIQUE:  Axial images were obtained through the left elbow without intravenous contrast.    COMPARISON:  Left upper extremity venous upper ultrasound and chest radiograph earlier same day.    FINDINGS:  Please note that lack of intravenous contrast limits evaluation of soft tissue and vascular structures.  Study is somewhat limited by patient motion artifact secondary to poor patient cooperation.  Bones are  well mineralized.  Overall alignment is within normal limits.  No displaced fracture, dislocation or destructive osseous process definitively seen.  Minimal degenerative change noted about the elbow.  3 mm sclerotic focus with angular margins at the capitellum, likely a bone island.  No large elbow joint effusion definitively seen.    There is diffuse stranding of the subcutaneous fat of the imaged left upper extremity beginning at the level of the proximal humerus extending through the elbow and along the imaged forearm.  There is associated mild overlying skin thickening suggesting induration as well as diffuse thickening of the associated muscular fascia.  No subcutaneous emphysema or radiodense retained foreign body.  Included musculature is grossly intact allowing for lack of intravenous contrast.  No organized fluid collection identified to suggest drainable abscess.  No discrete soft tissue mass seen.                               CT Chest Abdoment Pelvis Without Contrast (XPD) (Final result)  Result time 11/06/18 12:18:11    Final result by Joseph Lisa MD (11/06/18 12:18:11)                 Impression:      1.  Mild perinephric stranding which is non specific.  This could relate to ascending infection such as pyelonephritis.  Correlation with urinalysis is advised.    2.  Scattered ground glass opacities which could reflect edema or pneumonitis.    3.  0.5 cm left pulmonary nodule.  For a solid nodule <6 mm, Fleischner Society 2017 guidelines recommend no routine follow up for a low risk patient, or follow-up with non-contrast chest CT at 12 months in a high risk patient.    4.   See body of report for details and multiple incidental findings.      Electronically signed by: Joseph Lisa MD  Date:    11/06/2018  Time:    12:18             Narrative:    EXAMINATION:  CT CHEST ABDOMEN PELVIS WITHOUT CONTRAST(XPD)    CLINICAL HISTORY:  Sepsis;    TECHNIQUE:  Low dose axial images, sagittal and coronal  reformations were obtained from the thoracic inlet to the pubic synthesis following the oral administration of 30 mL of Omnipaque 350. No IV contrast was administered.    COMPARISON:  12/11/2015.    FINDINGS:  Thoracic soft tissues: No significant abnormality. There is a right neck CVP line with tip in the distal SVC.    Aorta: Normal in course and caliber, with mild atherosclerotic plaque in the thoracic portion.  There are three branching vessels at the arch.    Heart: Normal in size. No pericardial effusion. There are severe coronary calcifications.    Susi/Mediastinum: No significant lymphadenopathy noting limited evaluation due to lack of IV contrast.    Lungs: Well aerated, without consolidation or pleural fluid. No pneumothorax.  There is a 0.5 cm pulmonary nodule in the apicoposterior segment of the left upper lobe.  There are scattered faint ground glass opacities centrally distributed which could relate to edema or pneumonitis. Airway is patent.  No bronchiectasis or endobronchial filling defect.    Liver: Evaluation is limited due to lack of IV contrast.  Additional streak artifact from patient arm positioning further limits evaluation.  Normal in size and attenuation, with no focal hepatic lesions.    Gallbladder: Cholecystectomy clips noted in the gallbladder fossa.    Bile Ducts: No evidence of dilated ducts.    Pancreas: No mass or peripancreatic fat stranding.    Spleen: Unremarkable.    Adrenals: There are bilateral adrenal nodules which are similar to the prior study.  The Hounsfield Units are under 20 suggesting they likely represent adenomas.    Kidneys/ Ureters: Normal in size and location. No hydronephrosis. No ureteral dilatation. There is a 0.6 cm midpole non obstructing renal calculus.  There is a left renal cyst measuring 1.7 cm.  Mild perinephric stranding noted bilaterally which is nonspecific.  Ascending infection such as pyelonephritis not excluded.  Correlation with urinalysis is  advised.  Ureters are normal in course to the urinary bladder.    Bladder: No evidence of wall thickening.    Reproductive organs: Patient underwent hysterectomy.  Ovaries appear unremarkable.    GI Tract/Mesentery: Esophagus is fluid filled.  Stomach is also distended with fluid.  This is nonspecific.  Small bowel and appendix are unremarkable.  No bowel obstruction or inflammation.  No ascites or free air.    Peritoneal Space: No ascites. No free air.    Retroperitoneum: No significant adenopathy.    Abdominal wall: Small fat containing umbilical hernia.    Vasculature: There is severe dominant aortic atherosclerosis but no aneurysm.    Bones: No acute fracture. Remote right sided rib fractures.    Other: Subcutaneous soft tissues are unremarkable.                               CT Head Without Contrast (Final result)  Result time 11/06/18 11:47:53    Final result by Joseph Lisa MD (11/06/18 11:47:53)                 Impression:      No acute abnormality.  Evaluation for meningitis is limited due to lack of IV contrast.  Consider lumbar puncture for CSF characterization if concern for meningitis.      Electronically signed by: Joseph Lisa MD  Date:    11/06/2018  Time:    11:47             Narrative:    EXAMINATION:  CT HEAD WITHOUT CONTRAST    CLINICAL HISTORY:  sepsis look for meningitis;    TECHNIQUE:  Low dose axial CT images obtained throughout the head without intravenous contrast. Sagittal and coronal reconstructions were performed.    COMPARISON:  04/24/2018    FINDINGS:  Intracranial compartment:    Ventricles and sulci are normal in size for age without evidence of hydrocephalus. No extra-axial blood or fluid collections.    The brain parenchyma demonstrates supratentorial white matter hypoattenuation is noted on the prior study..  No parenchymal mass, hemorrhage, edema or major vascular distribution infarct.    Skull/extracranial contents (limited evaluation): No fracture. Mastoid air cells and  paranasal sinuses are essentially clear.                               X-Ray Chest 1 View (Final result)  Result time 11/06/18 11:35:00   Procedure changed from X-Ray Chest PA And Lateral     Final result by Caleb Chan MD (11/06/18 11:35:00)                 Impression:      As above.      Electronically signed by: Caleb Chan MD  Date:    11/06/2018  Time:    11:35             Narrative:    EXAMINATION:  XR CHEST 1 VIEW    CLINICAL HISTORY:  central line placement; Encounter for adjustment and management of vascular access device    TECHNIQUE:  Single frontal view of the chest was performed.    COMPARISON:  11/06/2018    FINDINGS:  Right IJ catheter terminates in the SVC.  No consolidation, pleural effusion or pneumothorax.  Cardiomediastinal silhouette is unremarkable.                               US Upper Extremity Veins Left (Final result)  Result time 11/06/18 10:28:32   Procedure changed from US Upper Extremity Arteries Left     Final result by Joseph Lisa MD (11/06/18 10:28:32)                 Impression:      No thrombus in central veins of the left upper extremity.    Edema versus cellulitis of the subcutaneous soft tissues.      Electronically signed by: Joseph Lisa MD  Date:    11/06/2018  Time:    10:28             Narrative:    EXAMINATION:  US UPPER EXTREMITY VEINS LEFT    CLINICAL HISTORY:  pain/ swelling; Pain in arm, unspecified    TECHNIQUE:  Duplex and color flow Doppler evaluation and dynamic compression was performed of the left upper extremity veins.    COMPARISON:  None    FINDINGS:  Central veins: The internal jugular, subclavian, and axillary veins are patent and free of thrombus.    Arm veins: The brachial, basilic, and cephalic veins are patent and compressible.    Contralateral subclavian/internal jugular veins: The right subclavian and internal jugular veins are patent and free of thrombus.    Other findings: There is swelling of the subcutaneous soft tissues..                                X-Ray Abdomen Portable (Final result)  Result time 11/06/18 10:05:50   Procedure changed from X-Ray Abdomen Flat And Erect     Final result by Joseph Lisa MD (11/06/18 10:05:50)                 Impression:      Nonobstructing nephrolithiasis.  Otherwise no acute abdominal process.      Electronically signed by: Joseph Lisa MD  Date:    11/06/2018  Time:    10:05             Narrative:    EXAMINATION:  XR ABDOMEN PORTABLE    CLINICAL HISTORY:  Vomitting;  Vomiting, unspecified    TECHNIQUE:  Frontal view of the abdomen was obtained.    COMPARISON:  12/11/2015.    FINDINGS:  There is paucity of bowel gas.  No dilated loops.  No evidence for free air.  There are clips in the upper abdomen, likely from prior cholecystectomy.  No acute osseous abnormality.  There is evidence for nonobstructing nephrolithiasis on the right.                               X-Ray Chest AP Portable (Final result)  Result time 11/06/18 10:01:14    Final result by Joseph Lisa MD (11/06/18 10:01:14)                 Impression:      No acute cardiopulmonary process.      Electronically signed by: Joseph Lisa MD  Date:    11/06/2018  Time:    10:01             Narrative:    EXAMINATION:  XR CHEST AP PORTABLE    CLINICAL HISTORY:  tachycardia;    TECHNIQUE:  Single frontal view of the chest was performed.    COMPARISON:  None    FINDINGS:  Trachea is patent.  Cardiac silhouette appears normal in size.  Lungs are well expanded and appear clear.  No effusion, pneumothorax or free air below the diaphragm.  Remote right rib fractures are identified.  No acute osseous abnormality.                              X-Rays:   Independently Interpreted Readings:   Other Readings:  CXR No consolidation and no cardiomegaly.     Abdomen Xray Nonobstructing nephrolithiasis otherwise unremarkable     Medical Decision Making:   Differential Diagnosis:    sepsis, necrotizing fascitis, gastroenteritis, pneumonia, meningitis, UTI,  Cellulitis,  DVT, vascular insufficiency, bursitis, septic arthritis, inflammatory arthritis, contusion, dermatitis  Clinical Tests:   Sepsis Perfusion Assessment: I attest, a sepsis perfusion exam was performed within 6 hours of Septic Shock presentation, following fluid resuscitation.  ED Management:  12:38 PM Case discussed with Dr. Jaime of General Surgery, will come to evaluate the patient    1:28 PM Case discussed with Dr. Jaime, states he is on his way down to see the patient    2:00 PM  General surgery at bedside.  Patient's skin still appears mottled with prolonged capillary refill. All extremities cool.  Patient remains hypotensive currently on levophed.  Tachycardia has improved.  Weak pulses throughout.  LUE radial pulse no longer palpable.  + edema to LUE.  She has received 30 cc/kg IVF bolus.              Attending Attestation:   Physician Attestation Statement for Resident:  As the supervising MD   Physician Attestation Statement: I have personally seen and examined this patient.   I agree with the above history. -:   As the supervising MD I agree with the above PE.    As the supervising MD I agree with the above treatment, course, plan, and disposition.   -: I performed all procedures.  Patient presented with sepsis.  Given broad spectrum abx, 30 cc/kg IVF bolus.  ED workup reveals LUE cellulitis vs fascitis.  Placed central line.  Patient required intubation for airway protection as her mental status declined during ED stay.  Shortly after intubation patient became hypotensive.  Levophed ordered and patient given push doses of 10 mcg of epinephrine prn hypotension.  General surgery consulted for emergent management of fascitis.  Family updated periodically during ED course.  All questions answered.  Dr. Jaime took patient to OR                    ED Course as of Nov 06 2131   Tue Nov 06, 2018   1005 EKG with sinus tachycardia, rate of 121 bpm.  Normal conduction, normal intervals.   No STEMI  [LD]   1152 Bedside ECHO  performed.  No pericardial effusion, normal appearing EF  [LD]   1240 Discussed case with Dr. Jaime with general surgery who will be down to see patient.   [LD]   1406 General surgery at bedside  [LD]      ED Course User Index  [LD] Magy Tejada MD     Clinical Impression:   1. Septic shock  2. Fascitis LUE  3. Respiratory failure  4. Lactic Acidosis  5. Transaminitis  6. PERLA  7. Altered Mental Status            Differential Dx includes sepsis, gastroenteritis, pneumonia, Cellulitis,  DVT, vascular insufficiency, bursitis, septic arthritis, inflammatory arthritis, contusion, dermatitis    Disposition:   Disposition: Admitted  Condition: Critical                         Magy Tejada MD  11/06/18 7617

## 2018-11-06 NOTE — PROGRESS NOTES
Pharmacist Renal Dose Adjustment Note    Serenity Powell is a 58 y.o. female being treated with the medication Zosyn    Patient Data:    Vital Signs (Most Recent):  Temp: 96.6 °F (35.9 °C) (11/06/18 1535)  Pulse: 88 (11/06/18 1654)  Resp: (!) 28 (11/06/18 1654)  BP: (!) 118/53(94/51 IABP) (11/06/18 1535)  SpO2: 95 % (11/06/18 1654)   Vital Signs (72h Range):  Temp:  [96.6 °F (35.9 °C)-98.6 °F (37 °C)]   Pulse:  []   Resp:  [16-37]   BP: ()/(31-91)   SpO2:  [88 %-100 %]      Recent Labs   Lab 11/06/18  0920 11/06/18  1605   CREATININE 3.7* 3.8*  3.8*     Serum creatinine: 3.8 mg/dL (H) 11/06/18 1605  Estimated creatinine clearance: 17.7 mL/min (A)    Medication Zosyn dose: 2.25 gm  frequency q8h will be changed to medication Zosyn dose 4.5 gm  frequency q12h infused over extended interval of four hours    Pharmacist's Name: Kaylyn Horton  Pharmacist's Extension: *[phone]**

## 2018-11-06 NOTE — PLAN OF CARE
Problem: Patient Care Overview  Goal: Plan of Care Review  Outcome: Ongoing (interventions implemented as appropriate)  Patient on  with documented settings.  Alarms are set and functioning with adequate volumes.  AMBU bag and mask at bedside.  Initiated at 13:15 11/6/2018. Will continue to monitor. 6.5ETT @  lip

## 2018-11-06 NOTE — PROGRESS NOTES
A 58-year-old diabetic female with sepsis, altered mental status, hypotension currently intubated on Levophed with left upper extremity edema pain concerning for infectious source.  Will take emergently back to the operating room for left upper extremity fasciotomy versus debridement.  Family updated on status an emergency consent obtained verbally.  Full consult note to follow

## 2018-11-06 NOTE — ED NOTES
Dr. Tejada at bedside. Pt is mottled in appearance. Pt is cool and clammy to touch. Pt c/o of swelling to tongue and left arm pain and swelling. Pt moved to Trauma room.

## 2018-11-06 NOTE — PROGRESS NOTES
11/06/18 1001   Critical Value Communication   Notified Physician/Designee Mallory NEVILLE   Date Result Received 11/06/18   Time Result Received 1001   Resulting Department of Critical Value Resp   Critical Test #1 PH   Critical Test #1 Result 6.95   Critical Test #2 PCO2   Critical Test #2 Result 19   Critical Test #3  PVO2   Critical Test #3 Result 45   Critical Test #4 BE   Critical Test #4 Result -28   Critical Test #5 HCO3   Critical Test #5 Result 4.1   Date Notified 11/06/18   Time Notified 1002   Read Back Verification Yes

## 2018-11-06 NOTE — OP NOTE
DATE OF PROCEDURE: 11/06/2018    PREOPERATIVE DIAGNOSIS:  Sepsis, left upper extremity next has fasciitis    POSTOPERATIVE DIAGNOSIS:  Same    PROCEDURE:  Left upper extremity incision and drainage, placement multiple Penrose    SURGEON: Mehran Jaime M.D    ASSISTANT: Edi Carlisle MD    ANESTHESIA: General endotracheal    ESTIMATED BLOOD LOSS:  30 cc    SPECIMEN:  Fluid for anaerobic and aerobic cultures    CONDITION:  Critical    COMPLICATIONS: None    FINDINGS:   1.  Turbid  fluid in the left upper arm and forearm  2.  No obvious tissue necrosis or nonviable tissue  3.  Multiple Penrose drains placed for drainage of the arm    INDICATIONS: The patient is a 58 y.o. female who presents to the ER with nausea vomiting and diarrhea for 2 days.  Patient also had left arm pain for the past 2 days progressively become more swollen and tender.  Workup of the skin in the ER the patient became altered and hypotensive.  She was intubated and started on vasopressor medications.  She is found have severe lactic acidosis of greater than 12.  CT of the abdomen pelvis and up left upper extremity was obtained.  No obvious intra-abdominal process present the left arm shows significant inflammation and fatty stranding.  Ultrasound was obtained which ruled out DVT.  Patient was seen and emergently taken operating room for incision and drainage of the left upper extremity for suspected sepsis.     PROCEDURE IN DETAIL:  This was an emergent procedure verbal consent was obtained from the patient's family.  Patient taken from the ER intubated in on Levophed in a critical condition.  She was brought to the operating room where she was transferred to the operating table.  A right radial arterial line was placed by Anesthesia. Fluid resuscitation was continued.  The patient received broad-spectrum antibiotics:  Vancomycin Zosyn and clindamycin.  The left arm was inspected and there is no palpable pulse at the time however  there is very weak pulses in the other distal extremities.  There is significant edema and mottling of the skin with some superficial blistering along the elbow and forearm.  Left arm was then prepped and draped typical sterile fashion. Began by making an approximately 10 cm incision in the lateral upper arm.  The subcutaneous fat was edematous and there was murky turbid  like fluid is present. We cultured this.  The tissue planes were easily dissected with blunt dissection using the finger along the fascial planes.  There is no abscess pocket.  Continued blunt dissection May multiple counter incisions for adequate drainage of the arm.  Main additional 10 cm incision along the lateral lower arm and medial lower arm.  A 7 cm incision is made in the medial upper arm and to 1 cm counter incisions were made in the axillary fossa and at the distal lateral aspect of the lower arm.  Throughout the operation we inspected the muscle bellies and the subcutaneous fat.  There is no obvious necrotic or nonviable tissue.  Minimal debridement was performed but wide drainage was.  Wound was then irrigated copiously with a jet lavaged for 3 L of saline. After the Penrose replace we packed the incisions with Kerlix.  Therefore separate pieces of Kerlix packed in the incisions. The patient remained critical during the case and required Levophed.  She made minimal urine output.  She was taken back to the ICU in critical condition.  All counts correct x2 than the case.    Dr. Carlisle provided assistance throughout the case and was present from the incision to Penrose drain placement..

## 2018-11-06 NOTE — H&P
[facility]  INPATIENT H&P    REASON FOR CONSULT/ADMISSION:  Left upper extremity necrotizing fasciitis, sepsis    HPI: Serenity Powell is a 58 y.o. female who presented to the ER earlier this morning with approximately 3 day history of fatigue, malaise, nausea, vomiting and diarrhea.  She also had began to have left arm pain which eventually developed edema starting yesterday.  She also reported fever 102 on Saturday with recurrent fevers Sunday Monday.  In the ER was initiated patient was found to have an edematous left upper extremity with pain and diffuse mottled skin. Laboratory values showed in a KI with a creatinine of 3.7 up from baseline of 1.2,  and lactic acid of greater than 12.  Imaging of the left upper extremity was obtained did not show any obvious fracture.  Ultrasound ruled out DVT.  Subsequently a CT scan was obtained of the head abdomen pelvis and of the left upper extremity.  There is mild perinephric stranding of the left kidney however UA was not convincing for UTI.  The left arm had significant edema but no obvious fluid collection or pneumatosis.  General surgery is consulted for evaluation of left upper extremity infection.  Between the time consult was placed in the time I arrived the patient became altered and hypotensive.  She subsequently was intubated and started on Levophed.  On exam the patient had diffuse mottled skin with cool extremities.  The left upper extremity was edematous and radial pulses not appreciated however all distal pulses were diminished.  Patient's abdomen was soft.  Patient had not received sedation since intubation and was not responding to stimulation.  Due to patient's deteriorating status elected to take the patient back emergently to the operating room for left upper extremity exploration.  Please see operative note for further details    Patient's family was interviewed as well.  They report the patient had vague left arm pain and  fatigue starting Saturday which slowly progressed become more edematous and painful.  At this time she is also suffering from the nausea vomiting and diarrhea.  They do not remember any recent trauma or injury to the left upper extremity.  They report she has diabetes hypertension and MS which she is on methotrexate for.    I have reviewed the patient's chart including prior progress notes, procedures and testing.     ROS:   Review of Systems   Unable to perform ROS: Intubated       PROBLEM LIST:  Patient Active Problem List   Diagnosis    Controlled type 2 diabetes mellitus with hyperglycemia, with long-term current use of insulin    Hypertension complicating diabetes    Multiple sclerosis    Old lacunar stroke without late effect    Gait disturbance    Vitamin D insufficiency    Encounter for long-term (current) use of high-risk medication    Left leg weakness    MDD (major depressive disorder), recurrent, in partial remission    Tobacco dependence    Hyperlipidemia associated with type 2 diabetes mellitus    Colon polyps    Internal hemorrhoid    Nuclear sclerosis    Adjustment disorder with depressed mood    Obesity, diabetes, and hypertension syndrome    PAD (peripheral artery disease)    Counseling regarding goals of care    Diabetes mellitus type 2 with peripheral artery disease    NAFLD (nonalcoholic fatty liver disease)    Decreased functional mobility    Risk for falls    Gait abnormality    Sepsis    Necrotizing fasciitis         HISTORY  Past Medical History:   Diagnosis Date    Diabetes mellitus     Hypertension     Multiple sclerosis     Stroke        Past Surgical History:   Procedure Laterality Date     SECTION, CLASSIC      CHOLECYSTECTOMY      CHOLECYSTECTOMY, LAPAROSCOPIC N/A 2013    Performed by Lainey Albarran MD at [facility] OR    COLONOSCOPY N/A 2016    Procedure: COLONOSCOPY;  Surgeon: Chase Wise MD;  Location: [facility] ENDO;   "Service: Endoscopy;  Laterality: N/A;    COLONOSCOPY N/A 1/28/2016    Performed by Chase Wise MD at [facility] ENDO    HYSTERECTOMY         Social History     Tobacco Use    Smoking status: Current Every Day Smoker     Packs/day: 0.75     Years: 30.00     Pack years: 22.50    Smokeless tobacco: Never Used   Substance Use Topics    Alcohol use: No     Alcohol/week: 0.0 oz    Drug use: No       No family history on file.      MEDS:  Current Facility-Administered Medications on File Prior to Encounter   Medication Dose Route Frequency Provider Last Rate Last Dose    bupivacaine 0.5% (5 mg/ml) injection    PRN Lainey Albarran MD   10 mL at 04/13/13 0755     Current Outpatient Medications on File Prior to Encounter   Medication Sig Dispense Refill    albuterol (ACCUNEB) 1.25 mg/3 mL Nebu Take 3 mLs (1.25 mg total) by nebulization every 6 (six) hours as needed (cough or chest tightness). Rescue 1 Box 0    aspirin 81 MG Chew Take 1 tablet (81 mg total) by mouth once daily.      atorvastatin (LIPITOR) 20 MG tablet Take 1 tablet (20 mg total) by mouth every evening. 90 tablet 2    BD LUER-LINO SYRINGE 3 mL 25 gauge x 1" Syrg 1 each by Misc.(Non-Drug; Combo Route) route every 30 days. Use with b-12 shot  1    BLOOD SUGAR DIAGNOSTIC (CONTOUR NEXT STRIPS MISC) 1 each by Misc.(Non-Drug; Combo Route) route 4 (four) times daily.       CALCIUM CARBONATE/VITAMIN D3 (VITAMIN D-3 ORAL) Take 5,000 Units by mouth once daily.       cyanocobalamin 1,000 mcg/mL injection Inject 1000mcg (1ml) IM daily for 1 week, once a week for 1 month, then once a month thereafter 10 mL 3    hydroCHLOROthiazide (HYDRODIURIL) 25 MG tablet TAKE 1 TABLET (25 MG TOTAL) BY MOUTH ONCE DAILY. 90 tablet 1    insulin aspart U-100 (NOVOLOG FLEXPEN U-100 INSULIN) 100 unit/mL InPn pen Units with meals: 8 breakfast / 6 lunch / 10 dinner + correction scale 18 mL 3    insulin glargine (LANTUS SOLOSTAR U-100 INSULIN) 100 unit/mL (3 mL) " "InPn pen Inject 40 Units into the skin every evening. Contact MD for any change +/- 4 units 36 mL 3    interferon beta-1a (AVONEX) 30 mcg/0.5 mL injection INJECT ONE SYRINGE (30 MCG) INTRAMUSCULARLY ONCE WEEKLY. REFRIGERATE.PROTECT FROM LIGHT. ALLOW TO WARM TO ROOM TEMPERATURE PRIOR TO USE. DO 3 kit 1    lancing device with lancets (ACCU-CHEK MULTICLIX LANCET) Kit 1 Box by Misc.(Non-Drug; Combo Route) route once daily. 1 each 0    lisinopril (PRINIVIL,ZESTRIL) 40 MG tablet TAKE 1 TABLET (40 MG TOTAL) BY MOUTH ONCE DAILY. 90 tablet 1    metFORMIN (GLUCOPHAGE) 1000 MG tablet TAKE 1 TABLET (1,000 MG TOTAL) BY MOUTH 2 (TWO) TIMES DAILY. 180 tablet 1    metoprolol tartrate (LOPRESSOR) 100 MG tablet Take 1 tablet (100 mg total) by mouth 2 (two) times daily. 180 tablet 3    pen needle, diabetic (BD INSULIN PEN NEEDLE UF MINI) 31 gauge x 3/16" Ndle 1 Box by Misc.(Non-Drug; Combo Route) route 3 (three) times daily. 90 each 3    syringe with needle, safety (INTEGRA SYRINGE) 1 mL 25 x 1" Syrg 25g 1in needle with 1cc syringes to be used for B12 injections. 12 Syringe 1    triamcinolone acetonide 0.1% (KENALOG) 0.1 % cream Apply topically 2 (two) times daily. for 10 days 80 g 0    venlafaxine (EFFEXOR-XR) 150 MG Cp24 TAKE 1 CAPSULE (150 MG TOTAL) BY MOUTH ONCE DAILY. 90 capsule 1       ALLERGIES:  Review of patient's allergies indicates:  No Known Allergies      VITALS:  Temp:  [96.6 °F (35.9 °C)-98.6 °F (37 °C)] 96.6 °F (35.9 °C)  Pulse:  [] 97  Resp:  [16-37] 16  SpO2:  [88 %-100 %] 100 %  BP: ()/(31-91) 118/53    No intake/output data recorded.      PHYSICAL EXAM:  Physical Exam   Constitutional: She appears well-developed and well-nourished.   Intubated and nonresponsive   HENT:   Head: Normocephalic and atraumatic.   ETT in place   Eyes: Conjunctivae are normal. No scleral icterus.   Left pupil is dilated approximately 5 mm but sluggishly reactive. Right pupil was 3 mm and reactive   Neck: Neck supple. " No tracheal deviation present. No thyromegaly present.   Cardiovascular: Normal heart sounds.   Tachycardic and hypertensive, distal pulses significantly diminished especially on the left   Pulmonary/Chest: She has no wheezes.   Intubated and ventilated, breath sounds equal   Abdominal: Soft. She exhibits no distension and no mass.   Musculoskeletal: She exhibits edema.   Diffuse left upper extremity edema the shoulder to the hand.  There is superficial ulceration over the ulcer and bursa and over the forearm.  Skin is mottled.  There is no obvious areas of fluctuance.  The radial pulse is not palpable.   Skin:   Diffuse mottling of the skin, cord duct         LABS:  Lab Results   Component Value Date    WBC 4.85 11/06/2018    RBC 2.96 (L) 11/06/2018    RBC 2.96 (L) 11/06/2018    HGB 9.1 (L) 11/06/2018    HGB 9.1 (L) 11/06/2018    HCT 28.6 (L) 11/06/2018    HCT 28.6 (L) 11/06/2018    HCT 22 (L) 11/06/2018     (L) 11/06/2018     (L) 11/06/2018     Lab Results   Component Value Date     (H) 11/06/2018     (H) 11/06/2018     11/06/2018     11/06/2018    K 5.4 (H) 11/06/2018    K 5.4 (H) 11/06/2018    CL 98 11/06/2018    CL 98 11/06/2018    CO2 7 (LL) 11/06/2018    CO2 7 (LL) 11/06/2018    BUN 43 (H) 11/06/2018    BUN 43 (H) 11/06/2018    CREATININE 3.8 (H) 11/06/2018    CREATININE 3.8 (H) 11/06/2018    CALCIUM 8.5 (L) 11/06/2018    CALCIUM 8.5 (L) 11/06/2018     Lab Results   Component Value Date    ALT 1,140 (H) 11/06/2018    ALT 1,140 (H) 11/06/2018    AST 1,568 (H) 11/06/2018    AST 1,568 (H) 11/06/2018    GGT 44 02/26/2004    ALKPHOS 67 11/06/2018    ALKPHOS 67 11/06/2018    BILITOT 0.7 11/06/2018    BILITOT 0.7 11/06/2018     Lab Results   Component Value Date    MG 2.8 (H) 11/06/2018    PHOS >15.0 (HH) 11/06/2018       STUDIES:  CT scan and ultrasound images and reports were personally reviewed.    - ultrasound reported as negative for DVT  - CT of the abdomen pelvis show  no obvious intra abdominal process specifically no intra-abdominal fluid collection or abscess, no free air, no obvious hernia, no diverticulitis, no appendicitis  - CT of left upper extremity showed diffuse edema but no obvious fluid collection.      ASSESSMENT & PLAN:  58 y.o. female with:    Necrotizing Fascitis LUE  - status post left upper extremity incision and drainage for source control  - continue broad-spectrum antibiotics with vancomycin, Zosyn and clindamycin.  Medications have been renally dosed will continue to follow vanc levels.  - will reassess wound for need for repeat incision and drainage approximately debridement of any necrotic tissue    Sepsis  - patient is in severe septic shock with multi-system organ failure.  - continue supportive measures with fluid resuscitation, vasopressor medications and broad-spectrum antibiotics.  - maintenance fluids with bicarb  - nephrology consulted for lactic acidosis and CRT T'd be initiated  - currently on Levophed and vasopressin.  Will add epinephrine additional agent as needed  - stress dose steroids ordered  - trend lactic acid, ABGs with pH, CPK    PERLA/hyperkalemia/hyperphosphatemia/lactic acidosis  - patient has severe PERLA with hyperkalemia, hyperphosphatemia in severe lactic acidosis  - CRRT will be initiated without to clear lactic acidosis  - left IJ dialysis catheter placed    Altered mental status/ Dilated Left Pupil  - concerning for ischemic event  - will consider further imaging if the patient stabilizes from a hemodynamic standpoint  - mental status changes may be secondary to sepsis and also medication administration.  Given patient's liver shock and renal failure for clearance may be an issue.  Will continue to monitor    Shock liver  - likely from low-flow state  - continue resuscitative efforts.  - abdominal ultrasound stable    Acute respiratory failure secondary to shock  - patient remains intubated  - appreciate critical cares help in  management    DM  - sliding scale insulin  - point of care glucose checks    Multiple Sclerosis    Prophylaxis  -PPI  -heparin, SCDs    Disposition  - patient is in very critical condition  - family updated on patient's status and likely poor prognosis  - patient remains full code at this time

## 2018-11-07 NOTE — EICU
New admit    59 y/o F history of DM, multiple sclerosis presents with 3 day history of nausea, vomiting and diarrhea as well as fever.  She also started to complain of left arm swelling and pain. While in the ED she had rapid deterioration of her clinical status, hypotension and eventually intubated.  Imaging of left arm consistent with necrotizing fascitis and underwent emergent debridement.  Blood culture positive for GPC in chains.    Camera assessment:  Intubated, not sedated, synchronized on vent  , PEEP 8, FiO2 80%, RR 28, I:E 1:2, peak flow 42  On norepinephrine, epinephrine, vasopressin, bicarb drip with total IVF approx 500/hr  On CRRT not tolerating fluid removal due to hypotension  Soaked bandage left arm  Dark NG output reportedly amounting to 800 cc    Telemetry:  BP 95/41  HR 78    Data:  ABG 7.007/24/235  Urine ketones positive  WBC 4.57, H/H 9.1/28.6  INR 1.7  Na 137, K 5.4, creatinine 3.8  Lactic acid >12  Albumin 1.8  AST 1568, ALT 1140    · Septic shock secondary to necrotizing fascitis s/p debridement, on vancomycin, piperacilin-tazobactam and clindamycin.  Multiple pressors, stress dose steroids. Ordered albumin  · Acute kidney injury, now on CRRT for anuria and metabolic acidosis, lactic acidosis  · Anemia with dark NG output sent for gastric occult, on pantoprazole, repeat CBC pending. Ordered type and screen for possible transfusion. Elevated INR, repeat.  · DM, positive ketones, elevated glucose 200 to 300s, on steroids, start insulin drip  · Transaminitis from shocked liver  · Prognosis guarded

## 2018-11-07 NOTE — PLAN OF CARE
Problem: Renal Replacement, Continuous (Adult)  Intervention: Promote Circulation and Venous Return   11/07/18 0505   Positioning   Body Position foot of bed elevated;legs elevated;neutral body alignment;neutral head position;weight shift assistance provided;side-lying, left     Intervention: Minimize Blood Loss/Manage Clotting   11/07/18 0305   Safety Interventions   Bleeding Management dressing monitored   Bleeding Precautions blood pressure closely monitored;coagulation study results reviewed;monitored for signs of bleeding       Goal: Signs and Symptoms of Listed Potential Problems Will be Absent, Minimized or Managed (Renal Replacement, Continuous)  Signs and symptoms of listed potential problems will be absent, minimized or managed by discharge/transition of care (reference Renal Replacement, Continuous (Adult) CPG).  Outcome: Ongoing (interventions implemented as appropriate)   11/07/18 0500   Renal Replacement, Continuous   Problems Assessed (Continuous Renal Replacement Therapy) hypothermia;infection;fluid imbalance;hematologic complications;hypotension;acid-base imbalance;electrolyte imbalance   Problems Present (Continuous Renal Replacement Therapy) hypothermia;infection;fluid imbalance;hematologic complications;hypotension;acid-base imbalance;electrolyte imbalance       Problem: Infection, Risk/Actual (Adult)  Intervention: Manage Suspected/Actual Infection   11/07/18 0500   Safety Interventions   Isolation Precautions environmental surveillance   Infection Management aseptic technique maintained     Intervention: Prevent Infection/Maximize Resistance   11/07/18 0500   Respiratory Interventions   Airway/Ventilation Management airway patency maintained;humidification applied   Nutrition Interventions   Glycemic Management blood glucose monitoring;insulin infusion adjusted;insulin infusion initiated   Pain/Comfort/Sleep Interventions   Sleep/Rest Enhancement consistent schedule promoted;family presence  promoted;noise level reduced       Goal: Identify Related Risk Factors and Signs and Symptoms  Related risk factors and signs and symptoms are identified upon initiation of Human Response Clinical Practice Guideline (CPG)  Outcome: Ongoing (interventions implemented as appropriate)   11/07/18 0500   Infection, Risk/Actual   Related Risk Factors (Infection, Risk/Actual) malnutrition;immunosuppressed;chronic illness/condition;poor personal hygiene;skin integrity impairment;medication effects;sleep disturbance;surgery/procedure;tissue perfusion altered;trauma injury   Signs and Symptoms (Infection, Risk/Actual) blood glucose changes;body temperature changes;skin cool/clammy;edema;drainage;heart rate increase;mental/behavioral changes;lab value changes

## 2018-11-07 NOTE — CONSULTS
Nephrology Consult  H&P      Consult Requested By: No att. providers found  Reason for Consult: ARF, acidosis, multiple electrolytes derrangements    SUBJECTIVE:     History of Present Illness:  Patient is a 58 y.o. female presents with left arm edema pain.Chart indicates she had worsening nausea/vomiting and diarrhea for about 3 days     Rapidly developed septic shock, taken to OR fo necrotizing fascitis, now in multi organ failure, anuric, shock liver, severe acidosis with ph 6.8 on levophd and Vasopression        Review of Systems   Unable to perform ROS: Critical illness       Past Medical History:   Diagnosis Date    Diabetes mellitus     Hypertension     Multiple sclerosis     Stroke      Past Surgical History:   Procedure Laterality Date     SECTION, CLASSIC      CHOLECYSTECTOMY      CHOLECYSTECTOMY, LAPAROSCOPIC N/A 2013    Performed by Lainey Albarran MD at [facility] OR    COLONOSCOPY N/A 2016    Procedure: COLONOSCOPY;  Surgeon: Chase Wise MD;  Location: [facility] ENDO;  Service: Endoscopy;  Laterality: N/A;    COLONOSCOPY N/A 2016    Performed by Chase Wise MD at [facility] ENDO    HYSTERECTOMY       No family history on file.  Social History     Tobacco Use    Smoking status: Current Every Day Smoker     Packs/day: 0.75     Years: 30.00     Pack years: 22.50    Smokeless tobacco: Never Used   Substance Use Topics    Alcohol use: No     Alcohol/week: 0.0 oz    Drug use: No       Review of patient's allergies indicates:  No Known Allergies         OBJECTIVE:     Vital Signs (Most Recent)  Vitals:    18 1820 18 1825 18 1830 18 1835   BP:       BP Location:       Patient Position:       Pulse: 100 100 99 98   Resp: (!) 28 (!) 28 (!) 28 (!) 28   Temp:       TempSrc:       SpO2: (!) 91% (!) 91% (!) 90% 100%   Weight:       Height:             Date 18 0700 - 18 0659   Shift 4678-1070 8204-8307 3637-3287 24 Hour  Total   INTAKE   I.V.(mL/kg)  1181(12.6)  1181(12.6)   NG/GT  30  30   IV Piggyback 50   50   Shift Total(mL/kg) 50(0.5) 1211(12.9)  1261(13.4)   OUTPUT   Urine(mL/kg/hr) 50(0.1)   50   Drains  0  0   Shift Total(mL/kg) 50(0.5) 0(0)  50(0.5)   Weight (kg) 93.9 93.9 93.9 93.9           Medications:   chlorhexidine  15 mL Mouth/Throat BID    clindamycin (CLEOCIN) IVPB  900 mg Intravenous Q8H    [START ON 11/7/2018] fludrocortisone  100 mcg Oral Daily    heparin (porcine)  5,000 Units Subcutaneous Q8H    hydrocortisone sodium succinate  100 mg Intravenous Q8H    norepinephrine        [START ON 11/7/2018] pantoprazole  40 mg Intravenous Daily    piperacillin-tazobactam (ZOSYN) IVPB  4.5 g Intravenous Q12H    [START ON 11/8/2018] vancomycin (VANCOCIN) IVPB  1,000 mg Intravenous Q48H           Physical Exam   Constitutional: She is intubated.   obese   HENT:   ET tube   Eyes: Pupils are equal, round, and reactive to light.   Neck:   L trialysis line  L TLC   Cardiovascular: Regular rhythm. Tachycardia present. Exam reveals decreased pulses.   No murmur heard.  Pulmonary/Chest: She is intubated. She has decreased breath sounds. She has no wheezes. She has no rhonchi.   Abdominal: Soft. She exhibits no distension. Bowel sounds are absent. There is no tenderness.   Genitourinary:   Genitourinary Comments: Bull[place], anuric   Musculoskeletal: She exhibits no edema.   Neurological: She displays facial symmetry.   unresponsive   Skin:   Mottled, cool, dry       Laboratory:  Recent Labs   Lab 11/06/18  0920 11/06/18  1442 11/06/18  1511 11/06/18  1604   WBC 4.85  --   --  4.57  4.57   HGB 13.2  --   --  9.1*  9.1*   HCT 40.5 26* 22* 28.6*  28.6*     --   --  110*  110*   MONO 7.6  0.4  --   --  7.7  7.7  0.4  0.4     Recent Labs   Lab 11/06/18  0920 11/06/18  1605    137  137   K 3.6 5.4*  5.4*   CL 95 98  98   CO2 7* 7*  7*   BUN 43* 43*  43*   CREATININE 3.7* 3.8*  3.8*   CALCIUM 9.3 8.5*  8.5*    PHOS  --  >15.0*       Diagnostic Results:  X-Ray: Reviewed  US: Reviewed  Echo: Reviewed  ASSESSMENT/PLAN:     Septic shock from necrotizing fascitis SP fasciotomy, multiorgan failure: Acute Liver, ARF, anuric, acute respiratory failure    Severe acidosis  With ph 6.88, lactate >12, monitor serial labs  Severe hyperphos >15, likely rhabdo, check serial CPK  Mild hyperkalemia    Emergent CVVHD. 3K 3Ca 35 bicarb 140 Na, additionally start D5W with 150 meq of Sodium     Agree with pulse steroid    Adjust vent:A/C, agree with decreasing FiO2, additionally please increase tidal volume to 450 (from 350), rate currently 28    Dose all abx as per creatinine clearance ~30-50 on high dose CVVHD @ 3.5L/hour: discussed with pharmacy to check dosing especially for clinda and zosyn. For Vanc check lvl in AM and redose if <15    Total critical care time 55 minutes: included management of organ failures related to critical illness, titration of continuous infusions, review of pertinent labs and imaging studies, discussion with primary team    Thank you for consult, will follow  With any question please call [phone]  Jackie Galindo    [facility]  ELIZABETH Rueda MD, COURTNEY THIBODEAUX MD,   MD EDDIE Gillis, NP  [address]  [place], [address] [phone]

## 2018-11-07 NOTE — PROGRESS NOTES
Patient's condition continues to worsen.  She is now maxed out on Levophed and is on vasopressin and epinephrine.  CRRT has been running but not at the desired filtration rate.  Patient's lactic acid remains greater than 12.  She is severely acidotic despite bicarbonate administration.  Recent labs showed hemoglobin of 2 and an INR 6.  She is likely and septic induced DIC. The patient has occult blood her abdomen is becoming distended.  Suspect patient had a significant ischemic event causing low flow to all major organ systems including the liver, kidneys, brain and abdominal organs.  The patient's family has been updated on her grave status understanding that no meaningful recovery is expected at this time.  They agree to make the patient DNR and do not wanting chest compressions or shocking of the patient. We will however continue supportive care to more family members can arrive.  At that time will discuss possibly withdrawal of care.

## 2018-11-07 NOTE — NURSING
0125-Dr. Andradeed on unit; notified of increased need for epinephrine infusion and inability to tolerate an ultrafiltration rate on CRRT; suggested another bolus or obtaining an ABG; ordered to administer another bolus of normal saline for 500ml and obtain an ABG; ALEXIA Finney notified        0330- message left for Dr. Jaime regarding change in status (HR and BP), Dr Ordonez notified; ordered to transfuse blood products at this time; Sami (son) and Latasha (sister) notified, Sami to arrive at bedside shortly     0420- blood transfusion infusing, unable to collect labs at this time     0655- critical H/H reported to Dr. Jaime; okay to hold FFP and further transfusions at this time, as family is currently making decision to withdraw care     0700- updated Valeri (CURT) with patient's status, MAPs, and blood cultures; instructed to notify CURT at TOD

## 2018-11-07 NOTE — NURSING
S/W CURT, refer to CURT flow sheet in epic. S/W Maxx at the 's office who, after lengthy discussion of her case, has been released.

## 2018-11-07 NOTE — NURSING
Attempted to set up CRRT at this time. Pt draped /under sterile procedure. Will return to BS shortly to assess line availability

## 2018-11-07 NOTE — NURSING
Family has requested [facility]  home. Family has left bedside, post mortuum care performed for preparation to transfer to the [facility]. Charge nurse notified.

## 2018-11-07 NOTE — NURSING
CRRT initiated without difficulty. BFR @ 200mL/h due to low BP. Primary RN will increase to 300mL/h as pt tolerates.

## 2018-11-07 NOTE — ANESTHESIA POSTPROCEDURE EVALUATION
"Anesthesia Post Evaluation    Patient: Serenity D Angle    Procedure(s) Performed: Procedure(s) (LRB):  IRRIGATION AND DEBRIDEMENT, UPPER EXTREMITY (Left)    Final Anesthesia Type: general  Patient location during evaluation: ICU  Patient participation: No - Unable to Participate, Coma/Other Inability to Communicate  Level of consciousness: obtunded/minimal responses  Post-procedure vital signs: reviewed and not stable  Pain management: adequate  Airway patency: patent  PONV status at discharge: No PONV  Anesthetic complications: no      Cardiovascular status: hemodynamically unstable and hypotensive  Respiratory status: ventilator  Hydration status: hypovolemic  Follow-up not needed.        Visit Vitals  BP (!) 87/41   Pulse 76   Temp (!) 33.9 °C (93 °F) (Axillary)   Resp 16   Ht 5' 4" (1.626 m)   Wt 93.9 kg (207 lb)   LMP 01/01/1970   SpO2 (!) 11%   BMI 35.53 kg/m²       Pain/Melissa Score: Pain Assessment Performed: Yes (11/7/2018  5:05 AM)  Presence of Pain: non-verbal indicators absent (11/7/2018  5:05 AM)        "

## 2018-11-07 NOTE — PROCEDURES
11/07/2018    Serenity Powell  [id number]    PROCEDURE PERFORMED:  Left internal jugular hemodialysis catheter placement    PERFORMING SURGEON: Mehran Jaime Jr    CONSENT:  The risks benefits and alternatives of the procedure were discussed with the patient's family. Th they were queried for questions and all concerns were addressed.  The family provided written consent for the procedure.    ANESTHESIA:  None    INDICATION:  58-year-old female was septic shock and multi-system organ failure.  She has significant lactic acidosis, hyperkalemia and hyperphosphatemia requiring CRRT treatment.  Internal jugular dialysis catheter was requested for access.    FINDINGS:  Ultrasound guidance is that access the left internal jugular vein with return of dark red nonpulsatile blood.  Catheter inserted and aspirated and flushed with ease.  Postop chest x-ray was obtained to confirm appropriate position    PROCEDURE IN DETAIL:  After informed consent was obtained from the patient's family the patient's left neck was prepped and draped typical sterile fashion.  Ultrasound used to identify the left internal jugular vein which was seen and found to be collapsible.  Patient straight placed in site Trendelenburg positioning. Ultrasound was used to guide needle placement into the internal jugular vein.  We had a return of dark red nonpulsatile blood.  We then threaded a guidewire through the needle.  We used ultrasound to confirm the wire was present within the vein.  We then made a skin nick at the insertion site and dilated the skin and subcutaneous tract.  Catheter was inserted over the wire position at 18 cm at the skin. Is found to aspirate and flush with ease is sutured to the skin after a Biopatch and sterile dressing was placed.    EBL:  5 cc    COMPLICATIONS:  None    CONDITION:  Critical    DISPO:  Remains in ICU

## 2018-11-07 NOTE — EICU
Repeat labs shows severe anemia of Hgb 2, INR 6    Ordered transfusion of 3 units PRBC, 4 units FFP    Abdomen distended concern for intraabdominal bleed vs mesenteric ischemia given persistently elevated lactate    Discussed with family, son and sister at bedside, regarding grave condition. Likely patient in DIC with multiorgan failure secondary to septic shock.  Primary MD as bedside as well.    Explained that meaningful recovery unlikely if patient were to go into cardiorespiratory arrest.  Son would want to discuss this further with other family members but is inclined to proceed with DNR status.

## 2018-11-07 NOTE — NURSING
Despite extensive lifesaving maneuvers, Mrs. Powell is continuing to rapidly decline. She is intrinsically hypothermic, SBP is in the 60's also of note on the ECG is found to be in a wide complex agonal idioventricular rhythm. She is pale with scattered petechia and mottling all over her body. Currently she is on the vent on AC with peak pressures in the 40's and RR of 25 which is not over the set vent rate. She is currently on vasopressin at 0.04u/min, levophed at 3mcg/kg/min, and epinephrine at 2mcg/kg/min & a NaHCO3 drip at 150ml's/hr. Currently she is DNR and the family is requesting to withdraw care.

## 2018-11-07 NOTE — EICU
Bedside nurse called to report family are in the room. He ask for  To camera into room. Informed Dr. Franks.  04:39- nurse called to report a rhythm change. Now accelerated ventricular . Informed Dr. Ndiaye.

## 2018-11-07 NOTE — PROGRESS NOTES
"Vancomycin Dosing and Monitoring Pharmacy Protocol    Serenity Powell is a 58 y.o. female    Height: 5' 4" (1.626 m)   Wt Readings from Last 3 Encounters:   11/06/18 93.9 kg (207 lb)   08/28/18 94 kg (207 lb 3.7 oz)   07/11/18 93.5 kg (206 lb 2.1 oz)       Temp Readings from Last 3 Encounters:   11/06/18 96.6 °F (35.9 °C) (Core Esophageal)   11/23/16 98.4 °F (36.9 °C) (Oral)   08/16/16 98.1 °F (36.7 °C) (Oral)      Lab Results   Component Value Date/Time    WBC 4.57 11/06/2018 04:04 PM    WBC 4.57 11/06/2018 04:04 PM    WBC 4.85 11/06/2018 09:20 AM      Lab Results   Component Value Date/Time    CREATININE 3.8 (H) 11/06/2018 04:05 PM    CREATININE 3.8 (H) 11/06/2018 04:05 PM    CREATININE 3.7 (H) 11/06/2018 09:20 AM      Lab Results   Component Value Date/Time    LACTATE >12.0 (HH) 11/06/2018 04:03 PM    LACTATE >12.0 (HH) 11/06/2018 04:03 PM    LACTATE >12.0 (HH) 11/06/2018 01:46 PM       Serum creatinine: 3.8 mg/dL (H) 11/06/18 1605  Estimated creatinine clearance: 17.7 mL/min (A)    Antibiotics (From admission, onward)      Start     Stop Route Frequency Ordered    11/08/18 1200  vancomycin in dextrose 5 % 1 gram/250 mL IVPB 1,000 mg  ( Skin & Soft Tissue Infection: Purulent, Severe)      -- IV Every 48 hours (non-standard times) 11/06/18 1733    11/06/18 2230  piperacillin-tazobactam 4.5 g in dextrose 5 % 100 mL IVPB (ready to mix system)      -- IV Every 12 hours (non-standard times) 11/06/18 1742    11/06/18 1930  clindamycin 900 MG/50 ML D5W 900 mg/50 mL IVPB 900 mg  ( Skin & Soft Tissue Infection: Purulent, Severe)      11/08 1929 IV Every 8 hours (non-standard times) 11/06/18 1556          Antifungals (From admission, onward)      None            Microbiology Results (last 7 days)       Procedure Component Value Units Date/Time    Fungus culture [[id number]] Collected:  11/06/18 1451    Order Status:  Sent Specimen:  Incision site from Arm, Left Updated:  11/06/18 1419    Culture, Anaerobe [[id number]] " Collected:  18    Order Status:  Sent Specimen:  Incision site from Arm, Left Updated:  18    Aerobic culture [[id number]] Collected:  18    Order Status:  Sent Specimen:  Incision site from Arm, Left Updated:  18    AFB Culture & Smear [[id number]] Collected:  18    Order Status:  Sent Specimen:  Incision site from Arm, Left Updated:  18    Gram stain [[id number]] Collected:  18    Order Status:  Sent Specimen:  Incision site from Arm, Left Updated:  18    Blood culture #1 **CANNOT BE ORDERED STAT** [[id number]] Collected:  1815    Order Status:  Sent Specimen:  Blood from Peripheral, Antecubital, Left Updated:  18 1403    Blood culture #2 **CANNOT BE ORDERED STAT** [[id number]] Collected:  18 0920    Order Status:  Sent Specimen:  Blood from Peripheral, Hand, Right Updated:  18 1403    Urine culture [[id number]] Collected:  18 1211    Order Status:  No result Specimen:  Urine Updated:  18 1233            Indication/Target trough:   Sepsis (Target trough: 15-20mcg/ml)    Hemodialysis:   N/A    Dosing Weight:   Wt Readings from Last 1 Encounters:   18 93.9 kg (207 lb)       Last Vancomycin dose: N/A   Date/Time given: N/A         Vancomycin level:  No results for input(s): VANCOMYCIN-TROUGH in the last 72 hours.  Recent Labs   Lab Result Units 18  1604   Vancomycin, Random ug/mL 64.8       Per Protocol Initial/Adjustments Dosin. Initial/Adjustment Dose: Loading dose = 2250mg x1, followed by Maintenance dose of 1000 mg q 48hr to be given at 2018 1200 date/time  2. Vancomycin Trough Level will be drawn on 11/10/2018  1100 date/time    Pharmacy will continue to follow.    Please contact if you have any further questions. Thank you.    Kaylyn Nixon, PharmD  [phone]

## 2018-11-07 NOTE — PROGRESS NOTES
Patient is a 58-year-old female with came in suspected left upper extremity necrotizing fasciitis.  She became septic hypertensive required intubation.  It is likely that during her hypertensive episode she suffered a severe ischemic insult to organ systems including her liver, kidney, bowel and brain.  She started on broad-spectrum antibiotics including vancomycin, Zosyn and clindamycin.  Patient subsequently underwent emergent incision and drainage of the left upper extremity is taken intubated to the ICU.  She remained unresponsive without sedative medications.  She was hypotensive which eventually required 3 vasopressors medications which were maxed out.  She was then liver shock and had severe renal dysfunction with significant lactic acidosis greater than 12, hyperkalemia and hyperphosphatemia.  Nephrology was consulted and CRRT was initiated.  During CRRT patient became more unstable requiring increasing with suppressive medication fluid boluses.  Lactic acid failed improved, she developed coagulopathy and she can had no improvement in her mental status.  Her grave situation was discussed with patient's family who eventually Mater DNR.  Patient continued to worsen found have a hemoglobin reported as being near the 3 is received blood transfusions.  Her abdomen became distended and she was passing bloody stools which was concerning for ischemic bowel. Her coagulopathy was severe and may be in the ICU related to sepsis.  As patient continued to worsen she went into asystole and time of death was called at 7:27 a.m. on 2018.  Family was present at bedside when the patient went into asystole and notified of passing.  Patient was not a candidate for lobe of.   was contacted and reviewed the case the released the body with family.  Family has requested [facility]  home.  Autopsy was offered.    Cause of death:  Multi-system organ failure secondary to sepsis from necrotizing  fasciitis

## 2018-11-07 NOTE — SIGNIFICANT EVENT
Patient developed asystole and was without pulse.  Exam was performed patient had no reflexes and no heart sounds.  Time of death was called at 7:27 a.m. on November 7, 2018.  Formal death note to follow

## 2018-11-07 NOTE — ANESTHESIA PREPROCEDURE EVALUATION
2018  Serenity Powell is a 58 y.o., female smoker with poorly controlled DM, MS, obesity and hx of CVA.     Past Medical History:   Diagnosis Date    Diabetes mellitus     Hypertension     Multiple sclerosis     Stroke      Past Surgical History:   Procedure Laterality Date     SECTION, CLASSIC      CHOLECYSTECTOMY      CHOLECYSTECTOMY, LAPAROSCOPIC N/A 2013    Performed by [facility]ey [facility]ran [facility] at [facility] OR    COLONOSCOPY N/A 2016    Procedure: COLONOSCOPY;  Surgeon: Chase Wise MD;  Location: [facility] ENDO;  Service: Endoscopy;  Laterality: N/A;    COLONOSCOPY N/A 2016    Performed by Chase Wise MD at [facility] ENDO    HYSTERECTOMY       Lab Results   Component Value Date    WBC 4.57 2018    WBC 4.57 2018    HGB 9.1 (L) 2018    HGB 9.1 (L) 2018    HCT 28.6 (L) 2018    HCT 28.6 (L) 2018     (L) 2018     (L) 2018    CHOL 171 2018    TRIG 364 (H) 2018    HDL 26 (L) 2018    ALT 1,140 (H) 2018    ALT 1,140 (H) 2018    AST 1,568 (H) 2018    AST 1,568 (H) 2018     2018     2018    K 5.4 (H) 2018    K 5.4 (H) 2018    CL 98 2018    CL 98 2018    CREATININE 3.8 (H) 2018    CREATININE 3.8 (H) 2018    BUN 43 (H) 2018    BUN 43 (H) 2018    CO2 7 (LL) 2018    CO2 7 (LL) 2018    TSH 3.111 2018    INR 1.7 (H) 2018    HGBA1C 6.6 (H) 2018         Pre-op Assessment    I have reviewed the Patient Summary Reports.      I have reviewed the Medications.     Review of Systems  Anesthesia Hx:   Denies Personal Hx of Anesthesia complications.   Social:  Smoker, Alcohol Use 1 PPD x 35 years    Hematology/Oncology:  Hematology Normal   Oncology Normal     Cardiovascular:    Exercise tolerance: good Hypertension    Pulmonary:  Pulmonary Normal    Renal/:  Renal/ Normal     Hepatic/GI:   Liver Disease, (DOMINGUEZ)    Musculoskeletal:  Musculoskeletal Normal    Neurological:   CVA (Residual right hand numbness) Multiple sclerosis; weekly interferon beta    Endocrine:   Diabetes, poorly controlled, type 2, using insulin    Psych:   depression          Physical Exam  General:  Obesity    Airway/Jaw/Neck:  Airway Findings: Mouth Opening: Normal Tongue: Normal  General Airway Assessment: Adult  Mallampati: II  TM Distance: < 4 cm  Jaw/Neck Findings:     Neck ROM: Normal ROM  Neck Findings: Normal    Eyes/Ears/Nose:  EYES/EARS/NOSE FINDINGS: Normal   Dental:  Dental Findings: (intubated) In tact    Chest/Lungs:  Chest/Lungs Findings: Decreased Breathe Sounds Left, Decreased Breathe Sounds Right     Heart/Vascular:  Heart Findings: Rate: Tachycardia  Rhythm: Regular Rhythm        Mental Status:  Mental Status Findings:  Unconscious         Anesthesia Plan  Type of Anesthesia, risks & benefits discussed:  Anesthesia Type:  general  Patient's Preference:   Intra-op Monitoring Plan:   Intra-op Monitoring Plan Comments:   Post Op Pain Control Plan:   Post Op Pain Control Plan Comments:   Induction:   IV  Beta Blocker:  Patient is on a Beta-Blocker and has received one dose within the past 24 hours (No further documentation required).       Informed Consent: Patient understands risks and agrees with Anesthesia plan.  Questions answered. Anesthesia consent signed with patient.  ASA Score: 5  emergent   Day of Surgery Review of History & Physical: I have interviewed and examined the patient. I have reviewed the patient's H&P dated:        Anesthesia Plan Notes: Pt is critically ill. Intubated on pressors with whole body mottling.        Ready For Surgery From Anesthesia Perspective.

## 2018-11-07 NOTE — PLAN OF CARE
Problem: Renal Replacement, Continuous (Adult)  Goal: Signs and Symptoms of Listed Potential Problems Will be Absent, Minimized or Managed (Renal Replacement, Continuous)  Signs and symptoms of listed potential problems will be absent, minimized or managed by discharge/transition of care (reference Renal Replacement, Continuous (Adult) CPG).  Outcome: Ongoing (interventions implemented as appropriate)   11/06/18 2024   Renal Replacement, Continuous   Problems Assessed (Continuous Renal Replacement Therapy) all   Problems Present (Continuous Renal Replacement Therapy) acid-base imbalance;electrolyte imbalance;fluid imbalance   CVVHD

## 2018-11-07 NOTE — NURSING
With the family at bedside, Mrs. Powell presents asystole on the ECG in multiple leads. No pulse can be felt jugular, femoral nor can a cardiac cycle be seen on the A-line waveform tracing. Heart sounds are absent. Lewis Arita & Yeni notified.

## 2018-11-07 NOTE — PROCEDURES
11/07/2018    Serenity DESAI Sherry  [id number]    PROCEDURE PERFORMED:  Right femoral arterial line placement    PERFORMING SURGEON: Mehran Jamie Jr    CONSENT:  The risks benefits and alternatives of the procedure were discussed with the patient. The patient was queried for questions and all concerns were addressed.  The patient provided written consent for the procedure.    ANESTHESIA:  None    INDICATION:  Patient is severely septic with need for continuous blood pressure monitoring.  Currently right radial arterial line is not functioning.  Left radial arterial line is not an option as patient has had necrotizing fasciitis of that extremity.    FINDINGS:  Right red pulsatile blood encountered a line placed with appropriate waveform    PROCEDURE IN DETAIL:  After informed consent was obtained from the patient's family the right groin was prepped and draped in typical sterile fashion.  The femoral artery was palpated and a needle was introduced under causes negative pressure. Had return of bright red pulsatile blood.  Rubber threaded a guidewire easily through the needle.  The needle was then removed a small skin nick was made.  Arterial catheter was threaded over the guidewire into the artery.  This was connected to the A-line monitor in the appropriate waveform.  The line was then sutured into place and a Biopatch and sterile dressing was applied.    EBL:  5 cc    COMPLICATIONS:  None    CONDITION:  Critical    DISPO:  Remain in ICU

## 2018-11-07 NOTE — EICU
Bedside nurse called to report pt had 800 ml of dark red secretions from  ngt , since surgery. Informed Dr. Ordonez.

## 2018-11-08 LAB — BACTERIA SPEC AEROBE CULT: NORMAL

## 2018-11-08 NOTE — PHYSICIAN QUERY
"PT Name: Serenity Powell  MR #: [id number]     Physician Query Form - Documentation Clarification      CDS: Winnie Marrero RN, CCDS         Contact information :ext [phone])  [email or web address]       This form is a permanent document in the medical record.     Query Date: November 8, 2018    By submitting this query, we are merely seeking further clarification of documentation. Please utilize your independent clinical judgment when addressing the question(s) below.    The Medical record reflects the following:    Supporting Clinical Findings Location in Medical Record     "There is superficial ulceration over the ulcer and bursa and over the forearm."    "They report the patient had vague left arm pain and fatigue starting Saturday which slowly progressed become more edematous and painful.  At this time she is also suffering from the nausea vomiting and diarrhea.  They do not remember any recent trauma or injury to the left upper extremity.  They report she has diabetes hypertension and MS which she is on methotrexate for."    "Necrotizing Fascitis LUE"    "CT of the abdomen pelvis and up left upper extremity was obtained.  No obvious intra-abdominal process present the left arm shows significant inflammation and fatty stranding.  Ultrasound was obtained which ruled out DVT.  Patient was seen and emergently taken operating room for incision and drainage of the left upper extremity for suspected sepsis."    "The left arm was inspected and there is no palpable pulse at the time however there is very weak pulses in the other distal extremities.  There is significant edema and mottling of the skin with some superficial blistering along the elbow and forearm"     H&P 11/6/18      H&P 11/6/18                H&P 11/6/18    Op Note 11/6/18                Op Note 11/6/18                                                                                  Doctor, Please specify diagnosis or diagnoses associated with above " clinical findings.  Please document type and severity of LUE ulcer    Provider Use Only    [ X  ] Non-pressure ulcer   * Severity/Depth:  __X_Skin breakdown only  ___Fat layer exposed  ___Muscle involvement without evidence of necrosis  ___other, ___      [  ] Other type ulcer, please specify type ,______  * Severity/Depth:  ___Skin breakdown only  ___Fat layer exposed  ___Muscle involvement without evidence of necrosis  ___other, ___                                                                                                                    Clinically Undetermined

## 2018-11-08 NOTE — PHYSICIAN QUERY
"PT Name: Serenity Powell  MR #: [id number]    Physician Query Form - Cause and Effect Relationship Clarification      CDS: Winnie Marrero RN, CCDS         Contact information :ext [phone])  [email or web address]     This form is a permanent document in the medical record.     Query Date: November 8, 2018    By submitting this query, we are merely seeking further clarification of documentation. Please utilize your independent clinical judgment when addressing the question(s) below.    The Medical record contains the following:  Supporting Clinical Findings   Location in record                                                                      "Sepsis  - patient is in severe septic shock with multi-system organ failure.  - continue supportive measures with fluid resuscitation, vasopressor medications and broad-spectrum antibiotics.  - maintenance fluids with bicarb  - nephrology consulted for lactic acidosis and CRT T'd be initiated  - currently on Levophed and vasopressin.  Will add epinephrine additional agent as needed  - stress dose steroids ordered  - trend lactic acid, ABGs with pH, CPK"    Blood culture result   STREPTOCOCCUS PYOGENES (Group A)                                                                                                                      H&P 11/6/18                          Lab 11/6/18                                        Doctor, please clarify if there is any correlation between Sepsis  and  STREPTOCOCCUS PYOGENES (GROUP A) .           Are the conditions:       [ X ] Due to or associated with each other    [  ] Unrelated to each other    [  ] Other (Please Specify): _________________________    Clinically Undetermined                                                                                                                                                                                             "

## 2018-11-08 NOTE — PHYSICIAN QUERY
"PT Name: Serenity Powell  MR #: [id number]    Physician Query Form - Hematology Clarification      CDS: Winnie Marrero RN, CCDS         Contact information :ext [phone])  [email or web address]       This form is a permanent document in the medical record.      Query Date: November 8, 2018    By submitting this query, we are merely seeking further clarification of documentation. Please utilize your independent clinical judgment when addressing the question(s) below.    The Medical record contains the following:   Indicators  Supporting Clinical Findings Location in Medical Record   x "Anemia" documented "normocytic anemia"   Critical Care Medicine Consult 11/6/18   x H & H = H/H 13.2/40.5  H/H  2.5/7.8 Lab 11/6/18  Lab 11/7/18    BP =                     HR=      "GI bleeding" documented      Acute bleeding (Non GI site)     x Transfusion(s) Ordered transfusion of 3 units PRBC, 4 units FFP   eICU Note 11/7/18    Treatment:     x Other:  "Thrombocytopenia"  "Will follow and check workup when more stable  -Normally she has normal H/H."  Stool occult blood positive  " Her abdomen became distended and she was passing bloody stools which was concerning for ischemic bowel."  "She is likely and septic induced DIC." Critical Care Medicine Consult 11/6/18      Lab 11/7/18  Surgery Progress note 11/7/18           Doctor, please specify diagnosis or diagnoses associated with above clinical findings.  Please document anemia type.    [  ] Acute blood loss anemia   [x ] Precipitous drop in Hematocrit       [  ] Other Hematological Diagnosis (please specify):      Clinically Undetermined       Please document in your progress notes daily for the duration of treatment, until resolved, and include in your discharge summary.     I am not the attending MD nor I do the discharge summary, but the patient received blood because her hemoglobin dropped to 2 gm/dl, with a hematocrit of 17.                                                    "

## 2018-11-08 NOTE — PHYSICIAN QUERY
"PT Name: Serenity Powell  MR #: [id number]    Physician Query Form - Procedure Clarification     CDS: Winnie Marrero RN, CCDS         Contact information :ext [phone])  [email or web address]     This form is a permanent document in the medical record.     Query Date: November 8, 2018  By submitting this query, we are merely seeking further clarification of documentation. Please utilize your independent clinical judgment when addressing the question(s) below.    The Medical record contains the following:     Indicators       Supporting Clinical Findings   Location in Medical Record   x Documentation of "Debridement"   "Throughout the operation we inspected the muscle bellies and the subcutaneous fat.  There is no obvious necrotic or nonviable tissue.  Minimal debridement was performed but wide drainage was.  Wound was then irrigated copiously with a jet lavaged for 3 L of saline."     Op Note 11/6/18    Documentation of "I & D"      EBL =     x Other: "Patient was seen and emergently taken operating room for incision and drainage of the left upper extremity for suspected sepsis. "  "PROCEDURE:  Left upper extremity incision and drainage, placement multiple Penrose"     Op Note 11/6/18     Excisional debridement is a surgical removal of  nonvitalized tissue, necrosis or slough. The use of a sharp instrument does not always indicate that an excisional debridement was performed.  Non excisional debridement is the scraping, washing, irrigating, brushing away or removal of loose tissue fragments.    Doctor, please specify type of procedure(s) performed:  Please clarify debridement portion of Op Note 11/6/18.  Please document type of debridement as excisional vs non-excisional and please document depth of tissue debrided.    [  ] Excisional Debridement    * Depth of tissue excised:    [  ] Skin [  ]Subcutaneous Tissue/Fascia   [ ] Muscle [  ] Tendon [ ] Bone     [ X ] Non-excisional Debridement   * Depth of tissue " excised:    [  X] Skin [ X ]Subcutaneous Tissue/Fascia   [X ] Muscle [  ] Tendon [ ] Bone         [  ] Other Procedure (Specify) ________________________________    [  ] Clinically Undetermined

## 2018-11-08 NOTE — PHYSICIAN QUERY
"PT Name: Serenity Powell  MR #: [id number]     Physician Query Form - Diagnosis Clarification      CDS: Winnie Marrero RN, CCDS         Contact information :ext [phone])  [email or web address]       This form is a permanent document in the medical record.     Query Date: November 8, 2018    By submitting this query, we are merely seeking further clarification of documentation.  Please utilize your independent clinical judgment when addressing the question(s) below.     The medical record contains the following:      Findings Supporting Clinical Information Location in Medical Record     "There is mild perinephric stranding of the left kidney however UA was not convincing for UTI."                "Differential diagnosis:   sepsis, necrotizing fascitis, gastroenteritis, pneumonia, meningitis, UTI,.."    Urine culture:  GRAM NEGATIVE CECIL   > 100,000 cfu/ml   Identification and susceptibility pending    U/A Nitrite negative, bacteria many   H&P 11/6/18      ED provider note 11/6/18          Lab 11/6/18  (clean catch urine)        Lab 11/6/18     Please clarify if the UTI  diagnosis has been:    [  ] Ruled In   [X  ] Ruled Out   [  ] Other/Clarification of findings (please specify):   [ X ] Clinically insignificant      Clinically undetermined     Please document in your progress notes daily for the duration of treatment, until resolved, and include in your discharge summary.                                                                                "

## 2018-11-09 LAB
BACTERIA BLD CULT: NORMAL
BACTERIA UR CULT: NORMAL

## 2018-11-09 NOTE — DISCHARGE SUMMARY
[facility]  Discharge Summary      Admit Date: 11/6/2018    Discharge Date and Time/time of death: 11/07/2018 @ 0727    Attending Physician:   Mehran Jaime MD    Reason for Admission: Serenity Powell is a 58 y.o. female who presented to the ER with approximately 3 day history of fatigue, malaise, nausea, vomiting and diarrhea.  She also had began to have left arm pain which eventually developed edema starting yesterday.  She also reported fever 102 on Saturday with recurrent fevers Sunday Monday.  In the ER was initiated patient was found to have an edematous left upper extremity with pain and diffuse mottled skin. Laboratory values showed in a KI with a creatinine of 3.7 up from baseline of 1.2,  and lactic acid of greater than 12.  Imaging of the left upper extremity was obtained did not show any obvious fracture.  Ultrasound ruled out DVT.  Subsequently a CT scan was obtained of the head abdomen pelvis and of the left upper extremity.  There is mild perinephric stranding of the left kidney however UA was not convincing for UTI.  The left arm had significant edema but no obvious fluid collection or pneumatosis.  General surgery is consulted for evaluation of left upper extremity infection.  Between the time consult was placed in the time I arrived the patient became altered and hypotensive.  She subsequently was intubated and started on Levophed.  On exam the patient had diffuse mottled skin with cool extremities.  The left upper extremity was edematous and radial pulses not appreciated however all distal pulses were diminished.  Patient's abdomen was soft.  Patient had not received sedation since intubation and was not responding to stimulation.  Due to patient's deteriorating status elected to take the patient back emergently to the operating room for left upper extremity exploration        Procedures Performed: Procedure(s) (LRB):  IRRIGATION AND DEBRIDEMENT, UPPER EXTREMITY  (Left)    Hospital Course: Patient is a 58-year-old female with came in suspected left upper extremity necrotizing fasciitis.  She became septic and hypotensive requiring intubation.  It is likely that during her hypotensive episode she suffered a severe ischemic insult to organ systems including her liver, kidney, bowel and brain.  She was started on broad-spectrum antibiotics including vancomycin, Zosyn and clindamycin.  Patient subsequently underwent emergent incision and drainage of the left upper extremity is taken intubated to the ICU.  She remained unresponsive without sedative medications.  She was hypotensive which eventually required 3 vasopressors medications which were maxed out.  She required central line placement, arterial line placement and hemodialysis line placement  She developed liver shock and had severe renal dysfunction with significant lactic acidosis greater than 12, hyperkalemia and hyperphosphatemia.  Nephrology was consulted and CRRT was initiated.  During CRRT patient became more unstable requiring increasing with suppressive medication fluid boluses.  Lactic acid failed improved, she developed coagulopathy and she can had no improvement in her mental status.  Her grave situation was discussed with patient's family who eventually Mater DNR.  Patient continued to worsen found have a hemoglobin reported as being near the 3 is received blood transfusions.  Her abdomen became distended and she was passing bloody stools which was concerning for ischemic bowel. Her coagulopathy was severe and may be in the ICU related to sepsis.  As patient continued to worsen she went into asystole and time of death was called at 7:27 a.m. on 11/07/2018.  Family was present at bedside when the patient went into asystole and notified of passing.  Patient was not a candidate for lobe of.   was contacted and reviewed the case the released the body with family.  Family has requested [facility]   home.  Autopsy was offered.     Cause of death:  Multi-system organ failure secondary to sepsis from necrotizing fasciitis         Consults: pulmonary/intensive care, nephrology and general surgery    Significant Diagnostic Studies: Labs:   CMP No results for input(s): NA, K, CL, CO2, GLU, BUN, CREATININE, CALCIUM, PROT, ALBUMIN, BILITOT, ALKPHOS, AST, ALT, ANIONGAP, ESTGFRAFRICA, EGFRNONAA in the last 48 hours., CBC No results for input(s): WBC, HGB, HCT, PLT in the last 48 hours. and INR   Lab Results   Component Value Date    INR 6.9 (HH) 2018    INR 1.7 (H) 2018    INR 0.9 2012     Radiology:  CT scan of abdomen pelvis and left upper extremity     Final Diagnoses:    Principal Problem:   Sepsis   Secondary Diagnoses:  Necrotizing fasciitis of left upper extremity, shock liver, acute renal failure, severe acidosis, respiratory failure, anoxic brain event    Discharged Condition:     Disposition:     Follow Up/Patient Instructions:     Medications:  None (patient  at medical facility)  No discharge procedures on file.

## 2018-11-12 LAB — BACTERIA SPEC ANAEROBE CULT: NORMAL

## 2018-11-13 DIAGNOSIS — E11.8 TYPE 2 DIABETES MELLITUS WITH COMPLICATION: ICD-10-CM

## 2018-11-13 RX ORDER — METFORMIN HYDROCHLORIDE 1000 MG/1
TABLET ORAL
Qty: 180 TABLET | Refills: 1 | OUTPATIENT
Start: 2018-11-13

## 2018-11-14 ENCOUNTER — TELEPHONE (OUTPATIENT)
Dept: SURGERY | Facility: CLINIC | Age: 58
End: 2018-11-14

## 2018-11-14 NOTE — TELEPHONE ENCOUNTER
----- Message from Katharine Carson sent at 11/14/2018  9:32 AM CST -----  Contact: Anabel marley/ [facility]/ [phone]  Anabel needs patient's death certificate to be signed but she said there's something wrong with the [employer] account.    Please call.

## 2018-12-11 LAB — FUNGUS SPEC CULT: NORMAL

## 2019-01-08 LAB
ACID FAST MOD KINY STN SPEC: NORMAL
MYCOBACTERIUM SPEC QL CULT: NORMAL

## 2023-05-11 NOTE — PATIENT INSTRUCTIONS
Recommendations for today    3 recommend that you start using nicotine patches to reduce daily cravings for cigarettes.  Check blood pressure at least once daily and come prepared to the next clinic visit so that we can review blood pressure numbers.    Blood pressure is not well controlled overall.  Therefore we recommend increasing the dosage of metoprolol.  We recommend taking 100 mg of metoprolol twice daily.  For the first 7 days take 100 mg in the morning and 50 mg in the evening.  If no side effects after 7 days start taking 100 mg in the morning and 100 mg in the evening.        Fundamental facts about diabetes control  · Your ideal blood sugar range should be between 90 and 120 when you are fasting\have an empty stomach.  · Fasting is defined as having an empty stomach for at least 3-4 hours  · A1c is a blood test for diabetics that helps to explain if you or still at risk for diabetes complications based on your average blood sugar levels.  This test measures average blood sugar levels for the 3 months prior to the date of your blood test.  · Your target A1c is 7%    Checking Blood Sugar  · We recommend that you check blood sugar 2 times per day.   · First blood sugar of the day should be fasting in the morning  · You should also check before dinner    Logging Your Blood Sugar  · Write this down on a glucose log with the date and time and any special circumstances if sugar is below 85 (low sugar) or above 130 (high sugar). For example did you skip meals prior to low sugar? Did you eat a sugary or carb rich meal or snack before a high sugar?   · Write down a comment every time you have a sugar in the low 80s letting us know why the sugar might be lower than usual.  For example did you skip a meal?  Did you give yourself too much insulin?    AFTER CARE INSTRUCTIONS   · Please bring your glucometer at each visit or your glucose logs   · Please make sure you call the clinic if you have low sugar, lower than 85    · Please call the clinic if you ever have a sugar > 180 when fasting because this means you need more adjusting of insulin      We recommend that you contact the clinic any time blood sugar is 85 or less.  This can help us to investigate why blood sugar dropped so low and help prevent this.       Contact the clinic any time that you have acute illness causing dehydration and if such illness does not improve significantly beyond 3 days.           We recommend insulin dosages as follows       Long Acting Insulin (Name LAntus). 40 Units given in the evening      Mealtime Insulin (Name Novolog) before every meal plus correction scale as follows:        Breakfast 8 units   Lunch 6 units   Dinner 10 units     LOW DOSE  < 70 mg/dL    TREAT LOW SUGAR AND CALL CLINIC  [phone]  70 - 130 mg/dL  0 units    131 - 180 mg/dL  2 units   181 -240 mg/dL  4 units    241 - 300 mg/dL  6 units    301- 350 mg/dL   8 units   351 - 400 mg/dL  10 units  > 400 mg/dL   Stat lab glucose and call physician with results.          Do not make further adjustments to insulin without contacting medical provider.        Bexarotene Counseling:  I discussed with the patient the risks of bexarotene including but not limited to hair loss, dry lips/skin/eyes, liver abnormalities, hyperlipidemia, pancreatitis, depression/suicidal ideation, photosensitivity, drug rash/allergic reactions, hypothyroidism, anemia, leukopenia, infection, cataracts, and teratogenicity.  Patient understands that they will need regular blood tests to check lipid profile, liver function tests, white blood cell count, thyroid function tests and pregnancy test if applicable.

## (undated) DEVICE — SEE MEDLINE ITEM 156955

## (undated) DEVICE — PAD PREP 50/CA

## (undated) DEVICE — STRIP WOUND PK BIGUANIDE 36X.5

## (undated) DEVICE — SPONGE LAP 18X18 PREWASHED

## (undated) DEVICE — DRAPE EXTREMITY FULL FABRIC

## (undated) DEVICE — PACK BASIC

## (undated) DEVICE — SWAB CULTURETTE SINGLE

## (undated) DEVICE — COVER OVERHEAD SURG LT BLUE

## (undated) DEVICE — SPONGE DERMACEA GAUZE 4X4

## (undated) DEVICE — PULSAVAC ZIMMER

## (undated) DEVICE — SEE MEDLINE ITEM 154981

## (undated) DEVICE — DRAPE LAP TIBURON 77X122IN

## (undated) DEVICE — SYR 10CC LUER LOCK

## (undated) DEVICE — SEE MEDLINE ITEM 157116

## (undated) DEVICE — SEE MEDLINE ITEM 157148

## (undated) DEVICE — SEE MEDLINE ITEM 152622

## (undated) DEVICE — SEE MEDLINE ITEM 157117

## (undated) DEVICE — SOL IRR NACL .9% 3000ML

## (undated) DEVICE — KIT COLLECTION E SWAB REGULAR

## (undated) DEVICE — ELECTRODE REM PLYHSV RETURN 9

## (undated) DEVICE — PAD ABDOMINAL 5X9 STERILE

## (undated) DEVICE — DRAIN PENRS STERILE LTX 18X1/2

## (undated) DEVICE — GAUZE SPONGE 4X4 12PLY

## (undated) DEVICE — NDL HYPO REG 25G X 1 1/2

## (undated) DEVICE — GLOVE PROTEXIS HYDROGEL SZ7

## (undated) DEVICE — SUT 2/0 30IN SILK BLK BRAI